# Patient Record
Sex: FEMALE | Race: WHITE | NOT HISPANIC OR LATINO | Employment: OTHER | URBAN - METROPOLITAN AREA
[De-identification: names, ages, dates, MRNs, and addresses within clinical notes are randomized per-mention and may not be internally consistent; named-entity substitution may affect disease eponyms.]

---

## 2017-10-17 ENCOUNTER — TRANSFERRED RECORDS (OUTPATIENT)
Dept: HEALTH INFORMATION MANAGEMENT | Facility: CLINIC | Age: 37
End: 2017-10-17

## 2017-11-06 ENCOUNTER — TRANSFERRED RECORDS (OUTPATIENT)
Dept: HEALTH INFORMATION MANAGEMENT | Facility: CLINIC | Age: 37
End: 2017-11-06

## 2018-07-16 ENCOUNTER — OFFICE VISIT (OUTPATIENT)
Dept: DERMATOLOGY | Facility: CLINIC | Age: 38
End: 2018-07-16
Attending: DERMATOLOGY
Payer: COMMERCIAL

## 2018-07-16 VITALS
BODY MASS INDEX: 39.2 KG/M2 | HEART RATE: 74 BPM | HEIGHT: 62 IN | SYSTOLIC BLOOD PRESSURE: 147 MMHG | DIASTOLIC BLOOD PRESSURE: 83 MMHG | WEIGHT: 213 LBS

## 2018-07-16 DIAGNOSIS — L29.9 PRURITUS: ICD-10-CM

## 2018-07-16 DIAGNOSIS — L24.9 IRRITANT CONTACT DERMATITIS, UNSPECIFIED TRIGGER: Primary | ICD-10-CM

## 2018-07-16 PROCEDURE — G0463 HOSPITAL OUTPT CLINIC VISIT: HCPCS | Mod: ZF

## 2018-07-16 RX ORDER — SACCHAROMYCES BOULARDII 250 MG
250 CAPSULE ORAL 2 TIMES DAILY
COMMUNITY
End: 2019-04-23

## 2018-07-16 RX ORDER — FLUOCINONIDE 0.5 MG/G
OINTMENT TOPICAL
Qty: 240 G | Refills: 1 | Status: SHIPPED | OUTPATIENT
Start: 2018-07-16 | End: 2019-11-25

## 2018-07-16 ASSESSMENT — PAIN SCALES - GENERAL: PAINLEVEL: NO PAIN (0)

## 2018-07-16 NOTE — LETTER
7/16/2018      RE: Pao Pereira  [address]  [address]  [facility] [address]       Dermatology Clinic Note    Dermatology Problem List:  1. Hand dermatitis  2. 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 Past history of lip dermatitis  4. Past history of sensitivity to metal jewelry       CC: Patient presents with:  Establish Care: Skin check , C/O eczema      HPI: Pao Pereira is a 37 year old female presenting for initial evaluation of rash on the hands, elbows, knees. Rash on the hands started in 12/17. She developed irritation on the ring finger and it spread to cover both hands. Rash was treated by another dermatologist with topical clobetasol and oral prednisone. The rash continues to recur. Ms. Pereira has been intermittently been using triple antibiotic, Aveeno eczema lotion and Eucerin lotion. Washes dishes without gloves. Uses Softsoap for hand washing. Using baby wipes on her children. Children play with slime. She uses bleach wipes to clean her home. No use of hand .    Ms. Pereira notes past history of lip irritation from using lip products like Chapstick, and a history of rash when wearing certain jewelry and earrings. No history of atopic dermatitis as a child.     Rash on the elbows and knees started 3 months ago and is mildly pruritic, but not to the degree of the hand rash.        Patient Active Problem List   Diagnosis     Irritant contact dermatitis, unspecified trigger       Allergies not on file      Current Outpatient Prescriptions   Medication     fluocinonide (LIDEX) 0.05 % ointment     saccharomyces boulardii (FLORASTOR) 250 MG capsule     No current facility-administered medications for this visit.        No family history on file.    Social History     Social History     Marital status:      Spouse name: N/A     Number of children: N/A     Years of education: N/A     Occupational History     Not on file.     Social History Main Topics     Smoking status: Never  "Smoker     Smokeless tobacco: Never Used     Alcohol use 0.6 oz/week     1 Glasses of wine per week     Drug use: No     Sexual activity: Yes     Partners: Male     Birth control/ protection: IUD     Other Topics Concern     Not on file     Social History Narrative     No narrative on file         ROS: Feeling well without other skin concerns.     EXAM:  /83  Pulse 74  Ht 5' 2\" (157.5 cm)  Wt 213 lb (96.6 kg)  LMP 07/14/2018  Breastfeeding? No  BMI 38.96 kg/m2  GEN: Alert, no distress  HEENT: Conjunctiva clear.   PULM: Breathing comfortably on RA  CV: Extrem warm and well perfused  ABD: No distension  SKIN: Exam of the scalp, hands, arms, legs:  --Pink ill defined scaling plaques with linear fissuring on the palms and distal fingers with yellow crusting in the fissures. There is edema to the distal fingertips and periungual fingers with horizontal ridging of the most of the fingernail plates. Erythema and scale extends into the web spaces of most fingers of the bilateral hands and there are erythematous ill defined plaques on the bilateral volar wrists  --Circular well demarcated pink plaques on the bilateral elbows and knees        Assessment and Plan:    1. Hand dermatitis: The exam and history is most suspicious for contact dermatitis- allergic vs irritant. Exposures include over the counter antibiotic ointment, dish soap, soft soap, slime, baby wipes and bleach wipes. There are fissures and signs of impetigo today. I recommended the following plan   -Dilute bleach soaks to hands daily  -Lidex ointment BID  -Vaseline BID  -Avoid contactants  -Referral to patch clinic at [facility].       2. Psoriasiform eruption on the elbows and knees: Well demarcated plaques in this location would be most suggestive of psoriasis. The hand eruption may represent a manifestation of palmar psoriasis, but the severe pruritus as well as the classic dermatitic appearance of lesions of the wrist would indicate " dermatitis. I suggested bid lidex to these areas. Will biopsy at next visit if persistent.     RTC 1 month.       Brittney Valentin MD  Dermatology Staff                  Brittney Valentin MD

## 2018-07-16 NOTE — LETTER
Date:July 20, 2018      Patient was self referred, no letter generated. Do not send.        [facility] Information

## 2018-07-16 NOTE — PROGRESS NOTES
Dermatology Clinic Note    Dermatology Problem List:  1. Hand dermatitis  2. 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 Past history of lip dermatitis  4. Past history of sensitivity to metal jewelry       CC: Patient presents with:  Establish Care: Skin check , C/O eczema      HPI: Pao Pereira is a 37 year old female presenting for initial evaluation of rash on the hands, elbows, knees. Rash on the hands started in 12/17. She developed irritation on the ring finger and it spread to cover both hands. Rash was treated by another dermatologist with topical clobetasol and oral prednisone. The rash continues to recur. Ms. Pereira has been intermittently been using triple antibiotic, Aveeno eczema lotion and Eucerin lotion. Washes dishes without gloves. Uses Softsoap for hand washing. Using baby wipes on her children. Children play with slime. She uses bleach wipes to clean her home. No use of hand .    Ms. Pereira notes past history of lip irritation from using lip products like Chapstick, and a history of rash when wearing certain jewelry and earrings. No history of atopic dermatitis as a child.     Rash on the elbows and knees started 3 months ago and is mildly pruritic, but not to the degree of the hand rash.        Patient Active Problem List   Diagnosis     Irritant contact dermatitis, unspecified trigger       Allergies not on file      Current Outpatient Prescriptions   Medication     fluocinonide (LIDEX) 0.05 % ointment     saccharomyces boulardii (FLORASTOR) 250 MG capsule     No current facility-administered medications for this visit.        No family history on file.    Social History     Social History     Marital status:      Spouse name: N/A     Number of children: N/A     Years of education: N/A     Occupational History     Not on file.     Social History Main Topics     Smoking status: Never Smoker     Smokeless tobacco: Never Used     Alcohol use 0.6 oz/week     1 Glasses of wine  "per week     Drug use: No     Sexual activity: Yes     Partners: Male     Birth control/ protection: IUD     Other Topics Concern     Not on file     Social History Narrative     No narrative on file         ROS: Feeling well without other skin concerns.     EXAM:  /83  Pulse 74  Ht 5' 2\" (157.5 cm)  Wt 213 lb (96.6 kg)  LMP 07/14/2018  Breastfeeding? No  BMI 38.96 kg/m2  GEN: Alert, no distress  HEENT: Conjunctiva clear.   PULM: Breathing comfortably on RA  CV: Extrem warm and well perfused  ABD: No distension  SKIN: Exam of the scalp, hands, arms, legs:  --Pink ill defined scaling plaques with linear fissuring on the palms and distal fingers with yellow crusting in the fissures. There is edema to the distal fingertips and periungual fingers with horizontal ridging of the most of the fingernail plates. Erythema and scale extends into the web spaces of most fingers of the bilateral hands and there are erythematous ill defined plaques on the bilateral volar wrists  --Circular well demarcated pink plaques on the bilateral elbows and knees        Assessment and Plan:    1. Hand dermatitis: The exam and history is most suspicious for contact dermatitis- allergic vs irritant. Exposures include over the counter antibiotic ointment, dish soap, soft soap, slime, baby wipes and bleach wipes. There are fissures and signs of impetigo today. I recommended the following plan   -Dilute bleach soaks to hands daily  -Lidex ointment BID  -Vaseline BID  -Avoid contactants  -Referral to patch clinic at [facility].       2. Psoriasiform eruption on the elbows and knees: Well demarcated plaques in this location would be most suggestive of psoriasis. The hand eruption may represent a manifestation of palmar psoriasis, but the severe pruritus as well as the classic dermatitic appearance of lesions of the wrist would indicate dermatitis. I suggested bid lidex to these areas. Will biopsy at next visit if persistent.     RTC 1 " month.       Brittney Valentin MD  Dermatology Staff

## 2018-07-16 NOTE — PATIENT INSTRUCTIONS
"-Soaks hands daily with 1/2 gallon warm water with 1 tablespoon plain bleach. Soak 5 min. After soaking apply lidex ointment, Vaseline. OK to wear plain cotton gloves to bed  -Repeat lidex a 2nd time daily  -Avoid contact with dish detergent  -Look for wet wipes that do not contain fragrance or MCI/MI= methylisothiazolinone or methylchloroisothiazolinone. Look for \"water wipes\"  -I will place referral to [facility] patch clinic in [place]. If this location is not covered we can do the testing at [facility]  -Avoid triple antibiotic ointment    Hand Dermatitis:  The hands are exposed to more irritants than other body parts, which makes them a common place for dry skin and rashes.  Frequent wetting of the hands and washing can make this worse.      Try these strategies:  1. Make sure that all of your products are hypoallergenic/fragrance free (see the gentle skin care instructions)  2. Moisturize the hands frequently, especially after handwashing.  Consider sending moisturizer with your child to school.  3. Choose very thick products for overnight. Vaseline and other similar greasy ointments are best.  4. Consider covering the hands with white cotton gloves for a few hours in the evening or even overnight while sleeping  5. If your doctor has given a prescription medication for the hand rash, apply this first, then apply a thick coating of moisturizer  6. Minimize handwashing when possible (but always hand wash after using the restroom and before meals)  7. Remove harsh soaps from bathrooms, sanket, and other places your child watches his/her hands.    -Most  pump  hand soaps (including the brand Softsoap but not limited too) contain detergents that strip the natural oils from the skin. An example of this is; dish detergent which makes a lot of suds which is used to strip the grease from dishes. These detergents do the same thing to the oils on the hands.    -Replace harsh or high-sudsing soaps with a gentle liquid " cleanser or mild bar soap.   -Organic or homemade soaps may also worsen hand dermatitis if they contain plant materials or fragrance.   8. Avoid using pre-moistened or baby wipes on the hands. These contain preservatives and ingredients that can cause skin irritation or allergy.  9. Ask if your child is using bleach or cleaning wipes at school to clean his/her desk.  -These are very harsh on the skin and can worsen dermatitis.   -Residue left behind from the wipes may stay on surfaces that your child touches and continue to irritate the skin.   10. Considering sending a gentle cleanser to school to use for handwashing (most schools will require a doctor's note, we would be happy to provide this)

## 2018-07-16 NOTE — NURSING NOTE
Chief Complaint   Patient presents with     Establish Care     Skin check , C/O eczema   Ivana Elliott LPN

## 2018-07-16 NOTE — MR AVS SNAPSHOT
"              After Visit Summary   7/16/2018    Pao Pereira    MRN: [id number]           Patient Information     Date Of Birth          1980        Visit Information        Provider Department      7/16/2018 9:15 AM Brittney Valentin MD [facility]        Today's Diagnoses     Irritant contact dermatitis, unspecified trigger    -  1      Care Instructions    -Soaks hands daily with 1/2 gallon warm water with 1 tablespoon plain bleach. Soak 5 min. After soaking apply lidex ointment, Vaseline. OK to wear plain cotton gloves to bed  -Repeat lidex a 2nd time daily  -Avoid contact with dish detergent  -Look for wet wipes that do not contain fragrance or MCI/MI= methylisothiazolinone or methylchloroisothiazolinone. Look for \"water wipes\"  -I will place referral to [facility] patch clinic in [place]. If this location is not covered we can do the testing at [facility]  -Avoid triple antibiotic ointment    Hand Dermatitis:  The hands are exposed to more irritants than other body parts, which makes them a common place for dry skin and rashes.  Frequent wetting of the hands and washing can make this worse.      Try these strategies:  1. Make sure that all of your products are hypoallergenic/fragrance free (see the gentle skin care instructions)  2. Moisturize the hands frequently, especially after handwashing.  Consider sending moisturizer with your child to school.  3. Choose very thick products for overnight. Vaseline and other similar greasy ointments are best.  4. Consider covering the hands with white cotton gloves for a few hours in the evening or even overnight while sleeping  5. If your doctor has given a prescription medication for the hand rash, apply this first, then apply a thick coating of moisturizer  6. Minimize handwashing when possible (but always hand wash after using the restroom and before meals)  7. Remove harsh soaps from bathrooms, sanket, and other places your child " watches his/her hands.    -Most  pump  hand soaps (including the brand Softsoap but not limited too) contain detergents that strip the natural oils from the skin. An example of this is; dish detergent which makes a lot of suds which is used to strip the grease from dishes. These detergents do the same thing to the oils on the hands.    -Replace harsh or high-sudsing soaps with a gentle liquid cleanser or mild bar soap.   -Organic or homemade soaps may also worsen hand dermatitis if they contain plant materials or fragrance.   8. Avoid using pre-moistened or baby wipes on the hands. These contain preservatives and ingredients that can cause skin irritation or allergy.  9. Ask if your child is using bleach or cleaning wipes at school to clean his/her desk.  -These are very harsh on the skin and can worsen dermatitis.   -Residue left behind from the wipes may stay on surfaces that your child touches and continue to irritate the skin.   10. Considering sending a gentle cleanser to school to use for handwashing (most schools will require a doctor's note, we would be happy to provide this)            Follow-ups after your visit        Who to contact     Please call your clinic at [phone] to:    Ask questions about your health    Make or cancel appointments    Discuss your medicines    Learn about your test results    Speak to your doctor            Additional Information About Your Visit        Foound Information     Foound gives you secure access to your electronic health record. If you see a primary care provider, you can also send messages to your care team and make appointments. If you have questions, please call your primary care clinic.  If you do not have a primary care provider, please call [phone] and they will assist you.      Fo[employer] is an electronic gateway that provides easy, online access to your medical records. With [employer], you can request a clinic appointment, read your test results, renew a  "prescription or communicate with your care team.     To access your existing account, please contact your [facility] or call [phone] for assistance.        Care EveryWhere ID     This is your Care EveryWhere ID. This could be used by other organizations to access your [place] medical records  [id number]        Your Vitals Were     Pulse Height Last Period Breastfeeding? BMI (Body Mass Index)       74 5' 2\" (157.5 cm) 07/14/2018 No 38.96 kg/m2        Blood Pressure from Last 3 Encounters:   07/16/18 147/83    Weight from Last 3 Encounters:   07/16/18 213 lb (96.6 kg)              Today, you had the following     No orders found for display         Today's Medication Changes          These changes are accurate as of 7/16/18  9:49 AM.  If you have any questions, ask your nurse or doctor.               Start taking these medicines.        Dose/Directions    fluocinonide 0.05 % ointment   Commonly known as:  LIDEX   Used for:  Irritant contact dermatitis, unspecified trigger   Started by:  Brittney Valentin MD        Twice daily to rash on the hands, elbows, knees for 4 weeks   Quantity:  240 g   Refills:  1            Where to get your medicines      Some of these will need a paper prescription and others can be bought over the counter.  Ask your nurse if you have questions.     Bring a paper prescription for each of these medications     fluocinonide 0.05 % ointment                Primary Care Provider    None Specified       No primary provider on file.        Equal Access to Services     RIANNA DON AH: Adeola Mccracken, wacathie smith, qaybta caitiealannika rocha. So [facility] [phone].    ATENCIÓN: Si habla español, tiene a parikh disposición servicios gratuitos de asistencia lingüística. Llame al [phone].    We comply with applicable federal civil rights laws and [place] laws. We do not discriminate on the basis of " race, color, national origin, age, disability, sex, sexual orientation, or gender identity.            Thank you!     Thank you for choosing [facility]  for your care. Our goal is always to provide you with excellent care. Hearing back from our patients is one way we can continue to improve our services. Please take a few minutes to complete the written survey that you may receive in the mail after your visit with us. Thank you!             Your Updated Medication List - Protect others around you: Learn how to safely use, store and throw away your medicines at www.disposemymeds.org.          This list is accurate as of 7/16/18  9:49 AM.  Always use your most recent med list.                   Brand Name Dispense Instructions for use Diagnosis    fluocinonide 0.05 % ointment    LIDEX    240 g    Twice daily to rash on the hands, elbows, knees for 4 weeks    Irritant contact dermatitis, unspecified trigger       saccharomyces boulardii 250 MG capsule    FLORASTOR     Take 250 mg by mouth 2 times daily

## 2018-07-19 PROBLEM — L29.9 PRURITUS: Status: ACTIVE | Noted: 2018-07-19

## 2018-07-25 ENCOUNTER — HEALTH MAINTENANCE LETTER (OUTPATIENT)
Age: 38
End: 2018-07-25

## 2018-08-31 ENCOUNTER — OFFICE VISIT (OUTPATIENT)
Dept: DERMATOLOGY | Facility: CLINIC | Age: 38
End: 2018-08-31
Payer: COMMERCIAL

## 2018-08-31 DIAGNOSIS — L24.9 IRRITANT CONTACT DERMATITIS, UNSPECIFIED TRIGGER: Primary | ICD-10-CM

## 2018-08-31 DIAGNOSIS — L29.9 PRURITUS: ICD-10-CM

## 2018-08-31 ASSESSMENT — PAIN SCALES - GENERAL: PAINLEVEL: NO PAIN (0)

## 2018-08-31 NOTE — LETTER
8/31/2018       RE: Pao Pereira  [address]  [address]  [facility] [address]     Dear Colleague,    Thank you for referring your patient, Pao Pereira, to the [facility] DERMATOLOGY at [facility]. Please see a copy of my visit note below.    Dermatology Clinic Note     Dermatology Problem List:  1. Hand dermatitis  2. 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 Past history of lip dermatitis  4. Past history of sensitivity to metal jewelry     Dermatology Clinic Visit Note      CHIEF COMPLAINT:  Followup hand dermatitis.      HISTORY OF PRESENT ILLNESS:  Ms. Pereira is a 37-year-old female returning to Dermatology Clinic for evaluation of dermatitis on the hands and elbows, last seen in clinic on 07/16/2018.  Please see note from that time for list of contactants.  The patient was advised to avoid all exposures including over-the-counter antibiotic ointment, dish soap, soft soaps, slime, baby wipes and bleach wipes.  I recommended dilute bleach soaks to the hands daily, Lidex ointment twice daily, Vaseline twice daily and referred to [facility].  Patient states that she has been using medications as prescribed.  She notes resolution of hand dermatitis but continues to develop new lesions intermittently.  Eruption on the elbows has been much improved.  She has had no past biopsy of her skin.      Patient Active Problem List   Diagnosis     Irritant contact dermatitis, unspecified trigger     Pruritus       Not on File      Current Outpatient Prescriptions   Medication     fluocinonide (LIDEX) 0.05 % ointment     saccharomyces boulardii (FLORASTOR) 250 MG capsule     No current facility-administered medications for this visit.           SOCIAL HISTORY:  The patient is .  She is a nonsmoker.      REVIEW OF SYSTEMS:  Feels well without other skin concerns.      PHYSICAL EXAMINATION:   GENERAL:  The patient is a healthy-appearing 37-year-old female in no distress.    HEENT:  Conjunctivae are clear.   PULMONARY:  Breathing comfortably on room air.   SKIN:  Exam was focused on the hands and arms.  Skin exam was normal except for as follows:     -- Near resolution of plaques on the palms.  There is mild linear fissuring of the distal fingers.  Elbows show slightly thickened pink plaques that are ill defined.      ASSESSMENT AND PLAN:   1.  History of hand dermatitis.  Differential diagnosis would include psoriasis of the hands.  She is much improved with use of gentle skin cares, dilute bleach soaks and Lidex ointment twice daily as needed.  She notes when she discontinues this regimen lesions begin to recur.  I will contact [facility] for scheduling for testing.  The patient states that she has been unable to schedule.      The patient to return to Dermatology Clinic after she completes her allergy testing.     Brittney Valentin MD  Dermatology Staff          Again, thank you for allowing me to participate in the care of your patient.      Sincerely,    Brittney Valentin MD

## 2018-08-31 NOTE — MR AVS SNAPSHOT
After Visit Summary   8/31/2018    Pao Pereira    MRN: [id number]           Patient Information     Date Of Birth          1980        Visit Information        Provider Department      8/31/2018 3:30 PM Brittney Valentin MD [facility] Dermatology        Today's Diagnoses     Irritant contact dermatitis, unspecified trigger    -  1    Pruritus           Follow-ups after your visit        Who to contact     Please call your clinic at [phone] to:    Ask questions about your health    Make or cancel appointments    Discuss your medicines    Learn about your test results    Speak to your doctor            Additional Information About Your Visit        MyChart Information     [employer] gives you secure access to your electronic health record. If you see a primary care provider, you can also send messages to your care team and make appointments. If you have questions, please call your primary care clinic.  If you do not have a primary care provider, please call [phone] and they will assist you.      [employer] is an electronic gateway that provides easy, online access to your medical records. With [employer], you can request a clinic appointment, read your test results, renew a prescription or communicate with your care team.     To access your existing account, please contact your [facility] or call [phone] for assistance.        Care EveryWhere ID     This is your Care EveryWhere ID. This could be used by other organizations to access your [place] medical records  [id number]         Blood Pressure from Last 3 Encounters:   07/16/18 147/83    Weight from Last 3 Encounters:   07/16/18 96.6 kg (213 lb)              Today, you had the following     No orders found for display       Primary Care Provider    None Specified       No primary provider on file.        Equal Access to Services     RIANNA DON : naomi Damian qaybta  annika hodgejarad seals ah. So [facility] [phone].    ATENCIÓN: Si damaso madden, tiene a parikh disposición servicios gratuitos de asistencia lingüística. Chuck al [phone].    We comply with applicable federal civil rights laws and [place] laws. We do not discriminate on the basis of race, color, national origin, age, disability, sex, sexual orientation, or gender identity.            Thank you!     Thank you for choosing [facility] DERMATOLOGY  for your care. Our goal is always to provide you with excellent care. Hearing back from our patients is one way we can continue to improve our services. Please take a few minutes to complete the written survey that you may receive in the mail after your visit with us. Thank you!             Your Updated Medication List - Protect others around you: Learn how to safely use, store and throw away your medicines at www.disposemymeds.org.          This list is accurate as of 8/31/18 11:59 PM.  Always use your most recent med list.                   Brand Name Dispense Instructions for use Diagnosis    fluocinonide 0.05 % ointment    LIDEX    240 g    Twice daily to rash on the hands, elbows, knees for 4 weeks    Irritant contact dermatitis, unspecified trigger       saccharomyces boulardii 250 MG capsule    FLORASTOR     Take 250 mg by mouth 2 times daily

## 2018-08-31 NOTE — PROGRESS NOTES
Dermatology Clinic Note     Dermatology Problem List:  1. Hand dermatitis  2. 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 Past history of lip dermatitis  4. Past history of sensitivity to metal jewelry     Dermatology Clinic Visit Note      CHIEF COMPLAINT:  Followup hand dermatitis.      HISTORY OF PRESENT ILLNESS:  Ms. Pereira is a 37-year-old female returning to Dermatology Clinic for evaluation of dermatitis on the hands and elbows, last seen in clinic on 07/16/2018.  Please see note from that time for list of contactants.  The patient was advised to avoid all exposures including over-the-counter antibiotic ointment, dish soap, soft soaps, slime, baby wipes and bleach wipes.  I recommended dilute bleach soaks to the hands daily, Lidex ointment twice daily, Vaseline twice daily and referred to [facility].  Patient states that she has been using medications as prescribed.  She notes resolution of hand dermatitis but continues to develop new lesions intermittently.  Eruption on the elbows has been much improved.  She has had no past biopsy of her skin.      Patient Active Problem List   Diagnosis     Irritant contact dermatitis, unspecified trigger     Pruritus       Not on File      Current Outpatient Prescriptions   Medication     fluocinonide (LIDEX) 0.05 % ointment     saccharomyces boulardii (FLORASTOR) 250 MG capsule     No current facility-administered medications for this visit.           SOCIAL HISTORY:  The patient is .  She is a nonsmoker.      REVIEW OF SYSTEMS:  Feels well without other skin concerns.      PHYSICAL EXAMINATION:   GENERAL:  The patient is a healthy-appearing 37-year-old female in no distress.   HEENT:  Conjunctivae are clear.   PULMONARY:  Breathing comfortably on room air.   SKIN:  Exam was focused on the hands and arms.  Skin exam was normal except for as follows:     -- Near resolution of plaques on the palms.  There is mild linear fissuring of the distal fingers.  Elbows  show slightly thickened pink plaques that are ill defined.      ASSESSMENT AND PLAN:   1.  History of hand dermatitis.  Differential diagnosis would include psoriasis of the hands.  She is much improved with use of gentle skin cares, dilute bleach soaks and Lidex ointment twice daily as needed.  She notes when she discontinues this regimen lesions begin to recur.  I will contact [facility] for scheduling for testing.  The patient states that she has been unable to schedule.      The patient to return to Dermatology Clinic after she completes her allergy testing.     Brittney Valentin MD  Dermatology Staff

## 2018-08-31 NOTE — NURSING NOTE
Chief Complaint   Patient presents with     RECHECK     Patient is seen today for a follow up of Eczema on hands, elbows, and knees patient states it has improved since last time.      Tina Olmos CMA

## 2018-08-31 NOTE — LETTER
Date:September 11, 2018      Patient was self referred, no letter generated. Do not send.        [facility] Information

## 2018-10-17 ENCOUNTER — ALLIED HEALTH/NURSE VISIT (OUTPATIENT)
Dept: NURSING | Facility: CLINIC | Age: 38
End: 2018-10-17
Payer: COMMERCIAL

## 2018-10-17 DIAGNOSIS — Z23 NEED FOR PROPHYLACTIC VACCINATION AND INOCULATION AGAINST INFLUENZA: Primary | ICD-10-CM

## 2018-10-17 PROCEDURE — 90686 IIV4 VACC NO PRSV 0.5 ML IM: CPT

## 2018-10-17 PROCEDURE — 99207 ZZC NO CHARGE NURSE ONLY: CPT

## 2018-10-17 PROCEDURE — 90471 IMMUNIZATION ADMIN: CPT

## 2018-10-17 NOTE — MR AVS SNAPSHOT
After Visit Summary   10/17/2018    Pao Pereira    MRN: [id number]           Patient Information     Date Of Birth          1980        Visit Information        Provider Department      10/17/2018 5:10 PM CARE COORDINATOR [facility]        Today's Diagnoses     Need for prophylactic vaccination and inoculation against influenza    -  1       Follow-ups after your visit        Who to contact     If you have questions or need follow up information about today's clinic visit or your schedule please contact [facility] directly at [phone].  Normal or non-critical lab and imaging results will be communicated to you by Onith[facility], letter or phone within 4 business days after the clinic has received the results. If you do not hear from us within 7 days, please contact the clinic through [facility] or phone. If you have a critical or abnormal lab result, we will notify you by phone as soon as possible.  Submit refill requests through [facility] or call your pharmacy and they will forward the refill request to us. Please allow 3 business days for your refill to be completed.          Additional Information About Your Visit        MyChart Information     [facility] gives you secure access to your electronic health record. If you see a primary care provider, you can also send messages to your care team and make appointments. If you have questions, please call your primary care clinic.  If you do not have a primary care provider, please call [phone] and they will assist you.        Care EveryWhere ID     This is your Care EveryWhere ID. This could be used by other organizations to access your [place] medical records  [id number]         Blood Pressure from Last 3 Encounters:   07/16/18 147/83    Weight from Last 3 Encounters:   07/16/18 213 lb (96.6 kg)              We Performed the Following     FLU VACCINE, SPLIT VIRUS, IM (QUADRIVALENT) [52546]-  >3 YRS     Vaccine Administration, Initial [75009]        Primary Care Provider    None Specified       No primary provider on file.        Equal Access to Services     RIANNA DON : Hadii aad ku hadtinyiesha Mccracken, thompsonda kaitlindavionha, nabila caitiesanjuwes harorimawes, annika simeon adrienneumer munguiahalimajose carlos frankel. So [facility] [phone].    ATENCIÓN: Si habla español, tiene a parikh disposición servicios gratuitos de asistencia lingüística. Llame al [phone].    We comply with applicable federal civil rights laws and [place] laws. We do not discriminate on the basis of race, color, national origin, age, disability, sex, sexual orientation, or gender identity.            Thank you!     Thank you for choosing [facility]  for your care. Our goal is always to provide you with excellent care. Hearing back from our patients is one way we can continue to improve our services. Please take a few minutes to complete the written survey that you may receive in the mail after your visit with us. Thank you!             Your Updated Medication List - Protect others around you: Learn how to safely use, store and throw away your medicines at www.disposemymeds.org.          This list is accurate as of 10/17/18  5:51 PM.  Always use your most recent med list.                   Brand Name Dispense Instructions for use Diagnosis    fluocinonide 0.05 % ointment    LIDEX    240 g    Twice daily to rash on the hands, elbows, knees for 4 weeks    Irritant contact dermatitis, unspecified trigger       saccharomyces boulardii 250 MG capsule    FLORASTOR     Take 250 mg by mouth 2 times daily

## 2018-10-17 NOTE — PROGRESS NOTES

## 2019-04-22 ASSESSMENT — ENCOUNTER SYMPTOMS
NAUSEA: 1
POOR WOUND HEALING: 1
DIARRHEA: 1

## 2019-04-22 ASSESSMENT — ANXIETY QUESTIONNAIRES
3. WORRYING TOO MUCH ABOUT DIFFERENT THINGS: NOT AT ALL
7. FEELING AFRAID AS IF SOMETHING AWFUL MIGHT HAPPEN: NOT AT ALL
7. FEELING AFRAID AS IF SOMETHING AWFUL MIGHT HAPPEN: NOT AT ALL
1. FEELING NERVOUS, ANXIOUS, OR ON EDGE: NOT AT ALL
5. BEING SO RESTLESS THAT IT IS HARD TO SIT STILL: NOT AT ALL
GAD7 TOTAL SCORE: 0
2. NOT BEING ABLE TO STOP OR CONTROL WORRYING: NOT AT ALL
4. TROUBLE RELAXING: NOT AT ALL
6. BECOMING EASILY ANNOYED OR IRRITABLE: NOT AT ALL
GAD7 TOTAL SCORE: 0

## 2019-04-23 ENCOUNTER — OFFICE VISIT (OUTPATIENT)
Dept: OBGYN | Facility: CLINIC | Age: 39
End: 2019-04-23
Attending: OBSTETRICS & GYNECOLOGY
Payer: COMMERCIAL

## 2019-04-23 VITALS
HEART RATE: 66 BPM | WEIGHT: 203.4 LBS | SYSTOLIC BLOOD PRESSURE: 124 MMHG | BODY MASS INDEX: 37.43 KG/M2 | HEIGHT: 62 IN | DIASTOLIC BLOOD PRESSURE: 81 MMHG

## 2019-04-23 DIAGNOSIS — Z30.432 ENCOUNTER FOR IUD REMOVAL: ICD-10-CM

## 2019-04-23 DIAGNOSIS — Z13.1 SCREENING FOR DIABETES MELLITUS: ICD-10-CM

## 2019-04-23 DIAGNOSIS — Z13.29 SCREENING FOR THYROID DISORDER: ICD-10-CM

## 2019-04-23 DIAGNOSIS — Z13.220 SCREENING FOR LIPOID DISORDERS: ICD-10-CM

## 2019-04-23 DIAGNOSIS — Z01.419 ENCOUNTER FOR GYNECOLOGICAL EXAMINATION WITHOUT ABNORMAL FINDING: ICD-10-CM

## 2019-04-23 DIAGNOSIS — Z00.00 VISIT FOR PREVENTIVE HEALTH EXAMINATION: Primary | ICD-10-CM

## 2019-04-23 DIAGNOSIS — Z12.4 SCREENING FOR MALIGNANT NEOPLASM OF CERVIX: ICD-10-CM

## 2019-04-23 LAB
CHOLEST SERPL-MCNC: 165 MG/DL
HBA1C MFR BLD: 4.9 % (ref 0–5.6)
HDLC SERPL-MCNC: 60 MG/DL
LDLC SERPL CALC-MCNC: 89 MG/DL
NONHDLC SERPL-MCNC: 105 MG/DL
TRIGL SERPL-MCNC: 80 MG/DL
TSH SERPL DL<=0.005 MIU/L-ACNC: 3.04 MU/L (ref 0.4–4)

## 2019-04-23 PROCEDURE — G0463 HOSPITAL OUTPT CLINIC VISIT: HCPCS | Mod: ZF

## 2019-04-23 PROCEDURE — 84443 ASSAY THYROID STIM HORMONE: CPT

## 2019-04-23 PROCEDURE — 83036 HEMOGLOBIN GLYCOSYLATED A1C: CPT

## 2019-04-23 PROCEDURE — 87624 HPV HI-RISK TYP POOLED RSLT: CPT

## 2019-04-23 PROCEDURE — G0145 SCR C/V CYTO,THINLAYER,RESCR: HCPCS

## 2019-04-23 PROCEDURE — 80061 LIPID PANEL: CPT

## 2019-04-23 PROCEDURE — 36415 COLL VENOUS BLD VENIPUNCTURE: CPT

## 2019-04-23 ASSESSMENT — ANXIETY QUESTIONNAIRES: GAD7 TOTAL SCORE: 0

## 2019-04-23 ASSESSMENT — MIFFLIN-ST. JEOR: SCORE: 1555.87

## 2019-04-23 NOTE — LETTER
Date:April 29, 2019      Patient was self referred, no letter generated. Do not send.        [facility] Health Information

## 2019-04-23 NOTE — PROGRESS NOTES
"[facility]  Annual Exam    HPI:    Pao Pereira is a 38 year old  (, ), here for well woman exam and discussion of endometriosis. She has lifelong history of abdominal pain, and heavy periods with clotting. In 10/2017 she noted worsening pain and had a pelvic US with endometriomas/ finidings consistent with frozen cul de sac. A month later had the Mirena put in. Her pain is unchanged with this. She is still having monthly menses with Mirena, Q25 days. The bleeding is lighter but lasting for 9 days. No abnormal vaginal discharge, no urinary symptoms. No bowel concerns but mentions ongoing diarrhea, this is not new for her. She is desiring another pregnancy, and would like her IUD removed today.    GYN History  - LMP: Patient's last menstrual period was 2019.  - Menses: Montly Q28 days, light bleeding for 9 days  - Pap Smears: Possible history of abnormal per patient, records unavailable  - Contraception: Mirena IUD put in 2017  - Sexual Activity: Male partners, occasional pain with intercourse  - Sexual Concerns: Occasional pain with intercourse  - Hx STIs: No    OBHx  OB History    Para Term  AB Living   2 2 2 0 0 2   SAB TAB Ectopic Multiple Live Births   0 0 0 0 2      # Outcome Date GA Lbr Amilcar/2nd Weight Sex Delivery Anes PTL Lv   2 Term            1 Term              Past Medical History  - None  - Migraines without aura    Past Surgical History  - None    Medications  - Mirena    Allergies  - None    Social History  - Moved here from [place], for 's job.     Family History  - Maternal Aunt- Breast Cancer    ROS: 10-Point ROS negative except as noted in HPI    Preventative Health  Current or historical sexual, physical or mental abuse: No  Feelings of depression: None  Diet: Trying to eat healthier  Exercise: 3 x a week at [facility]    Physical Exam  /81 (BP Location: Left arm, Patient Position: Chair)   Pulse 66   Ht 1.575 m (5' 2\")   Wt 92.3 kg (203 lb " 6.4 oz)   LMP 2019   Breastfeeding? No   BMI 37.20 kg/m    Gen: Well-appearing, NAD  HEENT: Normocephalic, atraumatic  Neck: Thyroid is not enlarged, no appreciable masses palpated. Non-tender  CV:  RRR, no m/r/g auscultated  Pulm: CTAB, no w/r/r auscultated  Abd: Soft, non-tender, non-distended  Ext: No LE edema, extremities warm and well perfused    Breast: Symmetric, no nipple discharge or retraction, no axillary lymphadenopathy, no palpable nodules or masses bilaterally    Pelvic:  Normal appearing external female genitalia. Normal hair distribution. Vagina is without lesions.  discharge. Cervix without lesions, no cervical motion tenderness. Uterus difficult to palpate with patients body habitus. No adnexal tenderness or masses palpable.    Current BMI: Body mass index is 37.2 kg/m . (Obese)    IUD Removal Procedure:  Patient placed in lithotomy position. A medium Graves speculum was placed in the vagina. IUD strings visualized easily. Strings grasped with a ringed forceps. IUD was removed with gentle traction. IUD was intact upon removal. Patient tolerated the procedure well.    Assessment/Plan  Pao Pereira is a 38 year old  female here for annual exam, IUD removal, pre-conception counseling, and follow-up of endometriosis.    #Endometriosis  - Lifelong history of abdominal pain and heavy periods  - Worsening in 2017 prompting US with findings consistent with Endometriosis (bilateral endometriomas)  - Mirena was in place (removed today), however was not significantly helping with pain.  - Patient is planning on conceiving, discussed she will likely have improvement in her endometriosis in pregnancy  - Can consider Mirena placement in post-partum period vs OCPs  -offered follow up ultrasound as her previous was in 2017, she declined today    #Preconception Counseling  - Recommended starting prenatal vitamin or daily folic acid  - Mirena IUD removed  - Previous  x 2  - No history of  infertility, pregnancies uncomplicated, with the exception of hypothyroidism in pregnancy. Patient states she took Synthroid during pregnancy and discontinued postpartum. Will get TSH with reflex T4 today.    #Routine Health Maintenance  - Last Pap- Unable to obtain records, performed today  - Mammogram- Not indicated  - Colonoscopy- Not indicated  - Lab Work- TSH, HgbA1, Lipid Panel ordered today.    Follow Up: In one year for annual exam or sooner if needed.    Patient discussed and staffed with Dr. Jean Paul Stewart MD  OB/GYN PGY-1  04/23/19 3:27 PM     The patient was seen and examined with Dr. Stewart.  I was present for the IUD removal.  I have reviewed and agree with the above note.    Bushra Reaves MD, FACOG

## 2019-04-23 NOTE — LETTER
2019       RE: Pao Pereira  [address]  [address]  [place] [address]     Dear Colleague,    Thank you for referring your patient, Pao Pereira, to the [facility] at [facility]. Please see a copy of my visit note below.    [facility]  [facility]ual [facility]am    HPI:    Pao Pereira is a 38 year old  (, ), here for well woman exam and discussion of endometriosis. She has lifelong history of abdominal pain, and heavy periods with clotting. In 10/2017 she noted worsening pain and had a pelvic US with endometriomas/ finidings consistent with frozen cul de sac. A month later had the Mirena put in. Her pain is unchanged with this. She is still having monthly menses with Mirena, Q25 days. The bleeding is lighter but lasting for 9 days. No abnormal vaginal discharge, no urinary symptoms. No bowel concerns but mentions ongoing diarrhea, this is not new for her. She is desiring another pregnancy, and would like her IUD removed today.    GYN History  - LMP: Patient's last menstrual period was 2019.  - Menses: Montly Q28 days, light bleeding for 9 days  - Pap Smears: Possible history of abnormal per patient, records unavailable  - Contraception: Mirena IUD put in   - Sexual Activity: Male partners, occasional pain with intercourse  - Sexual Concerns: Occasional pain with intercourse  - Hx STIs: No    OBHx  OB History    Para Term  AB Living   2 2 2 0 0 2   SAB TAB Ectopic Multiple Live Births   0 0 0 0 2      # Outcome Date GA Lbr Amilcar/2nd Weight Sex Delivery Anes PTL Lv   2 Term            1 Term              Past Medical History  - None  - Migraines without aura    Past Surgical History  - None    Medications  - Mirena    Allergies  - None    Social History  - Moved here from [place], for 's job.     Family History  - Maternal Aunt- Breast Cancer    ROS: 10-Point ROS negative except as noted in  "HPI    Preventative Health  Current or historical sexual, physical or mental abuse: No  Feelings of depression: None  Diet: Trying to eat healthier  Exercise: 3 x a week at [facility]    Physical Exam  /81 (BP Location: Left arm, Patient Position: Chair)   Pulse 66   Ht 1.575 m (5' 2\")   Wt 92.3 kg (203 lb 6.4 oz)   LMP 2019   Breastfeeding? No   BMI 37.20 kg/m     Gen: Well-appearing, NAD  HEENT: Normocephalic, atraumatic  Neck: Thyroid is not enlarged, no appreciable masses palpated. Non-tender  CV:  RRR, no m/r/g auscultated  Pulm: CTAB, no w/r/r auscultated  Abd: Soft, non-tender, non-distended  Ext: No LE edema, extremities warm and well perfused    Breast: Symmetric, no nipple discharge or retraction, no axillary lymphadenopathy, no palpable nodules or masses bilaterally    Pelvic:  Normal appearing external female genitalia. Normal hair distribution. Vagina is without lesions.  discharge. Cervix without lesions, no cervical motion tenderness. Uterus difficult to palpate with patients body habitus. No adnexal tenderness or masses palpable.    Current BMI: Body mass index is 37.2 kg/m . (Obese)    IUD Removal Procedure:  Patient placed in lithotomy position. A medium Graves speculum was placed in the vagina. IUD strings visualized easily. Strings grasped with a ringed forceps. IUD was removed with gentle traction. IUD was intact upon removal. Patient tolerated the procedure well.    Assessment/Plan  Pao Pereira is a 38 year old  female here for annual exam, IUD removal, pre-conception counseling, and follow-up of endometriosis.    #Endometriosis  - Lifelong history of abdominal pain and heavy periods  - Worsening in 2017 prompting US with findings consistent with Endometriosis (bilateral endometriomas)  - Mirena was in place (removed today), however was not significantly helping with pain.  - Patient is planning on conceiving, discussed she will likely have improvement in her endometriosis " in pregnancy  - Can consider Mirena placement in post-partum period vs OCPs  -offered follow up ultrasound as her previous was in 2017, she declined today    #Preconception Counseling  - Recommended starting prenatal vitamin or daily folic acid  - Mirena IUD removed  - Previous  x 2  - No history of infertility, pregnancies uncomplicated, with the exception of hypothyroidism in pregnancy. Patient states she took Synthroid during pregnancy and discontinued postpartum. Will get TSH with reflex T4 today.    #Routine Health Maintenance  - Last Pap- Unable to obtain records, performed today  - Mammogram- Not indicated  - Colonoscopy- Not indicated  - Lab Work- TSH, HgbA1, Lipid Panel ordered today.    Follow Up: In one year for annual exam or sooner if needed.    Patient discussed and staffed with Dr. Jean Paul Stewart MD  OB/GYN PGY-1  19 3:27 PM     The patient was seen and examined with Dr. Stewart.  I was present for the IUD removal.  I have reviewed and agree with the above note.    Bushra Reaves MD, FACOG        Again, thank you for allowing me to participate in the care of your patient.      Sincerely,    Bushra Reaves MD

## 2019-04-23 NOTE — PATIENT INSTRUCTIONS

## 2019-04-30 LAB
COPATH REPORT: NORMAL
PAP: NORMAL

## 2019-05-01 LAB
FINAL DIAGNOSIS: NORMAL
HPV HR 12 DNA CVX QL NAA+PROBE: NEGATIVE
HPV16 DNA SPEC QL NAA+PROBE: NEGATIVE
HPV18 DNA SPEC QL NAA+PROBE: NEGATIVE
SPECIMEN DESCRIPTION: NORMAL
SPECIMEN SOURCE CVX/VAG CYTO: NORMAL

## 2019-08-08 ENCOUNTER — TELEPHONE (OUTPATIENT)
Dept: DERMATOLOGY | Facility: CLINIC | Age: 39
End: 2019-08-08

## 2019-08-08 NOTE — TELEPHONE ENCOUNTER
[facility] Call Center    Phone Message    May a detailed message be left on voicemail: yes    Reason for Call: Other: Pt was previously seen for what was thought to be a Staph infection but was not treated for that diagnosis, and now her daughter has been diagnosed with Staph and she wants to know if she should schedule an Appt to be treated for this. Please call to discuss. Thanks    Action Taken: Message routed to:  [facility] ([facility] Derm

## 2019-08-08 NOTE — TELEPHONE ENCOUNTER
I spoke with Pao and she is concerned she has a staph infection in her hands and has accidentally given her child a staph infection as well. Pao states she never had patch testing. I let her know we would need to see her in order to determine best course of tx, pt agreeable and happy for call. Scheduled for 8/9

## 2019-08-09 ENCOUNTER — OFFICE VISIT (OUTPATIENT)
Dept: DERMATOLOGY | Facility: CLINIC | Age: 39
End: 2019-08-09
Payer: COMMERCIAL

## 2019-08-09 DIAGNOSIS — B96.89 SUPERFICIAL BACTERIAL INFECTION OF SKIN: Primary | ICD-10-CM

## 2019-08-09 DIAGNOSIS — L08.9 SUPERFICIAL BACTERIAL INFECTION OF SKIN: Primary | ICD-10-CM

## 2019-08-09 DIAGNOSIS — L24.9 IRRITANT CONTACT DERMATITIS, UNSPECIFIED TRIGGER: ICD-10-CM

## 2019-08-09 ASSESSMENT — PAIN SCALES - GENERAL: PAINLEVEL: EXTREME PAIN (8)

## 2019-08-09 NOTE — LETTER
8/9/2019       RE: Pao Pereira  [address]  [address]  [facility] [address]     Dear Colleague,    Thank you for referring your patient, Pao Pereira, to the [facility] DERMATOLOGY at [facility]. Please see a copy of my visit note below.    [facility] Dermatology Note      Dermatology Problem List:  1. Hand dermatitis, recurrent/chronic  -Referred to Dr. Cortes for patch testing  -bacterial culture pending of the right palm  -continue dilute bleach baths, lidex cream  2. Past history of lip dermatitis  3. Past history of sensitivity to metal jewelry     Encounter Date: Aug 9, 2019    CC:  Chief Complaint   Patient presents with     Derm Problem     Pao is here today to be seen for possible impitago.          History of Present Illness:  Ms. Pao Pereira is a 38 year old female who is a return patient to the clinic that presents for possible impetigo. The patient was last seen on 08/31/2018 by Dr. Valentin for dermatitis on the hands and elbows when the patient was advised to avoid exposure to OTC antibiotic ointment, soaps, slimes, wipes and was recommended dilute bleach soaks to the hands daily, lidex ointment BID, Vaseline BID and referred to [facility] for allergy testing pertaining to her hand condition.     At today's visit, the patient states she is here because her daughter had a rash that was diagnosed as impetigo. She states that the skin on her hands clears up, then breaks open intermittently. The patient notes that she has not been using bleach bath soaks recently because lidex cleared up her skin for a short period. However, her skin would break open after clearing up. Her hands develop blisters that are very itchy, and eventually weep. The patient states that she is in the pool at the [facility] regularly, but has not been there in the past 7 or so days. The patient notes that she used to have patches of dermatitis on her knees, elbows, and  ankles, but after removing her Mirena IUD, the patches cleared up within one month this past spring. She has not had these more psoriaform patches since removing the IUD. The patient is right handed. She notes that she attends kickboxing classes at her [facility] and notices irritation of the hands after using Purell hand . In the last month she got a  at home, so she has not been doing dishes as she had been previosuly. She is still using bleach wipes and baby wipes, but puts on gloves prior to using bleach wipes as it causes irritation. No longer has slime in her home. The patient denies painful, itching, tingling or bleeding lesions unless otherwise noted.      Past Medical History:   Patient Active Problem List   Diagnosis     Irritant contact dermatitis, unspecified trigger     Pruritus     Past Medical History:   Diagnosis Date     Encounter for insertion of mirena IUD 11/06/2017     Past Surgical History:   Procedure Laterality Date     BIOPSY      GYN       Social History:   reports that she has never smoked. She has never used smokeless tobacco. She reports that she drinks about 0.6 oz of alcohol per week. She reports that she does not use drugs.    Family History:  Family History   Problem Relation Age of Onset     Hypertension Father      Thyroid Cancer Maternal Grandmother      Myocardial Infarction Maternal Grandfather      Cerebral aneurysm Maternal Grandfather      Diabetes Maternal Grandfather      Kidney failure Maternal Grandfather         was on dialysis     Thyroid Cancer Maternal Aunt      Bone Cancer Maternal Uncle      Colon Cancer Paternal Aunt        Medications:  Current Outpatient Medications   Medication Sig Dispense Refill     fluocinonide (LIDEX) 0.05 % ointment Twice daily to rash on the hands, elbows, knees for 4 weeks 240 g 1     levonorgestrel (MIRENA) 20 MCG/24HR IUD 1 each by Intrauterine route once         No Known Allergies    Review of Systems:  -Constitutional:  The patient denies fatigue, fevers, chills, unintended weight loss, and night sweats.  -HEENT: Patient denies nonhealing oral sores.  -Skin: As above in HPI. No additional skin concerns.    Physical exam:  Vitals: There were no vitals taken for this visit.  GEN: This is a well developed, well-nourished female in no acute distress, in a pleasant mood.    SKIN: Focused examination of the bilateral hands was performed.  -4x3mm erythematous xerotic plaque with fissuring on the right palm (patient is right handed)  -4x3mm xerotic pink patch on the right anterior wrist without fissuring  -Mild xerosis and erythema with minimal fissuring on the left palm  -Some involvement of the dorsal aspect of the fourth and fifth fingers bilaterally  -No other lesions of concern on areas examined.       Impression/Plan:  1. Hand Dermatitis  - ddx: psoriasis vs potential irritant/allergic component. Was previously referred to [facility] for patch testing but patient did not go due to long wait time. Would like to pursue this now.    Obtained bacterial swab of right hand for testing    Would prefer an oral antibiotic for treatment if bacterial swab confirms presence of bacteria given potential sensitivity to topical antibiotics - daughter currently using topical mupirocin for impetigo and patient has been applying this to neela with Q-tip to avoid contact.    Recommended dilute bleach baths to hands  2-3x weekly    Continue fluocinonide 0.05% ointment BID with flares    Referred to Dr. Cortes for patch testing, for now avoid contactants as per previous note    CC Dr. Dsouza, Dr. Cortes on close of this encounter.  Follow-up prn for new or changing lesions. .       Staff Involved:  Staff/Scribe    Scribe Disclosure:  Kingsley JUDD, am serving as a scribe to document services personally performed by Anita Diego PA-C, based on data collection and the provider's statements to me.     Provider Disclosure:   The documentation  recorded by the scribe accurately reflects the services I personally performed and the decisions made by me.    All risks, benefits and alternatives were discussed with patient.  Patient is in agreement and understands the assessment and plan.  All questions were answered.    Anita Diego PA-C  [facility]: Phone: [phone], Fax: [phone]

## 2019-08-09 NOTE — TELEPHONE ENCOUNTER
Action 8/9/2019 11:16am PP   Action Taken Called pt and previously seen by [facility]. Provided phone number for clinic. Faxed [facility] for recs.        FUTURE VISIT INFORMATION      FUTURE VISIT INFORMATION:    Date: 8/20/2019    Time: 8:00am    Location:  Allergy  REFERRAL INFORMATION:    Referring provider:  Anita Diego PA-C    Referring providers clinic:   Dermatology    Reason for visit/diagnosis:  Patch testing consult.     RECORDS REQUESTED FROM:       Clinic name Comments Records Status Imaging Status    Dermatology 8/9/2019 - Office Visit - Anita Diego PA-C  8/31/2018 - Office Visit - Dr. Brittney Valentin [facility]    [facility] 7/16/2018 - Office Visit - Dr. Brittney Valentin [facility] Not relevant to allergy condition In Epic

## 2019-08-09 NOTE — PROGRESS NOTES
[facility] Dermatology Note      Dermatology Problem List:  1. Hand dermatitis, recurrent/chronic  -Referred to Dr. Cortes for patch testing  -bacterial culture pending of the right palm  -continue dilute bleach baths, lidex cream  2. Past history of lip dermatitis  3. Past history of sensitivity to metal jewelry     Encounter Date: Aug 9, 2019    CC:  Chief Complaint   Patient presents with     Derm Problem     Pao is here today to be seen for possible impitago.          History of Present Illness:  Ms. Pao Pereira is a 38 year old female who is a return patient to the clinic that presents for possible impetigo. The patient was last seen on 08/31/2018 by Dr. Valentin for dermatitis on the hands and elbows when the patient was advised to avoid exposure to OTC antibiotic ointment, soaps, slimes, wipes and was recommended dilute bleach soaks to the hands daily, lidex ointment BID, Vaseline BID and referred to [facility] for allergy testing pertaining to her hand condition.     At today's visit, the patient states she is here because her daughter had a rash that was diagnosed as impetigo. She states that the skin on her hands clears up, then breaks open intermittently. The patient notes that she has not been using bleach bath soaks recently because lidex cleared up her skin for a short period. However, her skin would break open after clearing up. Her hands develop blisters that are very itchy, and eventually weep. The patient states that she is in the pool at the [facility] regularly, but has not been there in the past 7 or so days. The patient notes that she used to have patches of dermatitis on her knees, elbows, and ankles, but after removing her Mirena IUD, the patches cleared up within one month this past spring. She has not had these more psoriaform patches since removing the IUD. The patient is right handed. She notes that she attends kickboxing classes at her [facility] and notices irritation of  the hands after using Purell hand . In the last month she got a  at home, so she has not been doing dishes as she had been previosuly. She is still using bleach wipes and baby wipes, but puts on gloves prior to using bleach wipes as it causes irritation. No longer has slime in her home. The patient denies painful, itching, tingling or bleeding lesions unless otherwise noted.      Past Medical History:   Patient Active Problem List   Diagnosis     Irritant contact dermatitis, unspecified trigger     Pruritus     Past Medical History:   Diagnosis Date     Encounter for insertion of mirena IUD 11/06/2017     Past Surgical History:   Procedure Laterality Date     BIOPSY      GYN       Social History:   reports that she has never smoked. She has never used smokeless tobacco. She reports that she drinks about 0.6 oz of alcohol per week. She reports that she does not use drugs.    Family History:  Family History   Problem Relation Age of Onset     Hypertension Father      Thyroid Cancer Maternal Grandmother      Myocardial Infarction Maternal Grandfather      Cerebral aneurysm Maternal Grandfather      Diabetes Maternal Grandfather      Kidney failure Maternal Grandfather         was on dialysis     Thyroid Cancer Maternal Aunt      Bone Cancer Maternal Uncle      Colon Cancer Paternal Aunt        Medications:  Current Outpatient Medications   Medication Sig Dispense Refill     fluocinonide (LIDEX) 0.05 % ointment Twice daily to rash on the hands, elbows, knees for 4 weeks 240 g 1     levonorgestrel (MIRENA) 20 MCG/24HR IUD 1 each by Intrauterine route once         No Known Allergies    Review of Systems:  -Constitutional: The patient denies fatigue, fevers, chills, unintended weight loss, and night sweats.  -HEENT: Patient denies nonhealing oral sores.  -Skin: As above in HPI. No additional skin concerns.    Physical exam:  Vitals: There were no vitals taken for this visit.  GEN: This is a well  developed, well-nourished female in no acute distress, in a pleasant mood.    SKIN: Focused examination of the bilateral hands was performed.  -4x3mm erythematous xerotic plaque with fissuring on the right palm (patient is right handed)  -4x3mm xerotic pink patch on the right anterior wrist without fissuring  -Mild xerosis and erythema with minimal fissuring on the left palm  -Some involvement of the dorsal aspect of the fourth and fifth fingers bilaterally  -No other lesions of concern on areas examined.       Impression/Plan:  1. Hand Dermatitis  - ddx: psoriasis vs potential irritant/allergic component. Was previously referred to [facility] for patch testing but patient did not go due to long wait time. Would like to pursue this now.    Obtained bacterial swab of right hand for testing    Would prefer an oral antibiotic for treatment if bacterial swab confirms presence of bacteria given potential sensitivity to topical antibiotics - daughter currently using topical mupirocin for impetigo and patient has been applying this to neela with Q-tip to avoid contact.    Recommended dilute bleach baths to hands  2-3x weekly    Continue fluocinonide 0.05% ointment BID with flares    Referred to Dr. Cortes for patch testing, for now avoid contactants as per previous note    CC Dr. Dsouza, Dr. Cortes on close of this encounter.  Follow-up prn for new or changing lesions. .       Staff Involved:  Staff/Scribe    Scribe Disclosure:  DUTCH, Kingsley Landrum, am serving as a scribe to document services personally performed by Anita Diego PA-C, based on data collection and the provider's statements to me.     Provider Disclosure:   The documentation recorded by the scribe accurately reflects the services I personally performed and the decisions made by me.    All risks, benefits and alternatives were discussed with patient.  Patient is in agreement and understands the assessment and plan.  All questions were  answered.    Anita Diego PA-C  [facility]: Phone: [phone], Fax: [phone]

## 2019-08-09 NOTE — NURSING NOTE
Dermatology Rooming Note    Pao Pereira's goals for this visit include:   Chief Complaint   Patient presents with     Derm Problem     Pao is here today to be seen for possible impitago.      FRANCISCO Lee

## 2019-08-12 LAB
BACTERIA SPEC CULT: ABNORMAL
Lab: ABNORMAL
SPECIMEN SOURCE: ABNORMAL

## 2019-08-13 DIAGNOSIS — L08.9 SUPERFICIAL BACTERIAL INFECTION OF SKIN: Primary | ICD-10-CM

## 2019-08-13 DIAGNOSIS — B96.89 SUPERFICIAL BACTERIAL INFECTION OF SKIN: Primary | ICD-10-CM

## 2019-08-13 RX ORDER — CLINDAMYCIN PHOSPHATE 10 UG/ML
LOTION TOPICAL 2 TIMES DAILY
Qty: 60 ML | Refills: 1 | Status: SHIPPED | OUTPATIENT
Start: 2019-08-13 | End: 2019-11-25

## 2019-08-20 ENCOUNTER — PRE VISIT (OUTPATIENT)
Dept: ALLERGY | Facility: CLINIC | Age: 39
End: 2019-08-20

## 2019-08-20 ENCOUNTER — OFFICE VISIT (OUTPATIENT)
Dept: ALLERGY | Facility: CLINIC | Age: 39
End: 2019-08-20
Payer: COMMERCIAL

## 2019-08-20 DIAGNOSIS — J45.20 MILD INTERMITTENT EXTRINSIC ASTHMA WITHOUT COMPLICATION: ICD-10-CM

## 2019-08-20 DIAGNOSIS — L20.89 OTHER ATOPIC DERMATITIS: Primary | ICD-10-CM

## 2019-08-20 ASSESSMENT — PAIN SCALES - GENERAL: PAINLEVEL: NO PAIN (0)

## 2019-08-20 NOTE — LETTER
8/20/2019         RE: Pao Pereira  [address]  [address]  [facility] [address]        Dear Colleague,    Thank you for referring your patient, Pao Pereira, to the [facility] ALLERGY. Please see a copy of my visit note below.    [facility] Dermatology Note      Dermatology Problem List:  1. Subacute-chronic dyshidrotic hand dermatitis with overlying impetigo  -Continue clindamycin lotion twice daily to bilateral hands  -Plan for patch testing in 2 weeks    Encounter Date: Aug 20, 2019    CC:  Chief Complaint   Patient presents with     Allergies     Pao is here for allergy consult.Redness and peeling of the hands .         History of Present Illness:  Ms. Pao Pereira is a 38 year old female who presents as a referral from Dr. Valentin for patch testing.      First began as a rash on the hands, elbows, and knees in 12/17. Notes it began as an irritation that on the right pinky finger that spread to diffusely cover both hands. She is right handed. Was treated with topical clobetasol and oral prednisone with resolution of rash, does not remember if it was helpful. Was intermittently using triple antibiotic ointment, Aveeno lotion, and Eucerin lotion. States rashes on elbows and knees developed in 12/2017 resolved 1 month after getting Mirena IUD removed in 4/2019 and have not recurred since. States hand dermatitis has not resolved since it occurred 2 years ago, will resolve for a day or two before worsening again. Notes daughter was diagnosed with a staph infection last week, was unsure if her daughter got it from her hands. Followed up with Adrianna Diego last week, was told she needed to pursue patch testing. States that her hands intermittently have a honey color with crusting with development of vesicles that burst. Notes hands are very itchy and painful. Has had blistering on her lips in the past. Has been using clindamycin lotion to her bilateral hands twice per day, has not noticed  any obvious difference.    In the past she has had rashes when wearing certain jewelry and earrings as well as irritation due to lip products such as chapstick. No history of atopic dermatitis as a child. Has not been washing dishes, got a  in July. Does wear rubber or latex gloves intermittently, will cover her hands with lotion and wear the gloves to bed. Uses Aurea dish detergent, Soft Soap for hand wash, Ivory body wash, and Suave for shampoo. Was using free and clear body wash for a period of time, was not making any difference so she changed back to a more affordable body wash. Notes Aquaphor, Aveeno, CeraVe, and Eucerin seemed to make her hands more itchy and cause vesicle formation. Has a history of asthma, no history of seasonal allergies. Asthma is worse in the winter. Does not take any antihistamines.    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 Spends a lot of time at the [facility] in the pool with her children a few times per week. Spends a lot of time at playgrounds and doing activities with her kids. Her children like to paint with acrylic paints, she used to paint. She used to swim competitively as a child.     Past Medical History:   Patient Active Problem List   Diagnosis     Irritant contact dermatitis, unspecified trigger     Pruritus     Past Medical History:   Diagnosis Date     Encounter for insertion of mirena IUD 11/06/2017     Past Surgical History:   Procedure Laterality Date     BIOPSY      GYN       Social History:  Patient reports that she has never smoked. She has never used smokeless tobacco. She reports that she drinks about 0.6 oz of alcohol per week. She reports that she does not use drugs.    Family History:  Family History   Problem Relation Age of Onset     Hypertension Father      Thyroid Cancer Maternal Grandmother      Myocardial Infarction Maternal Grandfather      Cerebral aneurysm Maternal Grandfather      Diabetes Maternal  Grandfather      Kidney failure Maternal Grandfather         was on dialysis     Thyroid Cancer Maternal Aunt      Bone Cancer Maternal Uncle      Colon Cancer Paternal Aunt        Medications:  Current Outpatient Medications   Medication Sig Dispense Refill     clindamycin (CLEOCIN T) 1 % external lotion Apply topically 2 times daily 60 mL 1     fluocinonide (LIDEX) 0.05 % ointment Twice daily to rash on the hands, elbows, knees for 4 weeks 240 g 1     levonorgestrel (MIRENA) 20 MCG/24HR IUD 1 each by Intrauterine route once       No Known Allergies      Review of Systems:  -Const: Denies fevers, chills or changes in weight.   -Constitutional: Otherwise feeling well today, in usual state of health.  -HEENT: Patient denies nonhealing oral sores.  -Skin: As above in HPI. No additional skin concerns.    Physical exam:  Vitals: There were no vitals taken for this visit.  GEN: This is a well developed, well-nourished female in no acute distress, in a pleasant mood.    SKIN: Focused examination of the bilateral hands was performed.  -bilateral palmar medial hands are xerotic with scaling and fissuring, worse on the right hand  -right volar wrist with mild xerosis and scaling  -right dorsal hand with mild xerosis and scaling  -No other lesions of concern on areas examined.     Allergy tests:    Atopy Screen (Placed 08/20/19 )    No Substance Readings (15 min) Evaluation   POS Histamine 1mg/ml ++    NEG NaCl 0.9% -      No Substance Readings (15 min) Evaluation   1 Alternaria alternata (tenuis)  -    2 Cladosporium herbarum -    3 Aspergillus fumigatus -    4 Penicillium notatum -    5 Dermatophagoides pteronyssinus +++    6 Dermatophagoides farinae +++    7 Dog epithelium (canis spp) -    8 Cat hair (aida catus) -    9 Cockroach   (Blatella americana & germanica) -    10 Grass mix midwest   (Briana, Orchard, Redtop, Edwin) -    11 Arvind grass (sorghum halepense) -    12 Weed mix   (common Cocklebur, Espinoza s quarters,  rough redroot Pigweed, Dock/Sorrel) -    13 Mug wort (artemisia vulgare) -    14 Ragweed giant/short (ambrosia spp) -    15 English Plantain (plantago lanceolata) -    16 Tree mix 1 (Pecan, Maple BHR, Oak RVW, american [place], black [place]) -    17 Red cedar (juniperus virginia) -    18 Tree mix 2   (white Hay, river/red Birch, black [place], common Suwannee, american Elm) -    19 Box elder/Maple mix (acer spp) +    20 [place] shagbark (carya ovata) -       -      Conclusion: clear sensitization to house dust mites, which correlates with previous Asthma during the winter season, but since pregnancy this sensitization seems not to be clinically relevant. However, a sign for atopic predisposition    Order for PATCH TESTS    [x] Outpatient  [] Inpatient: [facility]..../ Bed ....      Skin Atopy (atopic dermatitis) [x] Yes   [] No ??  Rhinitis/Sinusitis:   [] Yes   [x] No  Allergic Asthma:   [x] Yes   [] No ?  Food Allergy:   [] Yes   [x] No  Leg ulcers:   [] Yes   [x] No  Hand eczema:   [x] Yes   [] No   Leading hand:   [x] R   [] L       [] Ambidextrous                        Reason for tests (suspected allergy): recurrent subacute-chronic dermatitis on palmae and elbows, knees and lips (lip balm)  Known previous allergies: fashion jewelery (metals) and maybe additives in creams or cosmetics (most of them irritate)    Standardized panels  [x] Standard panel (40 tests)  [x] Preservatives & Antimicrobials (31 tests)  [x] Emulsifiers & Additives (25 tests)   [] Perfumes/Flavours & Plants (25 tests)  [] Hairdresser panel (12 tests)  [x] Rubber Chemicals (22 tests)  [x] Plastics (26 tests)  [] Colorants/Dyes/Food additives (20 tests)  [] Metals (implants/dental) (24 tests)  [] Local anaesthetics/NSAIDs (13 tests)  [x] Antibiotics & Antimycotics (14 tests)   [] Corticosteroids (15 tests)   [] Photopatch test (62 tests)   [] others: ...      [] Patient's own products: ...    DO NOT test if chemical or biological identity is  unknown!     always ask from patient the product information and safety sheets (MSDS)     [] Patient needs consultation with Allergy team (changes of tests may apply)  [] Tests discussed with Allergy team (can have direct appointment for patch tests)    Impression/Plan:  1. Subacute-chronic dyshidrotic hand eczema with differential diagnosis including toxic irritant based on atopic predisposition or allergic contact eczema; with overlying impetigo    Atopic predisposition with sensitization to house dust mites    Will plan for patch testing in the next 1-2 weeks; advised that she cannot swim or sweat during the week that she is doing her patch testing    Continue clindamycin lotion twice daily to bilateral hands     Prick testing today positive for house dust mites    CC Anita Diego PA-C  [address] [place] [address] on close of this encounter.  Follow-up in 2-3 weeks, earlier for new or changing lesions.     Staff Involved:  I, Jumana Campos, MS3, saw and examined the patient in the presence of Dr. Cortes.  Staff Physician Comments:  I was present with the medical student who participated in the service and in the documentation of the note. I have verified the history and personally performed the physical exam and medical decision making. I agree with the assessment and plan as documented in the note. I have reviewed and if necessary amended the note.      Diallo Cortes MD  Professor  Head of Dermato-Allergy Division  Department of Dermatology  [facility]             Patient had 22 prick tests placed this visit.    Control Tests (2 tests)    Standard Atopic Panel (20 tests)      Again, thank you for allowing me to participate in the care of your patient.        Sincerely,        Diallo Cortes MD

## 2019-08-20 NOTE — NURSING NOTE
Dermatology Rooming Note    Pao Pereira's goals for this visit include:   Chief Complaint   Patient presents with     Allergies     Pao is here for allergy consult.Redness and peeling of the hands .     Isa Corrales, CMA

## 2019-08-20 NOTE — PROGRESS NOTES
[facility] Dermatology Note      Dermatology Problem List:  1. Subacute-chronic dyshidrotic hand dermatitis with overlying impetigo  -Continue clindamycin lotion twice daily to bilateral hands  -Plan for patch testing in 2 weeks    Encounter Date: Aug 20, 2019    CC:  Chief Complaint   Patient presents with     Allergies     Pao is here for allergy consult.Redness and peeling of the hands .         History of Present Illness:  Ms. Pao Pereira is a 38 year old female who presents as a referral from Dr. Valentin for patch testing.      First began as a rash on the hands, elbows, and knees in 12/17. Notes it began as an irritation that on the right pinky finger that spread to diffusely cover both hands. She is right handed. Was treated with topical clobetasol and oral prednisone with resolution of rash, does not remember if it was helpful. Was intermittently using triple antibiotic ointment, Aveeno lotion, and Eucerin lotion. States rashes on elbows and knees developed in 12/2017 resolved 1 month after getting Mirena IUD removed in 4/2019 and have not recurred since. States hand dermatitis has not resolved since it occurred 2 years ago, will resolve for a day or two before worsening again. Notes daughter was diagnosed with a staph infection last week, was unsure if her daughter got it from her hands. Followed up with Adrianna Diego last week, was told she needed to pursue patch testing. States that her hands intermittently have a honey color with crusting with development of vesicles that burst. Notes hands are very itchy and painful. Has had blistering on her lips in the past. Has been using clindamycin lotion to her bilateral hands twice per day, has not noticed any obvious difference.    In the past she has had rashes when wearing certain jewelry and earrings as well as irritation due to lip products such as chapstick. No history of atopic dermatitis as a child. Has not been washing dishes, got  a  in July. Does wear rubber or latex gloves intermittently, will cover her hands with lotion and wear the gloves to bed. Uses Aurea dish detergent, Soft Soap for hand wash, Ivory body wash, and Suave for shampoo. Was using free and clear body wash for a period of time, was not making any difference so she changed back to a more affordable body wash. Notes Aquaphor, Aveeno, CeraVe, and Eucerin seemed to make her hands more itchy and cause vesicle formation. Has a history of asthma, no history of seasonal allergies. Asthma is worse in the winter. Does not take any antihistamines.    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 Spends a lot of time at the [employer] in the pool with her children a few times per week. Spends a lot of time at playgrounds and doing activities with her kids. Her children like to paint with acrylic paints, she used to paint. She used to swim competitively as a child.     Past Medical History:   Patient Active Problem List   Diagnosis     Irritant contact dermatitis, unspecified trigger     Pruritus     Past Medical History:   Diagnosis Date     Encounter for insertion of mirena IUD 11/06/2017     Past Surgical History:   Procedure Laterality Date     BIOPSY      GYN       Social History:  Patient reports that she has never smoked. She has never used smokeless tobacco. She reports that she drinks about 0.6 oz of alcohol per week. She reports that she does not use drugs.    Family History:  Family History   Problem Relation Age of Onset     Hypertension Father      Thyroid Cancer Maternal Grandmother      Myocardial Infarction Maternal Grandfather      Cerebral aneurysm Maternal Grandfather      Diabetes Maternal Grandfather      Kidney failure Maternal Grandfather         was on dialysis     Thyroid Cancer Maternal Aunt      Bone Cancer Maternal Uncle      Colon Cancer Paternal Aunt        Medications:  Current Outpatient Medications   Medication  Sig Dispense Refill     clindamycin (CLEOCIN T) 1 % external lotion Apply topically 2 times daily 60 mL 1     fluocinonide (LIDEX) 0.05 % ointment Twice daily to rash on the hands, elbows, knees for 4 weeks 240 g 1     levonorgestrel (MIRENA) 20 MCG/24HR IUD 1 each by Intrauterine route once       No Known Allergies      Review of Systems:  -Const: Denies fevers, chills or changes in weight.   -Constitutional: Otherwise feeling well today, in usual state of health.  -HEENT: Patient denies nonhealing oral sores.  -Skin: As above in HPI. No additional skin concerns.    Physical exam:  Vitals: There were no vitals taken for this visit.  GEN: This is a well developed, well-nourished female in no acute distress, in a pleasant mood.    SKIN: Focused examination of the bilateral hands was performed.  -bilateral palmar medial hands are xerotic with scaling and fissuring, worse on the right hand  -right volar wrist with mild xerosis and scaling  -right dorsal hand with mild xerosis and scaling  -No other lesions of concern on areas examined.     Allergy tests:    Atopy Screen (Placed 08/20/19 )    No Substance Readings (15 min) Evaluation   POS Histamine 1mg/ml ++    NEG NaCl 0.9% -      No Substance Readings (15 min) Evaluation   1 Alternaria alternata (tenuis)  -    2 Cladosporium herbarum -    3 Aspergillus fumigatus -    4 Penicillium notatum -    5 Dermatophagoides pteronyssinus +++    6 Dermatophagoides farinae +++    7 Dog epithelium (canis spp) -    8 Cat hair (aida catus) -    9 Cockroach   (Blatella americana & germanica) -    10 Grass mix midwest   (Briana, Orchard, Redtop, Edwin) -    11 Arvind grass (sorghum halepense) -    12 Weed mix   (common Cocklebur, Lamb s quarters, rough redroot Pigweed, Dock/Sorrel) -    13 Mug wort (artemisia vulgare) -    14 Ragweed giant/short (ambrosia spp) -    15 English Plantain (plantago lanceolata) -    16 Tree mix 1 (Pecan, Maple BHR, Oak RVW, american [place], black  [place]) -    17 Red cedar (juniperus virginia) -    18 Tree mix 2   (white Hay, river/red Birch, black [place], common [place], american Elm) -    19 Box elder/Maple mix (acer spp) +    20 [place] shagbark (carya ovata) -       -      Conclusion: clear sensitization to house dust mites, which correlates with previous Asthma during the winter season, but since pregnancy this sensitization seems not to be clinically relevant. However, a sign for atopic predisposition    Order for PATCH TESTS    [x] Outpatient  [] Inpatient: [facility]..../ Bed ....      Skin Atopy (atopic dermatitis) [x] Yes   [] No ??  Rhinitis/Sinusitis:   [] Yes   [x] No  Allergic Asthma:   [x] Yes   [] No ?  Food Allergy:   [] Yes   [x] No  Leg ulcers:   [] Yes   [x] No  Hand eczema:   [x] Yes   [] No   Leading hand:   [x] R   [] L       [] Ambidextrous                        Reason for tests (suspected allergy): recurrent subacute-chronic dermatitis on palmae and elbows, knees and lips (lip balm)  Known previous allergies: fashion jewelery (metals) and maybe additives in creams or cosmetics (most of them irritate)    Standardized panels  [x] Standard panel (40 tests)  [x] Preservatives & Antimicrobials (31 tests)  [x] Emulsifiers & Additives (25 tests)   [] Perfumes/Flavours & Plants (25 tests)  [] Hairdresser panel (12 tests)  [x] Rubber Chemicals (22 tests)  [x] Plastics (26 tests)  [] Colorants/Dyes/Food additives (20 tests)  [] Metals (implants/dental) (24 tests)  [] Local anaesthetics/NSAIDs (13 tests)  [x] Antibiotics & Antimycotics (14 tests)   [] Corticosteroids (15 tests)   [] Photopatch test (62 tests)   [] others: ...      [] Patient's own products: ...    DO NOT test if chemical or biological identity is unknown!     always ask from patient the product information and safety sheets (MSDS)     [] Patient needs consultation with Allergy team (changes of tests may apply)  [] Tests discussed with Allergy team (can have direct appointment for  patch tests)    Impression/Plan:  1. Subacute-chronic dyshidrotic hand eczema with differential diagnosis including toxic irritant based on atopic predisposition or allergic contact eczema; with overlying impetigo    Atopic predisposition with sensitization to house dust mites    Will plan for patch testing in the next 1-2 weeks; advised that she cannot swim or sweat during the week that she is doing her patch testing    Continue clindamycin lotion twice daily to bilateral hands     Prick testing today positive for house dust mites    CC Anita Diego PA-C  [address] [place] [address] on close of this encounter.  Follow-up in 2-3 weeks, earlier for new or changing lesions.     Staff Involved:  I, Jumana Campos, MS3, saw and examined the patient in the presence of Dr. Cortes.  Staff Physician Comments:  I was present with the medical student who participated in the service and in the documentation of the note. I have verified the history and personally performed the physical exam and medical decision making. I agree with the assessment and plan as documented in the note. I have reviewed and if necessary amended the note.      Diallo Cortes MD  Professor  Head of Dermato-Allergy Division  Department of Dermatology  [facility]

## 2019-08-20 NOTE — PROGRESS NOTES
Patient had 22 prick tests placed this visit.    Control Tests (2 tests)    Standard Atopic Panel (20 tests)

## 2019-08-20 NOTE — PATIENT INSTRUCTIONS
Patch Testing Information  What are allergen patch tests?    The test is done to look for skin allergies that may be causing rashes and irritation.    A patch test is a way of identifying whether a substance has caused a delayed reaction with skin inflammation, such as contact eczema or delayed (after days) reactions to drugs.     We will use various types of test compounds, which may include common allergens you may come in contact with in daily life such as preservatives, fragrances or even drugs.     Most of the time we will use standardized, prefabricated test solutions. The choice of the substances and series tested will depend on your history of reactions. Sometimes we will test your own products as well.     In order to avoid severe toxic reactions we need detailed information or safety sheets for each of the test compounds.    The test panels are set up with a small amount of common substances that cause skin allergies. They are taped to your skin with a clear hypoallergenic bandage and reinforced hypoallergenic tape.    The substances are numbered, so it is easy to tell what is causing a skin reaction.  What do I need to do in preparation for the test?    Stop all systemic steroids 1 month prior to the testing.     Stop applying topical steroids to the test area one week prior to the test. It is to use them elsewhere throughout the testing process. If this is not possible then discuss options with the Allergy specialist.    Do not go sunbathing or tanning for one week prior to testing    It is okay to take antihistamines as normal.     Please wear dark colors the week of the test since we will write on you with a dark marker that may transfer and stain clothing or bedding.     Some medications can affect the reactions to allergens during the tests. Therefore reveal all the medications you take to the allergy team. The doctor will discuss the medications with you before the tests.     Eat how you normally  would.    Avoid the following:    You cannot get the test area wet during the entire test period. This means no bathing or swimming the entire test period.    No strenuous exercise that may cause sweating.    Do not scratch the test area, this can cause the allergen to spread and give us false positives.     Avoid exposure to UV irradiation. This means no tanning or UV treatment during the testing period.   What can I expect?    Patch testing is done over three appointments.   o The usual schedule is: Monday (Allergen patches are placed), Wednesday (Patches are removed and skin is examined by the MD), and Friday (Skin is examined by the MD)      If you are allergic, there will be an area of irritation where the test was placed.    Itching or burning at the test site might happen if you are allergic to the allergen.  Do not rub or scratch the test site since this may spread the allergen and possibly cause false positives. If itching or burning is not tolerable please contact the clinic.    The marker we draw on your back with ma take up to 5 weeks to wash off completely. Rubbing alcohol can help speed up this process.     Reactions can occur 1 to 2 weeks after the tests are applied. If this happens please take photos of the area and contact the clinic.  What should I do after the tests are placed?    Keep the area dry. No showering or getting the test area wet from the time we see you at your first visit until after your third appointment. If you get the test area wet you are washing off the test and we could get false negative reactions.    If you notice any of the test patches coming loose put on more tape to re-secure the test.    If the marker that is applied fades you can use a dark pen to krzysztof around the panel sites.    Cover the test area when you are outside to avoid any sun exposure while the test is in place.     You can remove the tests only if there is a severe reaction (itch, pain, blistering). Please  report this to your doctor immediately. If you have to remove the tests please krzysztof the locations of each test field with a grid so we can identify the allergen.  WHAT ARE THE POSSIBLE RISKS OF PATCH TESTS     If you are allergic to a compound tested you will develop a localized skin reaction similar to your previous reaction, this may take days to develop. These reactions include a formation of red, itchy skin lesions that could be about a centimeter with small vesicles or possibly even blisters. The lesions will fade over time, this may take weeks. The test might leave some skin pigmentation for a few months.     In rare cases a localized reaction to patch testing can become generalized.     The tests with your own products might have some risks because they are not standardized and unanticipated reactions could occur. We need as much information as possible to evaluate if your own products are safe to test and under what conditions. This has to be evaluated for each individual product.   Useful Contact Information    To contact your doctor you can either send a [employer] message or call [phone]     Address  o [address] [place] [address]    If you develop any serious or adverse allergic reaction after office hours please seek immediate medical assistance at the nearest clinic, urgent care, or emergency room.    DUST MITE ALLERGY  Dust mites are microscopic bugs that live in dust, feeding on dead skin from our bodies. Dust mites flourish in warm, humid environments and therefore are highest  in number in carpeting, pillows and mattresses.     Tips:    Encase pillows, mattresses, and box springs in zippered allergy proof covers.    Wash all bed linens in at least 1300 F every week.    Remove stuffed animals or freeze them every other week.     Remove upholstered furniture from the bedroom and consider removing the carpet.     Keep ceiling fans off in the bedroom as they can stir up dust mite  allergens.    Frequently dust and vacuum the house, especially the bedroom.    Acaracides (chemicals which kill mites) are available for use in the home. These acaracides require repeated applications to remain effective and may irritate asthmatics. It is still unclear how much, if any clinical benefit is gained by the use of acaracides. Therefore these agents are usually not recommended for initial mite control.        *All information has been reviewed, updated and approved by:  Dr. [facility] updated: 11/2016      Atopy Screen (Placed 08/20/19 )    No Substance Readings (15 min) Evaluation   POS Histamine 1mg/ml ++    NEG NaCl 0.9% -      No Substance Readings (15 min) Evaluation   1 Alternaria alternata (tenuis)  -    2 Cladosporium herbarum -    3 Aspergillus fumigatus -    4 Penicillium notatum -    5 Dermatophagoides pteronyssinus +++    6 Dermatophagoides farinae +++    7 Dog epithelium (canis spp) -    8 Cat hair (aida catus) -    9 Cockroach   (Blatella americana & germanica) -    10 Grass mix midwest   (Briana, Orchard, Redtop, Edwin) -    11 Arvind grass (sorghum halepense) -    12 Weed mix   (common Cocklebur, Lamb s quarters, rough redroot Pigweed, Dock/Sorrel) -    13 Mug wort (artemisia vulgare) -    14 Ragweed giant/short (ambrosia spp) -    15 English Plantain (plantago lanceolata) -    16 Tree mix 1 (Pecan, Maple BHR, Oak RVW, american [place], black [place]) -    17 Red cedar (juniperus virginia) -    18 Tree mix 2   (white Hay, river/red Birch, black [place], common [place], american Elm) -    19 Box elder/Maple mix (acer spp) +    20 [place] shagbark (carya ovata) -       -

## 2019-08-28 ENCOUNTER — DOCUMENTATION ONLY (OUTPATIENT)
Dept: ALLERGY | Facility: CLINIC | Age: 39
End: 2019-08-28

## 2019-08-28 NOTE — PROGRESS NOTES
Order documented by: Isa Corrales CMA  Order reviewed by: Sandie Massey LPN   Physician:    Date/time of application:09/16/2019  Localization of application: Back    STANDARD Series         No Substance 2 days 4 days remarks   1 Horacio Mix [C] - -    2 Colophony - -    3  2-Mercaptobenzothiazole  - -     4 Methylisothiazolinone - -    5 Carba Mix - -    6 Thiuram Mix [A] - -    7 Bisphenol A Epoxy Resin - -    8 O-Nrmf-Uimdwjilwml-Formaldehyde Resin - -    9 Mercapto Mix [A] - -    10 Black Rubber Mix- PPD [B] - -    11 Potassium Dichromate  -  -    12 Balsam of [place] (Myroxylon Pereirae Resin) - -    13 Nickel Sulphate Hexahydrate - -    14 Mixed Dialkyl Thiourea - -    15 Paraben Mix [B] - -    16 Methyldibromo Glutaronitrile - -    17 Fragrance Mix - -    18 2-Bromo-2-Nitropropane-1,3-Diol (Bronopol) - -    19 Lyral - -    20 Tixocortol-21- Pivalate - -    21 Diazolidiyl Urea (Germall II) - -    22 Methyl Methacrylate - -    23 Cobalt (II) Chloride Hexahydrate - -    24 Fragrance Mix II  - -    25 Compositae Mix - -    26 Benzoyl Peroxide - -    27 Bacitracin - -    28 Formaldehyde - -    29 Methylchloroisothiazolinone / Methylisothiazolinone - -    30 Corticosteroid Mix - -    31 Sodium Lauryl Sulfate - -    32 Lanolin Alcohol - -    33 Turpentine - -    34 Cetylstearylalcohol - -    35 Chlorhexidine Dicluconate - -    36 Budenoside - -    37 Imidazolidinyl Urea  - -    38 Ethyl-2 Cyanoacrylate - -    39 Quaternium 15 (Dowicil 200) - -    40 Decyl Glucoside - -      EMULSIFIERS & ADDITIVES        No Substance 2 days 4 days remarks   41 Polyethylene Glycol-400 - -    42 Cocamidopropyl Betaine - -    43 Amerchol L101 - -    44 Propylene Glycol - -    45 Triethanolamine - -    46 Sorbitane Sesquiolate - -    47 Isopropylmyristate - -    48 Polysorbate 80  - -    49 Amidoamine   (Stearamidopropyl Dimethylamine) - -    50 Oleamidopropyl Dimethylamine - -    51 Lauryl Glucoside - -    52 Coconut  Diethanolamide  - -    53 2-Hydroxy-4-Methoxy Benzophenone (Oxybenzone) - -    54 Benzophenone-4 (Sulisobenzon) - -    55 Propolis - -    56 Dexpanthenol - -    57 Carboxymethyl Cellulose Sodium - -    58 Abitol - -    59 Tert-Butylhydroquinone - -    60 Benzyl Salicylate - -     Antioxidant      61 Dodecyl Gallate - -    62 Butylhydroxyanisole (BHA) - -    63 Butylhydroxytoluene (BHT) - -    64 Di-Alpha-Tocopherol (Vit E) - -    65 Propyl Gallate - -      PRESERVATIVES & ANTIMICROBIALS         No Substance 2 days 4 days remarks   66  1,2-Benzisothiazoline-3-One, Sodium Salt - -    67  1,3,5-Rafat (2-Hydroxyethyl) - Hexahydrotriazine (Grotan BK) - -    68 6-Lbjocftuqzryl-2-Nitro-1, 3-Propanediol - -    69  3, 4, 4' - Triclocarban - -    70 4 - Chloro - 3 - Cresol - -    71 4 - Chloro - 4 - Xylenol (PCMX) - -    72 7-Ethylbicyclooxazolidine (illuminate Solutionsan [id number]) - -    73 Benzalkonium Chloride - -    74 Benzyl Alcohol - -    75 Cetalkonium Chloride - -    76 Cetylpyrimidine Chloride  - -    77 Chloroacetamide - -    78 DMDM Hydantoin - -    79 Glutaraldehyde - -    80 Triclosan - -    81 Glyoxal Trimeric Dihydrate - -    82 Iodopropynyl Butylcarbamate - -    83 Octylisothiazoline - -    84 Iodoform - -    85 (Nitrobutyl) Morpholine/(Ethylnitro-Trimethylene) Dimorpholine (illuminate Solutionsan P 1487) - -    86 Phenoxyethanol - -    87 Phenyl Salicylate - -    88 Povidone Iodine - -    89 Sodium Benzoate - -    90 Sodium Disulfite - -    91 Sorbic Acid - -    92 Thimerosal - -     Parabens      93 Butyl-P-Hydroxybenzoate - -    94 Ethyl-P-Hydroxybenzoate - -    95 Methyl-P-Hydroxybenzoate - -    96 Propyl-P-Hydroxybenzoate - -      RUBBER CHEMICALS         No Substance 2 days 4 days remarks    Carbamate      97 Zinc Bis ( Diethyldithio carbamate ) (ZDEC) - -    98 Zinc Bis (Dibutyldithiocarbamate) - -    99 1,3-Diphenylguanidine (DPG) - -     Thiourea      100 Dibutylthiourea - -    101 Diphenyltiourea - -    102 Thiourea - -     Mercapto  chemicals      103 Morpholinyl Mercaptobenzothiazole - -    104 S-Ckixfgfbec-2-Benzothiazyl-Sulfenamide - -    105 Dibenzothiazyl Disulfide - -     Thiuram chemicals      106 Dipentamethylenethiuram Disulfide - -    107 Tetraethylthiuram Disulfide (Disulfiram) - -    108 Tetramethylthiuram Disulfide - -    109 Tetramethyl Thiuram Monosulfide (TMTM) - -     4-Phenylenediamine derivatives      110 N-Isopropyl-N'-Phenyl-P-Phenylenediamine (IPPD) - -    111 Mkcxjnxm-Z-Yejalkivinlsvpnf (DPPD) - -     Various Rubber Additives      112 Hydroquinone Monobenzylether  - -    113 Hexamethylenetetramine - -    114 4,4'-Dihydroxybiphenyl - -    115 Cyclohexylthiophtalimide - -    116 Ethylenediamine Dihydrochloride - -    117 N-Phenyl-B-Naphthylamine - -    118 Dodecyl Mercaptan - -        PLASTICS         No Substance 2 days 4 days remarks    Acrylates - -    119 2-Hydroxyethyl Methacrylate (HEMA) - -    120 1,4-Butandioldimethacrylate (BUDMA) - -    121  2-Ethylhexyl Acrylate - -    122 Bisphenol-A-Dimethacrylate  - -    123 Diurethane-Dimethacrylate - -    124 Ethyleneglycoldimethacrylate (EGDMA) - -    125 Pentaerythritoltriacrylate (BEAR) - -    126 Triethylene Glycol Dimethacrylate (TEGDMA) - -     Synthetic material/additives       127 M-Mhhl-Qvlcnjqmgum - -    128 Tricresyl Phosphate - -    129 4-Zzrl-Ymdxxulnefrwn - -    130 Bis (2-Ethylhexyl) Phthalate - -    131 Dibutylphthalate - -    132 Dimethylphthalate - -    133 Toluene-2,4-Diisocyanate - -    134 Diphenylmethane-4,4''-Diisocyanate - -     EPOXY RESIN SYSTEMS       Reactive Solvents - -    135 Cresyl Glycidyl Ether - -    136 Butyl Glycidyl Ether - -    137 Phenyl Glycidyl Ether - -    138 1,4-Butanediol Diglycidyl Ether - -    139 1,6-Hexanediole Diglycidyl Ether - -     Hardener / Accelerator - -    140 Ethylenediamine Dihydrochloride - -    141 Triethylenetetramine - -    142 Diethylenetriamine - -    143 Isophorone Diamine (IPD) - -    144  N,N-Dimethyl-P-Toluidine - -      ANTIBIOTICS & ANTIMYCOTICS         No Substance 2 days 4 days remarks   145 Erythromycine - -    146 Framycetine Sulphate - -    147 Fusidic Acid Sodium Salt - -    148 Gentamicin Sulphate - -    149 Neomycine Sulphate - -    150 Oxytetracycline  - -    151 Polymyxin B Sulphate - -    152 Tetracycline-HCL - -    153 Sulfanilamide - -    154 Metronidazole - -    155 Oxyquinoline Mix - -    156 Nitrofurazone - -    157 Nystatin - -    158 Clotrimazole - -          Results of patch tests:                         Interpretation:    - Negative                    A    = Allergic      (+) Erythema    TI   = Toxic/irritant   + E + Infiltration    RaP = Relevance at Present     ++ E/I + Papulovesicle   Rpr  = Relevance Previously     +++ E/I/P + Blister     nR   = No Relevance

## 2019-09-03 ENCOUNTER — MYC MEDICAL ADVICE (OUTPATIENT)
Dept: ALLERGY | Facility: CLINIC | Age: 39
End: 2019-09-03

## 2019-09-06 ENCOUNTER — OFFICE VISIT (OUTPATIENT)
Dept: ALLERGY | Facility: CLINIC | Age: 39
End: 2019-09-06
Payer: COMMERCIAL

## 2019-09-06 DIAGNOSIS — L23.89 ALLERGIC CONTACT DERMATITIS DUE TO OTHER AGENTS: Primary | ICD-10-CM

## 2019-09-06 ASSESSMENT — PAIN SCALES - GENERAL: PAINLEVEL: NO PAIN (0)

## 2019-09-06 NOTE — PROGRESS NOTES
[facility] Dermatology Note    Dermatology Surgery Clinic  [facility]  Clinics and Surgery Center  [address] [place] [id number]    Dermatology Problem List:  1. Subacute-chronic dyshidrotic hand dermatitis with overlying impetigo  -Continue clindamycin lotion twice daily to bilateral hands  -Plan for patch testing in 2 weeks    Encounter Date: Sep 6, 2019    CC:  Chief Complaint   Patient presents with     Derm Problem     Pao is here for  derm concern - focus area - hands, currently using rx lotion, patient states she took a bendryl on Sunday.        History of Present Illness:  Ms. Pao Pereira is a 38 year old female who presents as a referral from Dr. Valentin for patch testing.      First began as a rash on the hands, elbows, and knees in 12/17. Notes it began as an irritation that on the right pinky finger that spread to diffusely cover both hands. She is right handed. Was treated with topical clobetasol and oral prednisone with resolution of rash, does not remember if it was helpful. Was intermittently using triple antibiotic ointment, Aveeno lotion, and Eucerin lotion. States rashes on elbows and knees developed in 12/2017 resolved 1 month after getting Mirena IUD removed in 4/2019 and have not recurred since. States hand dermatitis has not resolved since it occurred 2 years ago, will resolve for a day or two before worsening again. Notes daughter was diagnosed with a staph infection last week, was unsure if her daughter got it from her hands. Followed up with Adrianna Diego last week, was told she needed to pursue patch testing. States that her hands intermittently have a honey color with crusting with development of vesicles that burst. Notes hands are very itchy and painful. Has had blistering on her lips in the past. Has been using clindamycin lotion to her bilateral hands twice per day, has not noticed any obvious difference.    In the past she has had  "rashes when wearing certain jewelry and earrings as well as irritation due to lip products such as chapstick. No history of atopic dermatitis as a child. Has not been washing dishes, got a  in July. Does wear rubber or latex gloves intermittently, will cover her hands with lotion and wear the gloves to bed. Uses Aurea dish detergent, Soft Soap for hand wash, Ivory body wash, and Suave for shampoo. Was using free and clear body wash for a period of time, was not making any difference so she changed back to a more affordable body wash. Notes Aquaphor, Aveeno, CeraVe, and Eucerin seemed to make her hands more itchy and cause vesicle formation. Has a history of asthma, no history of seasonal allergies. Asthma is worse in the winter. Does not take any antihistamines.    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 Spends a lot of time at the [facility] in the pool with her children a few times per week. Spends a lot of time at playgrounds and doing activities with her kids. Her children like to paint with acrylic paints, she used to paint. She used to swim competitively as a child.     History since 08/20/19:  The patient comes in today (9/6/19) for followup. She is accompanied by her child son and child daughter.    She reports that on Thursday and Friday of last week, she had inflammation and severe pain of both hands. She feels that her symptoms are starting to radiate medially.  The patient states that she was to start back up the clindamycin this week, and she has been using this followed by placing her hands in blue gloves. She only does the blue gloves in the morning and doesn't do any other washes. She shares that she hadn't done her usual bleach bath this past week due to using clindamycin. She also started a prenatal recently and noticed that her eczema of the hands seemed to \"break out\"; she is unsure if there is a correlation.     She uses Suave shampoo to wash her " hair. Discussed using Vanicream lotion, but the patient notes that this is accompanied by a burning sensation. For the body she uses Ivory soap, and there is sometimes a burning sensation.     The patient shares that there is quite a bit of drying on the skin of both hands today. Overall, she does feel that the hands are improving. She notes that, via her mother's suggestion, she used metahoney, and the inflammation of the hands worsened.       Past Medical History:   Patient Active Problem List   Diagnosis     Irritant contact dermatitis, unspecified trigger     Pruritus     Past Medical History:   Diagnosis Date     Encounter for insertion of mirena IUD 11/06/2017     Past Surgical History:   Procedure Laterality Date     BIOPSY      GYN       Social History:  Patient reports that she has never smoked. She has never used smokeless tobacco. She reports that she drinks about 0.6 oz of alcohol per week. She reports that she does not use drugs.    Family History:  Family History   Problem Relation Age of Onset     Hypertension Father      Thyroid Cancer Maternal Grandmother      Myocardial Infarction Maternal Grandfather      Cerebral aneurysm Maternal Grandfather      Diabetes Maternal Grandfather      Kidney failure Maternal Grandfather         was on dialysis     Thyroid Cancer Maternal Aunt      Bone Cancer Maternal Uncle      Colon Cancer Paternal Aunt        Medications:  Current Outpatient Medications   Medication Sig Dispense Refill     clindamycin (CLEOCIN T) 1 % external lotion Apply topically 2 times daily 60 mL 1     fluocinonide (LIDEX) 0.05 % ointment Twice daily to rash on the hands, elbows, knees for 4 weeks 240 g 1     levonorgestrel (MIRENA) 20 MCG/24HR IUD 1 each by Intrauterine route once       No Known Allergies      Review of Systems:  -Const: Denies fevers, chills or changes in weight.   -Constitutional: Otherwise feeling well today, in usual state of health.  -HEENT: Patient denies nonhealing  oral sores.  -Skin: As above in HPI. No additional skin concerns.    Physical exam:  Vitals: There were no vitals taken for this visit.  GEN: This is a well developed, well-nourished female in no acute distress, in a pleasant mood.    SKIN: Focused examination of the hands was performed.  Subacute to acute eczemas on both hands  -No other lesions of concern on areas examined.     Allergy tests:    Atopy Screen (Placed 08/20/19 )    No Substance Readings (15 min) Evaluation   POS Histamine 1mg/ml ++    NEG NaCl 0.9% -      No Substance Readings (15 min) Evaluation   1 Alternaria alternata (tenuis)  -    2 Cladosporium herbarum -    3 Aspergillus fumigatus -    4 Penicillium notatum -    5 Dermatophagoides pteronyssinus +++    6 Dermatophagoides farinae +++    7 Dog epithelium (canis spp) -    8 Cat hair (aida catus) -    9 Cockroach   (Blatella americana & germanica) -    10 Grass mix midwest   (Briana, Orchard, Redtop, Edwin) -    11 Arvind grass (sorghum halepense) -    12 Weed mix   (common Cocklebur, Lamb s quarters, rough redroot Pigweed, Dock/Sorrel) -    13 Mug wort (artemisia vulgare) -    14 Ragweed giant/short (ambrosia spp) -    15 English Plantain (plantago lanceolata) -    16 Tree mix 1 (Pecan, Maple BHR, Oak RVW, american [place], black [place]) -    17 Red cedar (juniperus virginia) -    18 Tree mix 2   (white Hay, river/red Birch, black [place], common [place], american Elm) -    19 Box elder/Maple mix (acer spp) +    20 [place] shagbark (carya ovata) -       -      Conclusion: clear sensitization to house dust mites, which correlates with previous Asthma during the winter season, but since pregnancy this sensitization seems not to be clinically relevant. However, a sign for atopic predisposition    Order for PATCH TESTS    [x] Outpatient  [] Inpatient: Bueno..../ Bed ....      Skin Atopy (atopic dermatitis) [x] Yes   [] No ??  Rhinitis/Sinusitis:   [] Yes   [x] No  Allergic Asthma:   [x] Yes   []  No ?  Food Allergy:   [] Yes   [x] No  Leg ulcers:   [] Yes   [x] No  Hand eczema:   [x] Yes   [] No   Leading hand:   [x] R   [] L       [] Ambidextrous                        Reason for tests (suspected allergy): recurrent subacute-chronic dermatitis on palmae and elbows, knees and lips (lip balm)  Known previous allergies: fashion jewelery (metals) and maybe additives in creams or cosmetics (most of them irritate)    Standardized panels  [x] Standard panel (40 tests)  [x] Preservatives & Antimicrobials (31 tests)  [x] Emulsifiers & Additives (25 tests)   [] Perfumes/Flavours & Plants (25 tests)  [] Hairdresser panel (12 tests)  [x] Rubber Chemicals (22 tests)  [x] Plastics (26 tests)  [] Colorants/Dyes/Food additives (20 tests)  [] Metals (implants/dental) (24 tests)  [] Local anaesthetics/NSAIDs (13 tests)  [x] Antibiotics & Antimycotics (14 tests)   [] Corticosteroids (15 tests)   [] Photopatch test (62 tests)   [] others: ...      [] Patient's own products: ...    DO NOT test if chemical or biological identity is unknown!     always ask from patient the product information and safety sheets (MSDS)     [] Patient needs consultation with Allergy team (changes of tests may apply)  [] Tests discussed with Allergy team (can have direct appointment for patch tests)    Impression/Plan:  1. Subacute-chronic dyshidrotic hand eczema with differential diagnosis including toxic irritant based on atopic predisposition or allergic contact eczema; with overlying impetigo      Atopic predisposition with sensitization to house dust mites    Continue patch testing in the next 1 week (9/16/19); advised that she cannot swim or sweat during the week that she is doing her patch testing. She was encouraged to use Lidex ointment daily until she undergoes patch testing.     Continue clindamycin lotion twice daily to bilateral hands     Prick testing at last visit, positive for house dust mites    9/6/19 Prescribed 10% iodine solution  for the bilateral hands. She can dilute it 1:10 and soak her hands in it for 4-10 minutes.    Suave for shampoo and Ivory for body soap.     9/6/19 Prescribed gentian violet; we will prescribe her this to put on prior her lidex ointment        CC Anita Diego PA-C  [address] [place] [address] on close of this encounter.  Follow-up in 2-3 weeks, earlier for new or changing lesions.     Staff Involved:    Scribe Disclosure  I, Pao Trinidad, am serving as a scribe to document services personally performed by Dr. Diallo Crotes, based on data collection and the provider's statements to me.     I spent a total of 25 min face to face with Randall Cedeno during today's office visit. About 50% of the time was spent counseling the patient and/or coordinating care regarding their allergy.

## 2019-09-06 NOTE — NURSING NOTE
Chief Complaint   Patient presents with     Derm Problem     Pao is here for  derm concern - focus area - hands      Ivory Gonzales, DANUTAN

## 2019-09-06 NOTE — LETTER
9/6/2019         RE: Pao Pereira  [address]  [address]  [facility] [address]        Dear Colleague,    Thank you for referring your patient, Pao Pereira, to the [facility] ALLER[facility]. Please see a copy of my visit note below.    [facility] Dermatology Note    Dermatology Surgery Clinic  [facility]  Clinics and Surgery Center  [address], [place] [address]    Dermatology Problem List:  1. Subacute-chronic dyshidrotic hand dermatitis with overlying impetigo  -Continue clindamycin lotion twice daily to bilateral hands  -Plan for patch testing in 2 weeks    Encounter Date: Sep 6, 2019    CC:  Chief Complaint   Patient presents with     Derm Problem     Pao is here for  derm concern - focus area - hands, currently using rx lotion, patient states she took a bendryl on Sunday.        History of Present Illness:  Ms. Pao Pereira is a 38 year old female who presents as a referral from Dr. Valentin for patch testing.      First began as a rash on the hands, elbows, and knees in 12/17. Notes it began as an irritation that on the right pinky finger that spread to diffusely cover both hands. She is right handed. Was treated with topical clobetasol and oral prednisone with resolution of rash, does not remember if it was helpful. Was intermittently using triple antibiotic ointment, Aveeno lotion, and Eucerin lotion. States rashes on elbows and knees developed in 12/2017 resolved 1 month after getting Mirena IUD removed in 4/2019 and have not recurred since. States hand dermatitis has not resolved since it occurred 2 years ago, will resolve for a day or two before worsening again. Notes daughter was diagnosed with a staph infection last week, was unsure if her daughter got it from her hands. Followed up with Adrianna Diego last week, was told she needed to pursue patch testing. States that her hands intermittently have a honey color with crusting with development of vesicles  that burst. Notes hands are very itchy and painful. Has had blistering on her lips in the past. Has been using clindamycin lotion to her bilateral hands twice per day, has not noticed any obvious difference.    In the past she has had rashes when wearing certain jewelry and earrings as well as irritation due to lip products such as chapstick. No history of atopic dermatitis as a child. Has not been washing dishes, got a  in July. Does wear rubber or latex gloves intermittently, will cover her hands with lotion and wear the gloves to bed. Uses Aurea dish detergent, Soft Soap for hand wash, Ivory body wash, and Suave for shampoo. Was using free and clear body wash for a period of time, was not making any difference so she changed back to a more affordable body wash. Notes Aquaphor, Aveeno, CeraVe, and Eucerin seemed to make her hands more itchy and cause vesicle formation. Has a history of asthma, no history of seasonal allergies. Asthma is worse in the winter. Does not take any antihistamines.    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 Spends a lot of time at the [facility] in the pool with her children a few times per week. Spends a lot of time at playgrounds and doing activities with her kids. Her children like to paint with acrylic paints, she used to paint. She used to swim competitively as a child.     History since 08/20/19:  The patient comes in today (9/6/19) for followup. She is accompanied by her child son and child daughter.    She reports that on Thursday and Friday of last week, she had inflammation and severe pain of both hands. She feels that her symptoms are starting to radiate medially.  The patient states that she was to start back up the clindamycin this week, and she has been using this followed by placing her hands in blue gloves. She only does the blue gloves in the morning and doesn't do any other washes. She shares that she hadn't done her usual  "bleach bath this past week due to using clindamycin. She also started a prenatal recently and noticed that her eczema of the hands seemed to \"break out\"; she is unsure if there is a correlation.     She uses Suave shampoo to wash her hair. Discussed using Vanicream lotion, but the patient notes that this is accompanied by a burning sensation. For the body she uses Ivory soap, and there is sometimes a burning sensation.     The patient shares that there is quite a bit of drying on the skin of both hands today. Overall, she does feel that the hands are improving. She notes that, via her mother's suggestion, she used metahoney, and the inflammation of the hands worsened.       Past Medical History:   Patient Active Problem List   Diagnosis     Irritant contact dermatitis, unspecified trigger     Pruritus     Past Medical History:   Diagnosis Date     Encounter for insertion of mirena IUD 11/06/2017     Past Surgical History:   Procedure Laterality Date     BIOPSY      GYN       Social History:  Patient reports that she has never smoked. She has never used smokeless tobacco. She reports that she drinks about 0.6 oz of alcohol per week. She reports that she does not use drugs.    Family History:  Family History   Problem Relation Age of Onset     Hypertension Father      Thyroid Cancer Maternal Grandmother      Myocardial Infarction Maternal Grandfather      Cerebral aneurysm Maternal Grandfather      Diabetes Maternal Grandfather      Kidney failure Maternal Grandfather         was on dialysis     Thyroid Cancer Maternal Aunt      Bone Cancer Maternal Uncle      Colon Cancer Paternal Aunt        Medications:  Current Outpatient Medications   Medication Sig Dispense Refill     clindamycin (CLEOCIN T) 1 % external lotion Apply topically 2 times daily 60 mL 1     fluocinonide (LIDEX) 0.05 % ointment Twice daily to rash on the hands, elbows, knees for 4 weeks 240 g 1     levonorgestrel (MIRENA) 20 MCG/24HR IUD 1 each by " Intrauterine route once       No Known Allergies      Review of Systems:  -Const: Denies fevers, chills or changes in weight.   -Constitutional: Otherwise feeling well today, in usual state of health.  -HEENT: Patient denies nonhealing oral sores.  -Skin: As above in HPI. No additional skin concerns.    Physical exam:  Vitals: There were no vitals taken for this visit.  GEN: This is a well developed, well-nourished female in no acute distress, in a pleasant mood.    SKIN: Focused examination of the hands was performed.  Subacute to acute eczemas on both hands  -No other lesions of concern on areas examined.     Allergy tests:    Atopy Screen (Placed 08/20/19 )    No Substance Readings (15 min) Evaluation   POS Histamine 1mg/ml ++    NEG NaCl 0.9% -      No Substance Readings (15 min) Evaluation   1 Alternaria alternata (tenuis)  -    2 Cladosporium herbarum -    3 Aspergillus fumigatus -    4 Penicillium notatum -    5 Dermatophagoides pteronyssinus +++    6 Dermatophagoides farinae +++    7 Dog epithelium (canis spp) -    8 Cat hair (aida catus) -    9 Cockroach   (Blatella americana & germanica) -    10 Grass mix midwest   (Briana, Orchard, Redtop, Edwin) -    11 Arvind grass (sorghum halepense) -    12 Weed mix   (common Cocklebur, Lamb s quarters, rough redroot Pigweed, Dock/Sorrel) -    13 Mug wort (artemisia vulgare) -    14 Ragweed giant/short (ambrosia spp) -    15 [place] Plantain (plantago lanceolata) -    16 Tree mix 1 (Pecan, Maple BHR, Oak RVW, american [place], black [place]) -    17 Red cedar (juniperus virginia) -    18 Tree mix 2   (white Hay, river/red Birch, black [place], common [place], american Elm) -    19 Box elder/Maple mix (acer spp) +    20 [place] shagbark (carya ovata) -       -      Conclusion: clear sensitization to house dust mites, which correlates with previous Asthma during the winter season, but since pregnancy this sensitization seems not to be clinically relevant. However, a  sign for atopic predisposition    Order for PATCH TESTS    [x] Outpatient  [] Inpatient: Bueno..../ Bed ....      Skin Atopy (atopic dermatitis) [x] Yes   [] No ??  Rhinitis/Sinusitis:   [] Yes   [x] No  Allergic Asthma:   [x] Yes   [] No ?  Food Allergy:   [] Yes   [x] No  Leg ulcers:   [] Yes   [x] No  Hand eczema:   [x] Yes   [] No   Leading hand:   [x] R   [] L       [] Ambidextrous                        Reason for tests (suspected allergy): recurrent subacute-chronic dermatitis on palmae and elbows, knees and lips (lip balm)  Known previous allergies: fashion jewelery (metals) and maybe additives in creams or cosmetics (most of them irritate)    Standardized panels  [x] Standard panel (40 tests)  [x] Preservatives & Antimicrobials (31 tests)  [x] Emulsifiers & Additives (25 tests)   [] Perfumes/Flavours & Plants (25 tests)  [] Hairdresser panel (12 tests)  [x] Rubber Chemicals (22 tests)  [x] Plastics (26 tests)  [] Colorants/Dyes/Food additives (20 tests)  [] Metals (implants/dental) (24 tests)  [] Local anaesthetics/NSAIDs (13 tests)  [x] Antibiotics & Antimycotics (14 tests)   [] Corticosteroids (15 tests)   [] Photopatch test (62 tests)   [] others: ...      [] Patient's own products: ...    DO NOT test if chemical or biological identity is unknown!     always ask from patient the product information and safety sheets (MSDS)     [] Patient needs consultation with Allergy team (changes of tests may apply)  [] Tests discussed with Allergy team (can have direct appointment for patch tests)    Impression/Plan:  1. Subacute-chronic dyshidrotic hand eczema with differential diagnosis including toxic irritant based on atopic predisposition or allergic contact eczema; with overlying impetigo      Atopic predisposition with sensitization to house dust mites    Continue patch testing in the next 1 week (9/16/19); advised that she cannot swim or sweat during the week that she is doing her patch testing. She was  encouraged to use Lidex ointment daily until she undergoes patch testing.     Continue clindamycin lotion twice daily to bilateral hands     Prick testing at last visit, positive for house dust mites    9/6/19 Prescribed 10% iodine solution for the bilateral hands. She can dilute it 1:10 and soak her hands in it for 4-10 minutes.    Suave for shampoo and Ivory for body soap.     9/6/19 Prescribed gentian violet; we will prescribe her this to put on prior her lidex ointment        CC Anita Diego PA-C  [address] [place] [phone] on close of this encounter.  Follow-up in 2-3 weeks, earlier for new or changing lesions.     Staff Involved:    Scribe Disclosure  I, Pao Trinidad, am serving as a scribe to document services personally performed by Dr. Diallo Cortes, based on data collection and the provider's statements to me.     I spent a total of 25 min face to face with Randall Cedeno during today's office visit. About 50% of the time was spent counseling the patient and/or coordinating care regarding their allergy.                             Again, thank you for allowing me to participate in the care of your patient.        Sincerely,        Diallo Cortes MD

## 2019-09-16 ENCOUNTER — OFFICE VISIT (OUTPATIENT)
Dept: ALLERGY | Facility: CLINIC | Age: 39
End: 2019-09-16
Payer: COMMERCIAL

## 2019-09-16 DIAGNOSIS — L23.89 ALLERGIC CONTACT DERMATITIS DUE TO OTHER AGENTS: Primary | ICD-10-CM

## 2019-09-16 DIAGNOSIS — L20.89 OTHER ATOPIC DERMATITIS: ICD-10-CM

## 2019-09-16 ASSESSMENT — PAIN SCALES - GENERAL: PAINLEVEL: NO PAIN (0)

## 2019-09-16 NOTE — NURSING NOTE
Dermatology Rooming Note    Pao Pereira's goals for this visit include:   Chief Complaint   Patient presents with     Allergies     Pao is here  for allergy testing day 1     Isa Corrales, CMA

## 2019-09-16 NOTE — PROGRESS NOTES
[facility] Allergy Note    Allergy Clinic  [facility]  [address] [place] [id number]    Allergy Problem List:  1. Subacute-chronic dyshidrotic hand dermatitis with overlying impetigo  -Continue clindamycin lotion twice daily to bilateral hands  -Patch testing 9/16/19    Encounter Date: Sep 16, 2019    CC:  Chief Complaint   Patient presents with     Allergies     Pao is here  for allergy testing day 1       History of Present Illness:  Ms. Pao Pereira is a 38 year old female who presents as a referral from Dr. Valentin for patch testing.      First began as a rash on the hands, elbows, and knees in 12/17. Notes it began as an irritation that on the right pinky finger that spread to diffusely cover both hands. She is right handed. Was treated with topical clobetasol and oral prednisone with resolution of rash, does not remember if it was helpful. Was intermittently using triple antibiotic ointment, Aveeno lotion, and Eucerin lotion. States rashes on elbows and knees developed in 12/2017 resolved 1 month after getting Mirena IUD removed in 4/2019 and have not recurred since. States hand dermatitis has not resolved since it occurred 2 years ago, will resolve for a day or two before worsening again. Notes daughter was diagnosed with a staph infection last week, was unsure if her daughter got it from her hands. Followed up with Adrianna Diego last week, was told she needed to pursue patch testing. States that her hands intermittently have a honey color with crusting with development of vesicles that burst. Notes hands are very itchy and painful. Has had blistering on her lips in the past. Has been using clindamycin lotion to her bilateral hands twice per day, has not noticed any obvious difference.    In the past she has had rashes when wearing certain jewelry and earrings as well as irritation due to lip products such as chapstick. No history  "of atopic dermatitis as a child. Has not been washing dishes, got a  in July. Does wear rubber or latex gloves intermittently, will cover her hands with lotion and wear the gloves to bed. Uses Aurea dish detergent, Soft Soap for hand wash, Ivory body wash, and Suave for shampoo. Was using free and clear body wash for a period of time, was not making any difference so she changed back to a more affordable body wash. Notes Aquaphor, Aveeno, CeraVe, and Eucerin seemed to make her hands more itchy and cause vesicle formation. Has a history of asthma, no history of seasonal allergies. Asthma is worse in the winter. Does not take any antihistamines.    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 Spends a lot of time at the [facility] in the pool with her children a few times per week. Spends a lot of time at playgrounds and doing activities with her kids. Her children like to paint with acrylic paints, she used to paint. She used to swim competitively as a child.     History since 08/20/19:  The patient comes in today (9/6/19) for followup. She is accompanied by her child son and child daughter.    She reports that on Thursday and Friday of last week, she had inflammation and severe pain of both hands. She feels that her symptoms are starting to radiate medially.  The patient states that she was to start back up the clindamycin this week, and she has been using this followed by placing her hands in blue gloves. She only does the blue gloves in the morning and doesn't do any other washes. She shares that she hadn't done her usual bleach bath this past week due to using clindamycin. She also started a prenatal recently and noticed that her eczema of the hands seemed to \"break out\"; she is unsure if there is a correlation.     She uses Suave shampoo to wash her hair. Discussed using Vanicream lotion, but the patient notes that this is accompanied by a burning sensation. For the " body she uses Ivory soap, and there is sometimes a burning sensation.     The patient shares that there is quite a bit of drying on the skin of both hands today. Overall, she does feel that the hands are improving. She notes that, via her mother's suggestion, she used metahoney, and the inflammation of the hands worsened.     Hx since 9/6/19:  Today the pt comes in for patch testing day 1 (9/16/19). She is accompanied by her mother. Pt reports that her hands have improved. However, there remains some pruritis.    She denies using tea tree oil. She shares that she uses aluminum free roll-on deodorant.      Past Medical History:   Patient Active Problem List   Diagnosis     Irritant contact dermatitis, unspecified trigger     Pruritus     Past Medical History:   Diagnosis Date     Encounter for insertion of mirena IUD 11/06/2017     Past Surgical History:   Procedure Laterality Date     BIOPSY      GYN       Social History:  Patient reports that she has never smoked. She has never used smokeless tobacco. She reports that she drinks about 0.6 oz of alcohol per week. She reports that she does not use drugs. 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 Spends a lot of time at the [employer] in the pool with her children a few times per week. Spends a lot of time at playgrounds and doing activities with her kids. Her children like to paint with acrylic paints, she used to paint. She used to swim competitively as a child.     Family History:  Family History   Problem Relation Age of Onset     Hypertension Father      Thyroid Cancer Maternal Grandmother      Myocardial Infarction Maternal Grandfather      Cerebral aneurysm Maternal Grandfather      Diabetes Maternal Grandfather      Kidney failure Maternal Grandfather         was on dialysis     Thyroid Cancer Maternal Aunt      Bone Cancer Maternal Uncle      Colon Cancer Paternal Aunt        Medications:  Current Outpatient Medications    Medication Sig Dispense Refill     clindamycin (CLEOCIN T) 1 % external lotion Apply topically 2 times daily 60 mL 1     fluocinonide (LIDEX) 0.05 % ointment Twice daily to rash on the hands, elbows, knees for 4 weeks 240 g 1     gentian violet 1 % external solution Paint eczematous lesions on hands before adding the Lidex oint 59 mL 3     levonorgestrel (MIRENA) 20 MCG/24HR IUD 1 each by Intrauterine route once       No Known Allergies      Review of Systems:  -Const: Denies fevers, chills or changes in weight.   -Constitutional: Otherwise feeling well today, in usual state of health.  -HEENT: Patient denies nonhealing oral sores.  -Skin: As above in HPI. No additional skin concerns.    Physical exam:  Vitals: There were no vitals taken for this visit.  GEN: This is a well developed, well-nourished female in no acute distress, in a pleasant mood.    SKIN: Focused examination of the hands was performed.  Subacute to acute eczemas on both hands  -No other lesions of concern on areas examined.     Allergy tests:    Atopy Screen (Placed 08/20/19 )    No Substance Readings (15 min) Evaluation   POS Histamine 1mg/ml ++    NEG NaCl 0.9% -      No Substance Readings (15 min) Evaluation   1 Alternaria alternata (tenuis)  -    2 Cladosporium herbarum -    3 Aspergillus fumigatus -    4 Penicillium notatum -    5 Dermatophagoides pteronyssinus +++    6 Dermatophagoides farinae +++    7 Dog epithelium (canis spp) -    8 Cat hair (aida catus) -    9 Cockroach   (Blatella americana & germanica) -    10 Grass mix midwest   (Briana, Orchard, Redtop, Edwin) -    11 Arvind grass (sorghum halepense) -    12 Weed mix   (common Cocklebur, Lamb s quarters, rough redroot Pigweed, Dock/Sorrel) -    13 Mug wort (artemisia vulgare) -    14 Ragweed giant/short (ambrosia spp) -    15 English Plantain (plantago lanceolata) -    16 Tree mix 1 (Pecan, Maple BHR, Oak RVW, american [place], black [place]) -    17 Red cedar (juniperus virginia)  -    18 Tree mix 2   (white Hay, river/red Birch, black [place], common [place], american Elm) -    19 Box elder/Maple mix (acer spp) +    20 Sullivan shagbark (carya ovata) -       -      Conclusion: clear sensitization to house dust mites, which correlates with previous Asthma during the winter season, but since pregnancy this sensitization seems not to be clinically relevant. However, a sign for atopic predisposition    Order for PATCH TESTS    [x] Outpatient  [] Inpatient: [facility]..../ Bed ....      Skin Atopy (atopic dermatitis) [x] Yes   [] No ??  Rhinitis/Sinusitis:   [] Yes   [x] No  Allergic Asthma:   [x] Yes   [] No ?  Food Allergy:   [] Yes   [x] No  Leg ulcers:   [] Yes   [x] No  Hand eczema:   [x] Yes   [] No   Leading hand:   [x] R   [] L       [] Ambidextrous                        Reason for tests (suspected allergy): recurrent subacute-chronic dermatitis on palmae and elbows, knees and lips (lip balm)  Known previous allergies: fashion jewelery (metals) and maybe additives in creams or cosmetics (most of them irritate)    Standardized panels  [x] Standard panel (40 tests)  [x] Preservatives & Antimicrobials (31 tests)  [x] Emulsifiers & Additives (25 tests)   [] Perfumes/Flavours & Plants (25 tests)  [] Hairdresser panel (12 tests)  [x] Rubber Chemicals (22 tests)  [x] Plastics (26 tests)  [] Colorants/Dyes/Food additives (20 tests)  [] Metals (implants/dental) (24 tests)  [] Local anaesthetics/NSAIDs (13 tests)  [x] Antibiotics & Antimycotics (14 tests)   [] Corticosteroids (15 tests)   [] Photopatch test (62 tests)   [] others: ...      [] Patient's own products: ...    DO NOT test if chemical or biological identity is unknown!     always ask from patient the product information and safety sheets (MSDS)   RESULTS & EVALUATION of PATCH TESTS    Patch test readings after     [x] 2 days, [] 3 days [x] 4 days, [] 5 days,    Applied patch tests with results (import here the list of patch tests):  Order  documented by: Isa Corrales CMA  Order reviewed by: Sandie Massey LPN   Physician:    Date/time of application:09/16/2019  Localization of application: Back >>> no eczema of the back    STANDARD Series         No Substance 2 days 4 days remarks   1 Horacio Mix [C] - -    2 Colophony - -    3  2-Mercaptobenzothiazole  - -     4 Methylisothiazolinone - -    5 Carba Mix - -    6 Thiuram Mix [A] - -    7 Bisphenol A Epoxy Resin - -    8 F-Zidj-Pwczdflimtl-Formaldehyde Resin - -    9 Mercapto Mix [A] - -    10 Black Rubber Mix- PPD [B] - -    11 Potassium Dichromate  -  -    12 Balsam of [place] (Myroxylon Pereirae Resin) - -    13 Nickel Sulphate Hexahydrate - -    14 Mixed Dialkyl Thiourea - -    15 Paraben Mix [B] - -    16 Methyldibromo Glutaronitrile - -    17 Fragrance Mix - -    18 2-Bromo-2-Nitropropane-1,3-Diol (Bronopol) - -    19 Lyral - -    20 Tixocortol-21- Pivalate - -    21 Diazolidiyl Urea (Germall II) - -    22 Methyl Methacrylate - -    23 Cobalt (II) Chloride Hexahydrate - -    24 Fragrance Mix II  - -    25 Compositae Mix - -    26 Benzoyl Peroxide - -    27 Bacitracin - -    28 Formaldehyde - -    29 Methylchloroisothiazolinone / Methylisothiazolinone - -    30 Corticosteroid Mix - -    31 Sodium Lauryl Sulfate - -    32 Lanolin Alcohol - -    33 Turpentine - -    34 Cetylstearylalcohol - -    35 Chlorhexidine Dicluconate - -    36 Budenoside - -    37 Imidazolidinyl Urea  - -    38 Ethyl-2 Cyanoacrylate - -    39 Quaternium 15 (Dowicil 200) - -    40 Decyl Glucoside - -      EMULSIFIERS & ADDITIVES        No Substance 2 days 4 days remarks   41 Polyethylene Glycol-400 - -    42 Cocamidopropyl Betaine - -    43 Amerchol L101 - -    44 Propylene Glycol - -    45 Triethanolamine - -    46 Sorbitane Sesquiolate - -    47 Isopropylmyristate - -    48 Polysorbate 80  - -    49 Amidoamine   (Stearamidopropyl Dimethylamine) - -    50 Oleamidopropyl Dimethylamine - -    51 Lauryl Glucoside - -     52 Coconut Diethanolamide  - -    53 2-Hydroxy-4-Methoxy Benzophenone (Oxybenzone) - -    54 Benzophenone-4 (Sulisobenzon) - -    55 Propolis - -    56 Dexpanthenol - -    57 Carboxymethyl Cellulose Sodium - -    58 Abitol - -    59 Tert-Butylhydroquinone - -    60 Benzyl Salicylate - -     Antioxidant      61 Dodecyl Gallate - -    62 Butylhydroxyanisole (BHA) - -    63 Butylhydroxytoluene (BHT) - -    64 Di-Alpha-Tocopherol (Vit E) - -    65 Propyl Gallate - -      PRESERVATIVES & ANTIMICROBIALS         No Substance 2 days 4 days remarks   66  1,2-Benzisothiazoline-3-One, Sodium Salt - -    67  1,3,5-Rafat (2-Hydroxyethyl) - Hexahydrotriazine (Grotan BK) - -    68 5-Chpvuivozzypb-5-Nitro-1, 3-Propanediol - -    69  3, 4, 4' - Triclocarban - -    70 4 - Chloro - 3 - Cresol - -    71 4 - Chloro - 4 - Xylenol (PCMX) - -    72 7-Ethylbicyclooxazolidine (Bioban [id number]) - -    73 Benzalkonium Chloride - -    74 Benzyl Alcohol - -    75 Cetalkonium Chloride - -    76 Cetylpyrimidine Chloride  - -    77 Chloroacetamide - -    78 DMDM Hydantoin - -    79 Glutaraldehyde - -    80 Triclosan - - N/A   81 Glyoxal Trimeric Dihydrate - -    82 Iodopropynyl Butylcarbamate - -    83 Octylisothiazoline - -    84 Iodoform - -    85 (Nitrobutyl) Morpholine/(Ethylnitro-Trimethylene) Dimorpholine (Bioban P 1487) - -    86 Phenoxyethanol - -    87 Phenyl Salicylate - -    88 Povidone Iodine - -    89 Sodium Benzoate - -    90 Sodium Disulfite - -    91 Sorbic Acid - -    92 Thimerosal - -     Parabens      93 Butyl-P-Hydroxybenzoate - -    94 Ethyl-P-Hydroxybenzoate - -    95 Methyl-P-Hydroxybenzoate - -    96 Propyl-P-Hydroxybenzoate - -      RUBBER CHEMICALS         No Substance 2 days 4 days remarks    Carbamate      97 Zinc Bis ( Diethyldithio carbamate ) (ZDEC) - -    98 Zinc Bis (Dibutyldithiocarbamate) - -    99 1,3-Diphenylguanidine (DPG) - -     Thiourea      100 Dibutylthiourea - -    101 Diphenyltiourea - -    102 Thiourea -  -     Mercapto chemicals      103 Morpholinyl Mercaptobenzothiazole - -    104 D-Rxujjfuyut-8-Benzothiazyl-Sulfenamide - -    105 Dibenzothiazyl Disulfide - -     Thiuram chemicals      106 Dipentamethylenethiuram Disulfide - -    107 Tetraethylthiuram Disulfide (Disulfiram) - -    108 Tetramethylthiuram Disulfide - -    109 Tetramethyl Thiuram Monosulfide (TMTM) - -     4-Phenylenediamine derivatives      110 N-Isopropyl-N'-Phenyl-P-Phenylenediamine (IPPD) - -    111 Trjyxgoc-M-Ipujrtfnfxnmfxkp (DPPD) - -     Various Rubber Additives      112 Hydroquinone Monobenzylether  - -    113 Hexamethylenetetramine - -    114 4,4'-Dihydroxybiphenyl - -    115 Cyclohexylthiophtalimide - -    116 Ethylenediamine Dihydrochloride - -    117 N-Phenyl-B-Naphthylamine - -    118 Dodecyl Mercaptan - -        PLASTICS         No Substance 2 days 4 days remarks    Acrylates - -    119 2-Hydroxyethyl Methacrylate (HEMA) - -    120 1,4-Butandioldimethacrylate (BUDMA) - -    121  2-Ethylhexyl Acrylate - -    122 Bisphenol-A-Dimethacrylate  - -    123 Diurethane-Dimethacrylate - -    124 Ethyleneglycoldimethacrylate (EGDMA) - -    125 Pentaerythritoltriacrylate (BEAR) - -    126 Triethylene Glycol Dimethacrylate (TEGDMA) - -     Synthetic material/additives       127 D-Caeh-Cgxrahlgjma - -    128 Tricresyl Phosphate - -    129 3-Qnzr-Yziqieptazvsq - -    130 Bis (2-Ethylhexyl) Phthalate - -    131 Dibutylphthalate - -    132 Dimethylphthalate - -    133 Toluene-2,4-Diisocyanate - -    134 Diphenylmethane-4,4''-Diisocyanate - -     EPOXY RESIN SYSTEMS       Reactive Solvents - -    135 Cresyl Glycidyl Ether - -    136 Butyl Glycidyl Ether - -    137 Phenyl Glycidyl Ether - -    138 1,4-Butanediol Diglycidyl Ether - -    139 1,6-Hexanediole Diglycidyl Ether - -     Hardener / Accelerator - -    140 Ethylenediamine Dihydrochloride - -    141 Triethylenetetramine - -    142 Diethylenetriamine - -    143 Isophorone Diamine (IPD) - -    144  N,N-Dimethyl-P-Toluidine - -      ANTIBIOTICS & ANTIMYCOTICS         No Substance 2 days 4 days remarks   145 Erythromycine - -    146 Framycetine Sulphate - -    147 Fusidic Acid Sodium Salt - -    148 Gentamicin Sulphate - -    149 Neomycine Sulphate - -    150 Oxytetracycline  - -    151 Polymyxin B Sulphate - -    152 Tetracycline-HCL - -    153 Sulfanilamide - -    154 Metronidazole - -    155 Oxyquinoline Mix - -    156 Nitrofurazone - -    157 Nystatin - -    158 Clotrimazole - -          Results of patch tests:                         Interpretation:    - Negative                    A    = Allergic      (+) Erythema    TI   = Toxic/irritant   + E + Infiltration    RaP = Relevance at Present     ++ E/I + Papulovesicle   Rpr  = Relevance Previously     +++ E/I/P + Blister     nR   = No Relevance    [] No relevant allergic reaction observed    [] Allergic reaction diagnosed against: see later      Interpretation/ remarks:   See later    [] Patient information given   [] [facility] information   [] [employer] information    ==> final Diagnosis:    >>> Subacute-chronic dyshidrotic hand eczema with differential diagnosis including toxic irritant based on atopic predisposition or allergic contact eczema; with overlying impetigo    ==> Treatment prescribed/Plan:      Atopic predisposition with sensitization to house dust mites    Continue patch testing in the next 1 week (9/16/19); advised that she cannot swim or sweat during the week that she is doing her patch testing. She was encouraged to use Lidex ointment daily until she undergoes patch testing.     Continue clindamycin lotion twice daily to bilateral hands     Prick testing at last visit, positive for house dust mites    9/6/19 Prescribed 10% iodine solution for the bilateral hands. She can dilute it 1:10 and soak her hands in it for 4-10 minutes.    Suave for shampoo and Ivory for body soap.     9/6/19 Prescribed gentian violet; we will prescribe her this to  put on prior her lidex ointment      These conclusions are made at the best of ones knowledge and belief  based on the provided evidence such as patients history and allergy test results and they can change over time or can be incomplete because of missing informations.      CC Anita Diego PA-C  [address] [place] [phone] on close of this encounter.  Follow-up in 2-3 weeks, earlier for new or changing lesions.     Staff Involved:    Scribe Disclosure  I, Pao Trinidad, am serving as a scribe to document services personally performed by Dr. Diallo Cortes, based on data collection and the provider's statements to me.     Start Time: 6:37 PM  End Time: 6:47 PM    I spent a total of 10 min face to face with the patient during today's office visit. About 50% of the time was spent counseling the patient and/or coordinating care regarding their allergy.

## 2019-09-16 NOTE — PROGRESS NOTES
Patient had 158 patch tests placed this visit.    Standard panel (40 tests)    Preservatives & Antimicrobials (31 tests)    Emulsifiers & Additives (25 tests)     Rubber Chemicals (22 tests)    Plastics (26 tests)    Antibiotics & Antimycotics (14 tests)

## 2019-09-18 ENCOUNTER — OFFICE VISIT (OUTPATIENT)
Dept: ALLERGY | Facility: CLINIC | Age: 39
End: 2019-09-18
Payer: COMMERCIAL

## 2019-09-18 DIAGNOSIS — L20.89 OTHER ATOPIC DERMATITIS: ICD-10-CM

## 2019-09-18 DIAGNOSIS — L23.89 ALLERGIC CONTACT DERMATITIS DUE TO OTHER AGENTS: Primary | ICD-10-CM

## 2019-09-18 ASSESSMENT — PAIN SCALES - GENERAL: PAINLEVEL: NO PAIN (0)

## 2019-09-18 NOTE — PROGRESS NOTES
[facility] Allergy Note    Allergy Clinic  [facility]  [address] [place] [id number]    Allergy Problem List:  1. Subacute-chronic dyshidrotic hand dermatitis with overlying impetigo  -Continue clindamycin lotion twice daily to bilateral hands  -Patch testing 9/16/19    Encounter Date: Sep 18, 2019    CC:  Chief Complaint   Patient presents with     Allergies     Pao is here today for patch testing day 3       History of Present Illness:  Ms. Pao Pereira is a 38 year old female who presents as a referral from Dr. Valentin for patch testing.      First began as a rash on the hands, elbows, and knees in 12/17. Notes it began as an irritation that on the right pinky finger that spread to diffusely cover both hands. She is right handed. Was treated with topical clobetasol and oral prednisone with resolution of rash, does not remember if it was helpful. Was intermittently using triple antibiotic ointment, Aveeno lotion, and Eucerin lotion. States rashes on elbows and knees developed in 12/2017 resolved 1 month after getting Mirena IUD removed in 4/2019 and have not recurred since. States hand dermatitis has not resolved since it occurred 2 years ago, will resolve for a day or two before worsening again. Notes daughter was diagnosed with a staph infection last week, was unsure if her daughter got it from her hands. Followed up with Adrianna Diego last week, was told she needed to pursue patch testing. States that her hands intermittently have a honey color with crusting with development of vesicles that burst. Notes hands are very itchy and painful. Has had blistering on her lips in the past. Has been using clindamycin lotion to her bilateral hands twice per day, has not noticed any obvious difference.    In the past she has had rashes when wearing certain jewelry and earrings as well as irritation due to lip products such as chapstick. No  "history of atopic dermatitis as a child. Has not been washing dishes, got a  in July. Does wear rubber or latex gloves intermittently, will cover her hands with lotion and wear the gloves to bed. Uses Aurea dish detergent, Soft Soap for hand wash, Ivory body wash, and Suave for shampoo. Was using free and clear body wash for a period of time, was not making any difference so she changed back to a more affordable body wash. Notes Aquaphor, Aveeno, CeraVe, and Eucerin seemed to make her hands more itchy and cause vesicle formation. Has a history of asthma, no history of seasonal allergies. Asthma is worse in the winter. Does not take any antihistamines.    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 Spends a lot of time at the [facility] in the pool with her children a few times per week. Spends a lot of time at playgrounds and doing activities with her kids. Her children like to paint with acrylic paints, she used to paint. She used to swim competitively as a child.     History since 08/20/19:  The patient comes in today (9/6/19) for followup. She is accompanied by her child son and child daughter.    She reports that on Thursday and Friday of last week, she had inflammation and severe pain of both hands. She feels that her symptoms are starting to radiate medially.  The patient states that she was to start back up the clindamycin this week, and she has been using this followed by placing her hands in blue gloves. She only does the blue gloves in the morning and doesn't do any other washes. She shares that she hadn't done her usual bleach bath this past week due to using clindamycin. She also started a prenatal recently and noticed that her eczema of the hands seemed to \"break out\"; she is unsure if there is a correlation.     She uses Suave shampoo to wash her hair. Discussed using Vanicream lotion, but the patient notes that this is accompanied by a burning sensation. For " the body she uses Ivory soap, and there is sometimes a burning sensation.     The patient shares that there is quite a bit of drying on the skin of both hands today. Overall, she does feel that the hands are improving. She notes that, via her mother's suggestion, she used metahoney, and the inflammation of the hands worsened.     Hx since 9/6/19:  Today the pt comes in for patch testing day 1 (9/16/19). She is accompanied by her mother. Pt reports that her hands have improved. However, there remains some pruritis.    She denies using tea tree oil. She shares that she uses aluminum free roll-on deodorant.      Past Medical History:   Patient Active Problem List   Diagnosis     Irritant contact dermatitis, unspecified trigger     Pruritus     Past Medical History:   Diagnosis Date     Encounter for insertion of mirena IUD 11/06/2017     Past Surgical History:   Procedure Laterality Date     BIOPSY      GYN       Social History:  Patient reports that she has never smoked. She has never used smokeless tobacco. She reports that she drinks about 0.6 oz of alcohol per week. She reports that she does not use drugs. 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 Spends a lot of time at the [facility] in the pool with her children a few times per week. Spends a lot of time at playgrounds and doing activities with her kids. Her children like to paint with acrylic paints, she used to paint. She used to swim competitively as a child.     Family History:  Family History   Problem Relation Age of Onset     Hypertension Father      Thyroid Cancer Maternal Grandmother      Myocardial Infarction Maternal Grandfather      Cerebral aneurysm Maternal Grandfather      Diabetes Maternal Grandfather      Kidney failure Maternal Grandfather         was on dialysis     Thyroid Cancer Maternal Aunt      Bone Cancer Maternal Uncle      Colon Cancer Paternal Aunt        Medications:  Current Outpatient  Medications   Medication Sig Dispense Refill     clindamycin (CLEOCIN T) 1 % external lotion Apply topically 2 times daily 60 mL 1     fluocinonide (LIDEX) 0.05 % ointment Twice daily to rash on the hands, elbows, knees for 4 weeks 240 g 1     gentian violet 1 % external solution Paint eczematous lesions on hands before adding the Lidex oint 59 mL 3     levonorgestrel (MIRENA) 20 MCG/24HR IUD 1 each by Intrauterine route once       No Known Allergies      Review of Systems:  -Const: Denies fevers, chills or changes in weight.   -Constitutional: Otherwise feeling well today, in usual state of health.  -HEENT: Patient denies nonhealing oral sores.  -Skin: As above in HPI. No additional skin concerns.    Physical exam:  Vitals: There were no vitals taken for this visit.  GEN: This is a well developed, well-nourished female in no acute distress, in a pleasant mood.    SKIN: Focused examination of the hands was performed.  Subacute to acute eczemas on both hands  -No other lesions of concern on areas examined.     Allergy tests:    Atopy Screen (Placed 08/20/19 )    No Substance Readings (15 min) Evaluation   POS Histamine 1mg/ml ++    NEG NaCl 0.9% -      No Substance Readings (15 min) Evaluation   1 Alternaria alternata (tenuis)  -    2 Cladosporium herbarum -    3 Aspergillus fumigatus -    4 Penicillium notatum -    5 Dermatophagoides pteronyssinus +++    6 Dermatophagoides farinae +++    7 Dog epithelium (canis spp) -    8 Cat hair (aida catus) -    9 Cockroach   (Blatella americana & germanica) -    10 Grass mix midwest   (Briana, Orchard, Redtop, Edwin) -    11 Arvind grass (sorghum halepense) -    12 Weed mix   (common Cocklebur, Lamb s quarters, rough redroot Pigweed, Dock/Sorrel) -    13 Mug wort (artemisia vulgare) -    14 Ragweed giant/short (ambrosia spp) -    15 English Plantain (plantago lanceolata) -    16 Tree mix 1 (Pecan, Maple BHR, Oak RVW, american Bock, black Ankeny) -    17 Red cedar  (juniperus virginia) -    18 Tree mix 2   (white Hay, river/red Birch, black [place]edarville, common Fairland, american Elm) -    19 Box elder/Maple mix (acer spp) +    20 Monroe shagbark (carya ovata) -       -      Conclusion: clear sensitization to house dust mites, which correlates with previous Asthma during the winter season, but since pregnancy this sensitization seems not to be clinically relevant. However, a sign for atopic predisposition    Order for PATCH TESTS    [x] Outpatient  [] Inpatient: [facility]..../ Bed ....      Skin Atopy (atopic dermatitis) [x] Yes   [] No ??  Rhinitis/Sinusitis:   [] Yes   [x] No  Allergic Asthma:   [x] Yes   [] No ?  Food Allergy:   [] Yes   [x] No  Leg ulcers:   [] Yes   [x] No  Hand eczema:   [x] Yes   [] No   Leading hand:   [x] R   [] L       [] Ambidextrous                        Reason for tests (suspected allergy): recurrent subacute-chronic dermatitis on palmae and elbows, knees and lips (lip balm)  Known previous allergies: fashion jewelery (metals) and maybe additives in creams or cosmetics (most of them irritate)    Standardized panels  [x] Standard panel (40 tests)  [x] Preservatives & Antimicrobials (31 tests)  [x] Emulsifiers & Additives (25 tests)   [] Perfumes/Flavours & Plants (25 tests)  [] Hairdresser panel (12 tests)  [x] Rubber Chemicals (22 tests)  [x] Plastics (26 tests)  [] Colorants/Dyes/Food additives (20 tests)  [] Metals (implants/dental) (24 tests)  [] Local anaesthetics/NSAIDs (13 tests)  [x] Antibiotics & Antimycotics (14 tests)   [] Corticosteroids (15 tests)   [] Photopatch test (62 tests)   [] others: ...      [] Patient's own products: ...    DO NOT test if chemical or biological identity is unknown!     always ask from patient the product information and safety sheets (MSDS)   RESULTS & EVALUATION of PATCH TESTS    Patch test readings after     [x] 2 days, [] 3 days [x] 4 days, [] 5 days,    Applied patch tests with results (import here the list of  patch tests):  Order documented by: Isa Corrales CMA  Order reviewed by: Sandie Massey LPN   Physician:    Date/time of application:09/16/2019  Localization of application: Back >>> no eczema of the back    STANDARD Series         No Substance 2 days 4 days remarks   1 Horacio Mix [C] - -    2 Colophony - -    3  2-Mercaptobenzothiazole  - -     4 Methylisothiazolinone - -    5 Carba Mix - -    6 Thiuram Mix [A] - -    7 Bisphenol A Epoxy Resin - -    8 D-Lpiv-Bdfkqvzrqrl-Formaldehyde Resin - -    9 Mercapto Mix [A] - -    10 Black Rubber Mix- PPD [B] - -    11 Potassium Dichromate  -  -    12 Balsam of [place] (Myroxylon Pereirae Resin) - -    13 Nickel Sulphate Hexahydrate - -    14 Mixed Dialkyl Thiourea - -    15 Paraben Mix [B] - -    16 Methyldibromo Glutaronitrile - -    17 Fragrance Mix - -    18 2-Bromo-2-Nitropropane-1,3-Diol (Bronopol) - -    19 Lyral - -    20 Tixocortol-21- Pivalate - -    21 Diazolidiyl Urea (Germall II) - -    22 Methyl Methacrylate - -    23 Cobalt (II) Chloride Hexahydrate - -    24 Fragrance Mix II  - -    25 Compositae Mix - -    26 Benzoyl Peroxide - -    27 Bacitracin - -    28 Formaldehyde - -    29 Methylchloroisothiazolinone / Methylisothiazolinone - -    30 Corticosteroid Mix - -    31 Sodium Lauryl Sulfate - -    32 Lanolin Alcohol - -    33 Turpentine - -    34 Cetylstearylalcohol - -    35 Chlorhexidine Dicluconate - -    36 Budenoside - -    37 Imidazolidinyl Urea  - -    38 Ethyl-2 Cyanoacrylate - -    39 Quaternium 15 (Dowicil 200) - -    40 Decyl Glucoside - -      EMULSIFIERS & ADDITIVES        No Substance 2 days 4 days remarks   41 Polyethylene Glycol-400 - -    42 Cocamidopropyl Betaine + -    43 Amerchol L101 - -    44 Propylene Glycol - -    45 Triethanolamine - -    46 Sorbitane Sesquiolate - -    47 Isopropylmyristate - -    48 Polysorbate 80  - -    49 Amidoamine   (Stearamidopropyl Dimethylamine) - -    50 Oleamidopropyl Dimethylamine - -    51  Lauryl Glucoside - -    52 Coconut Diethanolamide  - -    53 2-Hydroxy-4-Methoxy Benzophenone (Oxybenzone) - -    54 Benzophenone-4 (Sulisobenzon) - -    55 Propolis - -    56 Dexpanthenol - -    57 Carboxymethyl Cellulose Sodium - -    58 Abitol - -    59 Tert-Butylhydroquinone - -    60 Benzyl Salicylate - -     Antioxidant      61 Dodecyl Gallate - -    62 Butylhydroxyanisole (BHA) - -    63 Butylhydroxytoluene (BHT) - -    64 Di-Alpha-Tocopherol (Vit E) - -    65 Propyl Gallate - -      PRESERVATIVES & ANTIMICROBIALS         No Substance 2 days 4 days remarks   66  1,2-Benzisothiazoline-3-One, Sodium Salt - -    67  1,3,5-Rafat (2-Hydroxyethyl) - Hexahydrotriazine (Grotan BK) - -    68 7-Iuogvmxjlbxoa-2-Nitro-1, 3-Propanediol - -    69  3, 4, 4' - Triclocarban - -    70 4 - Chloro - 3 - Cresol - -    71 4 - Chloro - 4 - Xylenol (PCMX) - -    72 7-Ethylbicyclooxazolidine (BarEyean PP5279) - -    73 Benzalkonium Chloride - -    74 Benzyl Alcohol - -    75 Cetalkonium Chloride - -    76 Cetylpyrimidine Chloride  - -    77 Chloroacetamide - -    78 DMDM Hydantoin - -    79 Glutaraldehyde - -    80 Triclosan - - N/A   81 Glyoxal Trimeric Dihydrate - -    82 Iodopropynyl Butylcarbamate - -    83 Octylisothiazoline - -    84 Iodoform - -    85 (Nitrobutyl) Morpholine/(Ethylnitro-Trimethylene) Dimorpholine (Bioban P 1487) - -    86 Phenoxyethanol - -    87 Phenyl Salicylate - -    88 Povidone Iodine - -    89 Sodium Benzoate - -    90 Sodium Disulfite - -    91 Sorbic Acid - -    92 Thimerosal - -     Parabens      93 Butyl-P-Hydroxybenzoate - -    94 Ethyl-P-Hydroxybenzoate - -    95 Methyl-P-Hydroxybenzoate - -    96 Propyl-P-Hydroxybenzoate - -      RUBBER CHEMICALS         No Substance 2 days 4 days remarks    Carbamate      97 Zinc Bis ( Diethyldithio carbamate ) (ZDEC) - -    98 Zinc Bis (Dibutyldithiocarbamate) - -    99 1,3-Diphenylguanidine (DPG) - -     Thiourea      100 Dibutylthiourea - -    101 Diphenyltiourea  - -    102 Thiourea - -     Mercapto chemicals      103 Morpholinyl Mercaptobenzothiazole - -    104 R-Rfgjjwkrwz-4-Benzothiazyl-Sulfenamide - -    105 Dibenzothiazyl Disulfide - -     Thiuram chemicals      106 Dipentamethylenethiuram Disulfide - -    107 Tetraethylthiuram Disulfide (Disulfiram) - -    108 Tetramethylthiuram Disulfide - -    109 Tetramethyl Thiuram Monosulfide (TMTM) - -     4-Phenylenediamine derivatives      110 N-Isopropyl-N'-Phenyl-P-Phenylenediamine (IPPD) - -    111 Ejzaupvv-P-Mgzheryydrguddrb (DPPD) - -     Various Rubber Additives      112 Hydroquinone Monobenzylether  - -    113 Hexamethylenetetramine - -    114 4,4'-Dihydroxybiphenyl - -    115 Cyclohexylthiophtalimide - -    116 Ethylenediamine Dihydrochloride - -    117 N-Phenyl-B-Naphthylamine - -    118 Dodecyl Mercaptan - -        PLASTICS         No Substance 2 days 4 days remarks    Acrylates - -    119 2-Hydroxyethyl Methacrylate (HEMA) - -    120 1,4-Butandioldimethacrylate (BUDMA) - -    121  2-Ethylhexyl Acrylate - -    122 Bisphenol-A-Dimethacrylate  - -    123 Diurethane-Dimethacrylate - -    124 Ethyleneglycoldimethacrylate (EGDMA) - -    125 Pentaerythritoltriacrylate (BEAR) - -    126 Triethylene Glycol Dimethacrylate (TEGDMA) - -     Synthetic material/additives       127 W-Griz-Savpuipzare - -    128 Tricresyl Phosphate - -    129 8-Cdcr-Ljkkrewillzor - -    130 Bis (2-Ethylhexyl) Phthalate - -    131 Dibutylphthalate - -    132 Dimethylphthalate - -    133 Toluene-2,4-Diisocyanate - -    134 Diphenylmethane-4,4''-Diisocyanate - -     EPOXY RESIN SYSTEMS       Reactive Solvents - -    135 Cresyl Glycidyl Ether - -    136 Butyl Glycidyl Ether - -    137 Phenyl Glycidyl Ether - -    138 1,4-Butanediol Diglycidyl Ether - -    139 1,6-Hexanediole Diglycidyl Ether - -     Hardener / Accelerator - -    140 Ethylenediamine Dihydrochloride - -    141 Triethylenetetramine - -    142 Diethylenetriamine - -    143 Isophorone  Diamine (IPD) - -    144 N,N-Dimethyl-P-Toluidine - -      ANTIBIOTICS & ANTIMYCOTICS         No Substance 2 days 4 days remarks   145 Erythromycine - -    146 Framycetine Sulphate - -    147 Fusidic Acid Sodium Salt - -    148 Gentamicin Sulphate - -    149 Neomycine Sulphate - -    150 Oxytetracycline  - -    151 Polymyxin B Sulphate - -    152 Tetracycline-HCL - -    153 Sulfanilamide - -    154 Metronidazole - -    155 Oxyquinoline Mix - -    156 Nitrofurazone - -    157 Nystatin - -    158 Clotrimazole - -      Results of patch tests:                         Interpretation:    - Negative                    A    = Allergic      (+) Erythema    TI   = Toxic/irritant   + E + Infiltration    RaP = Relevance at Present     ++ E/I + Papulovesicle   Rpr  = Relevance Previously     +++ E/I/P + Blister     nR   = No Relevance    [x] No relevant allergic reaction observed    [] Allergic reaction diagnosed against: see later      Interpretation/ remarks:   See later    [] Patient information given   [] [facility] information   [] [employer] information    ==> final Diagnosis:    >>> Subacute-chronic dyshidrotic hand eczema with differential diagnosis including toxic irritant based on atopic predisposition or allergic contact eczema; with overlying impetigo    ==> Treatment prescribed/Plan:      Atopic predisposition with sensitization to house dust mites.     Continue clindamycin lotion twice daily to bilateral hands     Prick testing at last visit, positive for house dust mites    9/6/19 Prescribed 10% iodine solution for the bilateral hands. She can dilute it 1:10 and soak her hands in it for 4-10 minutes.    Suave for shampoo and Ivory for body soap.     9/6/19 Prescribed gentian violet; we will prescribe her this to put on prior her lidex ointment      These conclusions are made at the best of ones knowledge and belief  based on the provided evidence such as patients history and allergy test results and they can change  over time or can be incomplete because of missing informations.      CC Anita Diego PA-C  [address] [place] [address] on close of this encounter.  Follow-up in 2-3 weeks, earlier for new or changing lesions.     Staff Involved:    Scribe Disclosure:  IAshlie, am serving as a scribe to document services personally performed by Dr. Diallo Cortes MD, based on data collection and the provider's statements to me.

## 2019-09-20 ENCOUNTER — OFFICE VISIT (OUTPATIENT)
Dept: ALLERGY | Facility: CLINIC | Age: 39
End: 2019-09-20
Payer: COMMERCIAL

## 2019-09-20 DIAGNOSIS — L23.0 ALLERGIC CONTACT DERMATITIS DUE TO METALS: ICD-10-CM

## 2019-09-20 DIAGNOSIS — L20.89 OTHER ATOPIC DERMATITIS: Primary | ICD-10-CM

## 2019-09-20 RX ORDER — PREDNISONE 10 MG/1
TABLET ORAL
Qty: 20 TABLET | Refills: 0 | Status: SHIPPED | OUTPATIENT
Start: 2019-09-20 | End: 2019-11-25

## 2019-09-20 RX ORDER — MOMETASONE FUROATE 1 MG/G
OINTMENT TOPICAL
Qty: 45 G | Refills: 1 | Status: SHIPPED | OUTPATIENT
Start: 2019-09-23 | End: 2019-09-20

## 2019-09-20 RX ORDER — TACROLIMUS 1 MG/G
OINTMENT TOPICAL 2 TIMES DAILY
Qty: 60 G | Refills: 3 | Status: SHIPPED | OUTPATIENT
Start: 2019-09-20 | End: 2022-02-22

## 2019-09-20 RX ORDER — BETAMETHASONE DIPROPIONATE 0.05 %
OINTMENT (GRAM) TOPICAL
Qty: 50 G | Refills: 3 | Status: SHIPPED | OUTPATIENT
Start: 2019-09-23 | End: 2022-02-22

## 2019-09-20 RX ORDER — PREDNISONE 10 MG/1
TABLET ORAL
Qty: 20 TABLET | Refills: 0 | Status: SHIPPED | OUTPATIENT
Start: 2019-09-20 | End: 2019-09-20

## 2019-09-20 ASSESSMENT — PAIN SCALES - GENERAL: PAINLEVEL: NO PAIN (0)

## 2019-09-20 NOTE — LETTER
September 20, 2019      Pao Pereira  [address]  [address]  [facility] [address]              Dear MARTIN Diego,    Thank you for referring your patient, Pao Pereira, for allergy testing. This patient was seen on 9/16/19, 9/18/19, and 9/20/19 for allergy testing. The below compounds were tested. Please see below for my interpretation and a list of tests preformed.     Positive Reactions:  Nickel    Interpretation/Remarks  The allergy against nickel is clinically relevant because the patient indicates reactions to fashion jewelry. In addition, it clearly shows that the patient has an atopic predisposition where nickel sensitizations are very common. However, we could not see relevant contact allergies to other compounds like additives/preservatives. Therefore, we hypothesize based on the nickel sensitization and the positive prick test to house dust mite that the skin problems are primarily linked to an atopic dermatitis.    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  Clinically relevant sensitization to nickel   Clinically relevant immediate type reaction to house dust mites with recurrent asthma   No signs for allergic contact sensitization to other compounds    Treatment/Plan    Pt to go back to [facility] for this irritation.    We will give the pt oral prednisone to take in the morning at 50mg today, 40mg tomorrow, 30mg on day 3, 25mg on day 4, 20mg day 5, 15mg day 6, 10mg day 7, 5mg day 8, and then stop. The pt is to make an appt with [facility] in about 2 weeks. This is to help calm down her flare-up.    For emollients, pt has used aquaphor and serve. Also vanicream which led to a burning sensation. She is to try vanicream at the elbows 2-3x daily. If she has an irritation, we will try a different emollient.    Prescribed topical treatment protopic 0.1% ointment every  morning and evening M-F.  If this does not help, another option is narrow bed UV treatment of the hands. Pt informed that she would have to come in 2-3x per week for this. May also pursue dupixent for the pt.     For Saturdays and Sundays, the pt can use betamometasone ointment (her insurance does not accept mometasone)    9/6/19 Prescribed 10% iodine solution for the bilateral hands. She can dilute it 1:10 and soak her hands in it for 4-10 minutes. 9/6/19 Prescribed gentian violet; we will prescribe her this to put on prior her lidex ointment.     Suave for shampoo and Ivory for body soap.     Tested Products  (Standard) Horacio Mix [C], Colophony, 2-Mercaptobenzothiazole, Methylisothiazolinone, Carba Mix, Thiuram Mix [A], Bisphenol A Epoxy Resin, H-Awlj-Hdxzvysuptq Formaldehyde Resin, Mercapto Mix [A], Black Rubber Mix- PPD [B], Potassium Dichromate, Balsam of [place] (Myroxylon Pereirae Resin), Nickel Sulphate Hexahydrate, Mixed Dialkyl Thiourea, Paraben Mix [B], Methyldibromo Glutaro-Nitrile, Fragrance mix,  2-Bromo-2-nitropropane-1,3-diol (Bronopol), Lyral, Tixocortol-21-Pivalate, Diazolidiyl Urea (Germall II), Methyl Methacrylate, Cobalt (II) Chloride Hexahydrate, Fragrance Mix II, Compositae Mix, Benzoyl Peroxide, Bacitracin, Formaldehyde, Methylchloroisothiazolinone / Methylisothiazolinone, Corticoseteroid Mix, Sodium Lauryl Sulfate, Lanolin Alcohol, Oil of Turpentine, Cetylstearylalcohol, Chlorhexidine Dicluconate, Budenoside, Imidazolidinyl Urea, Ethyl Cyanoacrylate, Quaternium 15, Decyl Glucoside (Preservatives and Antimicrobial) 1,2-benzisothiazoline-3-one, Sodium Salt, 1,3,5-Rafat(2-Hydroxyethyl) -Hexahydrotriazine(Grotan BK), 2-Hydroxymethyl- 2-Nitro-1,3-Propanediol, 3,4,4'-Triclocarban, 4-Chloro-3- Cresol, 4-Chloro-3-5-Xylénol (PCMX), 7-Ethylbicyclooxazolidine ([facility] CS 1246), Benzalkonium Chloride, Benzyl Alcohol, Cetalkonium Chloride, Cetylpyrimidine Chloride, Chloroacetamide, DMDM Hydantoin,  Glutaraldehyde, Triclosan, Glyoxal Trimeric Dihydrate, Iodopropynyl Butylcarbamate, Octylisothiazolinone, Iodoform, (Nitrobutyl) Morpholine/ (Ethylnitrotrimethylene) dimorpholine(Biopan ), Phenoxyethanol, Phenyl Salicylate, Povidone Iodine, Sodium Benzoate, Sodium Disulfite, Sorbic Acid, Thimerosal, Butyl-P-Hydroxybenzoate, Ethyl-P-Hydroxybenzoate, Methyl-P-Hydroxybenzoate, Propyl-P-Hydroxybenzoate (Emulsifiers) Polyethylene Glycol-400, Cocamidopropyl Betaine, Amerchol L101, Propylene Glycol, Triethanolamine, Sorbitane Sesquiolate, Isopropylmyristate, Polysorbate 80, Amidoamine (Stearamidopropyl Dimethylamine), Oleamidopropyl Dimethylamine, Lauryl Glucoside, Coconut Diethanolamide, 2-Hydroxy-4-Methoxybenzo-Phenone (Oxybenzone), Benzophenone-4 (Sulisobenzon), Propolis, Dexpanthenol, Carboxymethyl Cellulose Sodium, Abitol, Tert- Butylhydroquinone, Benzyl Salicylate, Dodecyl Gallate, Butylhydroxyanisole (BHA), Butylhydroxytoluene (BHT), Di-Alpha-Tocopherol, Propyl Gallate ([employer]) Zinc Bis (Diethyldithiocarbamate) (ZDEC), Zinc Bis (Dibutyldithiocarbamate), 1,3-Diphenylguanidine (DPG), Dibutylthiourea, Diphenyltiourea, Thiourea, Morpholinyl Mercaptobenzothiazole, F-Hfojfhhzlj-0-Benzothiazyl-Sulfenamide, Dibenzothiazyl Disulfide, Dipentamethylenethiuram Disulfide, Tetraethylthiuram Disulfide (Disulfiram), Tetramethylthiuram Disulfide, Tetramethyl Thiuram Monosulfide (TMTM), N-Isopropyl-N'-Phenyl-P-Phenylenediamine (IPPD), Diphenyl- P- Phenylenediamine (DPPD), Hydroquinone Monobenzylether, Hexamethylenetetramine, 4,4'-Dihydroxybiphenyl, Cyclohexylthiophtalimide, Ethylenediamine Dihydrochloride, N-Phenyl-B-Naphthylamine, Dodecyl Mercaptan (Plastic) 2-Hydroxyethyl Methacrylate (HEMA), 1,4-butandioldimethacrylate (BUDMA), 2-Ethylhexyl Acrylate, Bisphenol-A-Dimethacrylate, Diurethane - Dimethacrylate, Ethyleneglycoldimethacrylate (EGDMA), Pentaerythritoltriacrylate (BEAR), Triethyleneglycol Dimethacrylate  (TEGDMA), L-Obgt-Iplvrdikfdc, Tricresyl Phosphate, 9-Imsr-Ssrtygttqjrzr, Bis (2-Ethylhexyl) Phthalate, Dibutylphthalate, Dimethylphthalate, Toluene-2,4-Diisocyanate, Diphenylmethane-4,4''-Diisocyanate, Cresyl Glycidyl Ether, Butyl Glycidyl Ether, Phenyl Glycidyl Ether, 1,4-Butanediol Diglycidyl Ether, 1,6-Hexanediole Diglycidyl Ether, Ethylenediamine Dihydrochloride, Triethylenetetramine, Diethylenetriamine, Isophorone Diamine (IPD), N,N-Dimethyl-P-Toluidine (Antibiotics and Antimycotics) Erythromycine, Framycetine Sulphate, Fusidic Acid Sodium Salt, Gentamicin Sulphate, Neomycine Sulphate, Oxytetracycline, Polymyxin B Sulphate, Tetracycline-HCL, Sulfanilamide, Metronidazole, Oxyquinoline Mix, Nitrofurazone, Nystatin, Clotrimazole      These conclusions are made at the best of ones knowledge and belief  based on the provided evidence such as patients history and allergy test results and they can change over time or can be incomplete because of missing informations.       If you have any questions please call [phone]      Sincerely,    Diallo Cortes MD

## 2019-09-20 NOTE — PROGRESS NOTES
[facility] Allergy Note    Allergy Clinic  [facility]  [address] [place] [id number]    Allergy Problem List:  1. Subacute-chronic dyshidrotic hand dermatitis with overlying impetigo  -Continue clindamycin lotion twice daily to bilateral hands  -Patch testing 9/16/19    Encounter Date: Sep 20, 2019    CC:  Chief Complaint   Patient presents with     Allergy Testing Followup     Patch test - day #5       History of Present Illness:  Ms. Pao Pereira is a 38 year old female who presents as a referral from Dr. Valentin for patch testing.      First began as a rash on the hands, elbows, and knees in 12/17. Notes it began as an irritation that on the right pinky finger that spread to diffusely cover both hands. She is right handed. Was treated with topical clobetasol and oral prednisone with resolution of rash, does not remember if it was helpful. Was intermittently using triple antibiotic ointment, Aveeno lotion, and Eucerin lotion. States rashes on elbows and knees developed in 12/2017 resolved 1 month after getting Mirena IUD removed in 4/2019 and have not recurred since. States hand dermatitis has not resolved since it occurred 2 years ago, will resolve for a day or two before worsening again. Notes daughter was diagnosed with a staph infection last week, was unsure if her daughter got it from her hands. Followed up with Adrianna Diego last week, was told she needed to pursue patch testing. States that her hands intermittently have a honey color with crusting with development of vesicles that burst. Notes hands are very itchy and painful. Has had blistering on her lips in the past. Has been using clindamycin lotion to her bilateral hands twice per day, has not noticed any obvious difference.    In the past she has had rashes when wearing certain jewelry and earrings as well as irritation due to lip products such as chapstick. No history of  "atopic dermatitis as a child. Has not been washing dishes, got a  in July. Does wear rubber or latex gloves intermittently, will cover her hands with lotion and wear the gloves to bed. Uses Aurea dish detergent, Soft Soap for hand wash, Ivory body wash, and Suave for shampoo. Was using free and clear body wash for a period of time, was not making any difference so she changed back to a more affordable body wash. Notes Aquaphor, Aveeno, CeraVe, and Eucerin seemed to make her hands more itchy and cause vesicle formation. Has a history of asthma, no history of seasonal allergies. Asthma is worse in the winter. Does not take any antihistamines.    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 Spends a lot of time at the [facility] in the pool with her children a few times per week. Spends a lot of time at playgrounds and doing activities with her kids. Her children like to paint with acrylic paints, she used to paint. She used to swim competitively as a child.     History since 08/20/19:  The patient comes in today (9/6/19) for followup. She is accompanied by her child son and child daughter.    She reports that on Thursday and Friday of last week, she had inflammation and severe pain of both hands. She feels that her symptoms are starting to radiate medially.  The patient states that she was to start back up the clindamycin this week, and she has been using this followed by placing her hands in blue gloves. She only does the blue gloves in the morning and doesn't do any other washes. She shares that she hadn't done her usual bleach bath this past week due to using clindamycin. She also started a prenatal recently and noticed that her eczema of the hands seemed to \"break out\"; she is unsure if there is a correlation.     She uses Suave shampoo to wash her hair. Discussed using Vanicream lotion, but the patient notes that this is accompanied by a burning sensation. For the body " she uses Ivory soap, and there is sometimes a burning sensation.     The patient shares that there is quite a bit of drying on the skin of both hands today. Overall, she does feel that the hands are improving. She notes that, via her mother's suggestion, she used metahoney, and the inflammation of the hands worsened.     Hx since 9/6/19:  Today the pt comes in for patch testing day 1 (9/16/19). She is accompanied by her mother. Pt reports that her hands have improved. However, there remains some pruritis.    She denies using tea tree oil. She shares that she uses aluminum free roll-on deodorant.    Hx since 9/16/19:  For emollients, pt has used aquaphor and serve. Also vanicream which led to burning sensation. She has not tried protopic. Pt reports that the triamcinolone was not helpful.      Past Medical History:   Patient Active Problem List   Diagnosis     Irritant contact dermatitis, unspecified trigger     Pruritus     Past Medical History:   Diagnosis Date     Encounter for insertion of mirena IUD 11/06/2017     Past Surgical History:   Procedure Laterality Date     BIOPSY      GYN       Social History:  Patient reports that she has never smoked. She has never used smokeless tobacco. She reports that she drinks about 0.6 oz of alcohol per week. She reports that she does not use drugs. 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 Spends a lot of time at the [facility] in the pool with her children a few times per week. Spends a lot of time at playgrounds and doing activities with her kids. Her children like to paint with acrylic paints, she used to paint. She used to swim competitively as a child.     Family History:  Family History   Problem Relation Age of Onset     Hypertension Father      Thyroid Cancer Maternal Grandmother      Myocardial Infarction Maternal Grandfather      Cerebral aneurysm Maternal Grandfather      Diabetes Maternal Grandfather      Kidney failure  Maternal Grandfather         was on dialysis     Thyroid Cancer Maternal Aunt      Bone Cancer Maternal Uncle      Colon Cancer Paternal Aunt        Medications:  Current Outpatient Medications   Medication Sig Dispense Refill     clindamycin (CLEOCIN T) 1 % external lotion Apply topically 2 times daily 60 mL 1     fluocinonide (LIDEX) 0.05 % ointment Twice daily to rash on the hands, elbows, knees for 4 weeks 240 g 1     gentian violet 1 % external solution Paint eczematous lesions on hands before adding the Lidex oint 59 mL 3     levonorgestrel (MIRENA) 20 MCG/24HR IUD 1 each by Intrauterine route once       No Known Allergies      Review of Systems:  -Const: Denies fevers, chills or changes in weight.   -Constitutional: Otherwise feeling well today, in usual state of health.  -HEENT: Patient denies nonhealing oral sores.  -Skin: As above in HPI. No additional skin concerns.    Physical exam:  Vitals: There were no vitals taken for this visit.  GEN: This is a well developed, well-nourished female in no acute distress, in a pleasant mood.    SKIN: Focused examination of the hands was performed.  Subacute to acute eczemas on both hands  -No other lesions of concern on areas examined.     Allergy tests:    Atopy Screen (Placed 08/20/19 )    No Substance Readings (15 min) Evaluation   POS Histamine 1mg/ml ++    NEG NaCl 0.9% -      No Substance Readings (15 min) Evaluation   1 Alternaria alternata (tenuis)  -    2 Cladosporium herbarum -    3 Aspergillus fumigatus -    4 Penicillium notatum -    5 Dermatophagoides pteronyssinus +++    6 Dermatophagoides farinae +++    7 Dog epithelium (canis spp) -    8 Cat hair (aida catus) -    9 Cockroach   (Blatella americana & germanica) -    10 Grass mix midwest   (Briana, Orchard, Redtop, Edwin) -    11 Arvind grass (sorghum halepense) -    12 Weed mix   (common Cocklebur, Lamb s quarters, rough redroot Pigweed, Dock/Sorrel) -    13 Mug wort (artemisia vulgare) -    14  Ragweed giant/short (ambrosia spp) -    15 English Plantain (plantago lanceolata) -    16 Tree mix 1 (Pecan, Maple BHR, Oak RVW, american [place], black [place]) -    17 Red cedar (juniperus virginia) -    18 Tree mix 2   (white Hay, river/red Birch, black [place], common Harris, american Elm) -    19 Box elder/Maple mix (acer spp) +    20 [place] shagbark (carya ovata) -       -      Conclusion: clear sensitization to house dust mites, which correlates with previous Asthma during the winter season, but since pregnancy this sensitization seems not to be clinically relevant. However, a sign for atopic predisposition    Order for PATCH TESTS    [x] Outpatient  [] Inpatient: [facility]..../ Bed ....      Skin Atopy (atopic dermatitis) [x] Yes   [] No ??  Rhinitis/Sinusitis:   [] Yes   [x] No  Allergic Asthma:   [x] Yes   [] No ?  Food Allergy:   [] Yes   [x] No  Leg ulcers:   [] Yes   [x] No  Hand eczema:   [x] Yes   [] No   Leading hand:   [x] R   [] L       [] Ambidextrous                        Reason for tests (suspected allergy): recurrent subacute-chronic dermatitis on palmae and elbows, knees and lips (lip balm)  Known previous allergies: fashion jewelery (metals) and maybe additives in creams or cosmetics (most of them irritate)    Standardized panels  [x] Standard panel (40 tests)  [x] Preservatives & Antimicrobials (31 tests)  [x] Emulsifiers & Additives (25 tests)   [] Perfumes/Flavours & Plants (25 tests)  [] Hairdresser panel (12 tests)  [x] Rubber Chemicals (22 tests)  [x] Plastics (26 tests)  [] Colorants/Dyes/Food additives (20 tests)  [] Metals (implants/dental) (24 tests)  [] Local anaesthetics/NSAIDs (13 tests)  [x] Antibiotics & Antimycotics (14 tests)   [] Corticosteroids (15 tests)   [] Photopatch test (62 tests)   [] others: ...      [] Patient's own products: ...    DO NOT test if chemical or biological identity is unknown!     always ask from patient the product information and safety sheets  (MSDS)   RESULTS & EVALUATION of PATCH TESTS    Patch test readings after     [x] 2 days, [] 3 days [x] 4 days, [] 5 days,    Applied patch tests with results (import here the list of patch tests):  Order documented by: Isa Corrales CMA  Order reviewed by: Sandie Massey LPN   Physician:    Date/time of application:09/16/2019  Localization of application: Back >>> no eczema of the back    STANDARD Series         No Substance 2 days 4 days remarks   1 Horacio Mix [C] - -    2 Colophony - -    3  2-Mercaptobenzothiazole  - -     4 Methylisothiazolinone - -    5 Carba Mix - -    6 Thiuram Mix [A] - -    7 Bisphenol A Epoxy Resin - -    8 A-Fhfq-Srykfyiziog-Formaldehyde Resin - -    9 Mercapto Mix [A] - -    10 Black Rubber Mix- PPD [B] - -    11 Potassium Dichromate  -  -    12 Balsam of [place] (Myroxylon Pereirae Resin) - -    13 Nickel Sulphate Hexahydrate - ++    14 Mixed Dialkyl Thiourea - -    15 Paraben Mix [B] - -    16 Methyldibromo Glutaronitrile - -    17 Fragrance Mix - -    18 2-Bromo-2-Nitropropane-1,3-Diol (Bronopol) - -    19 Lyral - -    20 Tixocortol-21- Pivalate - -    21 Diazolidiyl Urea (Germall II) - -    22 Methyl Methacrylate - -    23 Cobalt (II) Chloride Hexahydrate - -    24 Fragrance Mix II  - -    25 Compositae Mix - -    26 Benzoyl Peroxide - -    27 Bacitracin - -    28 Formaldehyde - -    29 Methylchloroisothiazolinone / Methylisothiazolinone - -    30 Corticosteroid Mix - -    31 Sodium Lauryl Sulfate - -    32 Lanolin Alcohol - -    33 Turpentine - -    34 Cetylstearylalcohol - -    35 Chlorhexidine Dicluconate - -    36 Budenoside - -    37 Imidazolidinyl Urea  - -    38 Ethyl-2 Cyanoacrylate - -    39 Quaternium 15 (Dowicil 200) - -    40 Decyl Glucoside - -      EMULSIFIERS & ADDITIVES        No Substance 2 days 4 days remarks   41 Polyethylene Glycol-400 - -    42 Cocamidopropyl Betaine - -    43 Amerchol L101 - -    44 Propylene Glycol - -    45 Triethanolamine - -     46 Sorbitane Sesquiolate - -    47 Isopropylmyristate - -    48 Polysorbate 80  - -    49 Amidoamine   (Stearamidopropyl Dimethylamine) - -    50 Oleamidopropyl Dimethylamine - -    51 Lauryl Glucoside - -    52 Coconut Diethanolamide  - -    53 2-Hydroxy-4-Methoxy Benzophenone (Oxybenzone) - -    54 Benzophenone-4 (Sulisobenzon) - -    55 Propolis - -    56 Dexpanthenol - -    57 Carboxymethyl Cellulose Sodium - -    58 Abitol - -    59 Tert-Butylhydroquinone - -    60 Benzyl Salicylate - -     Antioxidant      61 Dodecyl Gallate - -    62 Butylhydroxyanisole (BHA) - -    63 Butylhydroxytoluene (BHT) - -    64 Di-Alpha-Tocopherol (Vit E) - -    65 Propyl Gallate - -      PRESERVATIVES & ANTIMICROBIALS         No Substance 2 days 4 days remarks   66  1,2-Benzisothiazoline-3-One, Sodium Salt - -    67  1,3,5-Arfat (2-Hydroxyethyl) - Hexahydrotriazine (Grotan BK) - -    68 5-Uclsdahvbhxoj-5-Nitro-1, 3-Propanediol - -    69  3, 4, 4' - Triclocarban - -    70 4 - Chloro - 3 - Cresol - -    71 4 - Chloro - 4 - Xylenol (PCMX) - -    72 7-Ethylbicyclooxazolidine (Steelwedge Softwarean CQ9516) - -    73 Benzalkonium Chloride - -    74 Benzyl Alcohol - -    75 Cetalkonium Chloride - -    76 Cetylpyrimidine Chloride  - -    77 Chloroacetamide - -    78 DMDM Hydantoin - -    79 Glutaraldehyde - -    80 Triclosan - - N/A   81 Glyoxal Trimeric Dihydrate - -    82 Iodopropynyl Butylcarbamate - -    83 Octylisothiazoline - -    84 Iodoform - -    85 (Nitrobutyl) Morpholine/(Ethylnitro-Trimethylene) Dimorpholine (Bioban P 1487) - -    86 Phenoxyethanol - -    87 Phenyl Salicylate - -    88 Povidone Iodine - -    89 Sodium Benzoate - -    90 Sodium Disulfite - -    91 Sorbic Acid - -    92 Thimerosal - -     Parabens      93 Butyl-P-Hydroxybenzoate - -    94 Ethyl-P-Hydroxybenzoate - -    95 Methyl-P-Hydroxybenzoate - -    96 Propyl-P-Hydroxybenzoate - -      RUBBER CHEMICALS         No Substance 2 days 4 days remarks    Carbamate      97 Zinc  Bis ( Diethyldithio carbamate ) (ZDEC) - -    98 Zinc Bis (Dibutyldithiocarbamate) - -    99 1,3-Diphenylguanidine (DPG) - -     Thiourea      100 Dibutylthiourea - -    101 Diphenyltiourea - -    102 Thiourea - -     Mercapto chemicals      103 Morpholinyl Mercaptobenzothiazole - -    104 E-Segnbzzbno-5-Benzothiazyl-Sulfenamide - -    105 Dibenzothiazyl Disulfide - -     Thiuram chemicals      106 Dipentamethylenethiuram Disulfide - -    107 Tetraethylthiuram Disulfide (Disulfiram) - -    108 Tetramethylthiuram Disulfide - -    109 Tetramethyl Thiuram Monosulfide (TMTM) - -     4-Phenylenediamine derivatives      110 N-Isopropyl-N'-Phenyl-P-Phenylenediamine (IPPD) - -    111 Tzcunggt-J-Asmofdyopituvflc (DPPD) - -     Various Rubber Additives      112 Hydroquinone Monobenzylether  - -    113 Hexamethylenetetramine - -    114 4,4'-Dihydroxybiphenyl - -    115 Cyclohexylthiophtalimide - -    116 Ethylenediamine Dihydrochloride - -    117 N-Phenyl-B-Naphthylamine - -    118 Dodecyl Mercaptan - -        PLASTICS         No Substance 2 days 4 days remarks    Acrylates - -    119 2-Hydroxyethyl Methacrylate (HEMA) - -    120 1,4-Butandioldimethacrylate (BUDMA) - -    121  2-Ethylhexyl Acrylate - -    122 Bisphenol-A-Dimethacrylate  - -    123 Diurethane-Dimethacrylate - -    124 Ethyleneglycoldimethacrylate (EGDMA) - -    125 Pentaerythritoltriacrylate (BEAR) - -    126 Triethylene Glycol Dimethacrylate (TEGDMA) - -     Synthetic material/additives       127 J-Jblb-Rymwzvixtwq - -    128 Tricresyl Phosphate - -    129 0-Ciph-Itfxbjobvitmc - -    130 Bis (2-Ethylhexyl) Phthalate - -    131 Dibutylphthalate - -    132 Dimethylphthalate - -    133 Toluene-2,4-Diisocyanate - -    134 Diphenylmethane-4,4''-Diisocyanate - -     EPOXY RESIN SYSTEMS       Reactive Solvents - -    135 Cresyl Glycidyl Ether - -    136 Butyl Glycidyl Ether - -    137 Phenyl Glycidyl Ether - -    138 1,4-Butanediol Diglycidyl Ether - -    139  1,6-Hexanediole Diglycidyl Ether - -     Hardener / Accelerator - -    140 Ethylenediamine Dihydrochloride - -    141 Triethylenetetramine - -    142 Diethylenetriamine - -    143 Isophorone Diamine (IPD) - -    144 N,N-Dimethyl-P-Toluidine - -      ANTIBIOTICS & ANTIMYCOTICS         No Substance 2 days 4 days remarks   145 Erythromycine - -    146 Framycetine Sulphate - -    147 Fusidic Acid Sodium Salt - -    148 Gentamicin Sulphate - -    149 Neomycine Sulphate - -    150 Oxytetracycline  - -    151 Polymyxin B Sulphate - -    152 Tetracycline-HCL - -    153 Sulfanilamide - -    154 Metronidazole - -    155 Oxyquinoline Mix - -    156 Nitrofurazone - -    157 Nystatin - -    158 Clotrimazole - -          Results of patch tests:                         Interpretation:    - Negative                    A    = Allergic      (+) Erythema    TI   = Toxic/irritant   + E + Infiltration    RaP = Relevance at Present     ++ E/I + Papulovesicle   Rpr  = Relevance Previously     +++ E/I/P + Blister     nR   = No Relevance    [x] Allergic reaction diagnosed against: Nickel       Interpretation/ remarks:   The allergy against nickel is clinically relevant because the patient indicates reactions to fashion jewelry. In addition, it clearly shows that the patient has an atopic predisposition where nickel sensitizations are very common. However, we could not see relevant contact allergies to other compounds like additives/preservatives. Therefore, we hypothesize based on the nickel sensitization and the positive prick test to house dust mite that the skin problems are primarily linked to an atopic dermatitis.     [x] Patient information given   [] ACDS information   [x] SmartPractice information    ==> final Diagnosis:     >>> Subacute-chronic dyshidrotic hand eczema on hands and eczemas on extremities based on atopic predisposition (atopic dermatitis) and clear toxic irritant component.      Clinically relevant sensitization to  nickel     Clinically relevant immediate type reaction to house dust mites with recurrent asthma     No signs for allergic contact sensitization to other compounds    ==> Treatment prescribed/Plan:    Pt to go back to [facility] Diego PA-C for this irritation.    We will give the pt oral prednisone to take in the morning at 50mg today, 40mg tomorrow, 30mg on day 3, 25mg on day 4, 20mg day 5, 15mg day 6, 10mg day 7, 5mg day 8, and then stop. The pt is to make an appt with Anita in about 2 weeks. This is to help calm down her flare-up.    For emollients, pt has used aquaphor and serve. Also vanicream which led to a burning sensation. She is to try vanicream at the elbows 2-3x daily. If she has an irritation, we will try a different emollient.    Prescribed topical treatment protopic 0.1% ointment every morning and evening M-F.  If this does not help, another option is narrow bed UV treatment of the hands. Pt informed that she would have to come in 2-3x per week for this. May also pursue dupixent for the pt.     For Saturdays and Sundays, the pt can use betamometasone ointment (her insurance does not accept mometasone)    9/6/19 Prescribed 10% iodine solution for the bilateral hands. She can dilute it 1:10 and soak her hands in it for 4-10 minutes. 9/6/19 Prescribed gentian violet; we will prescribe her this to put on prior her lidex ointment.     Suave for shampoo and Ivory for body soap.       These conclusions are made at the best of ones knowledge and belief  based on the provided evidence such as patients history and allergy test results and they can change over time or can be incomplete because of missing informations.      CC Anita Diego PA-C  [address] [place] [address] on close of this encounter.      Staff Involved:    Scribe Disclosure  I, Pao Trinidad, am serving as a scribe to document services personally performed by Dr. Diallo Cortes, based on data collection and the provider's  statements to me.     Start Time: 7:10 A.M.  End Time: 7:45 AM    >>> Some time at today's appointment was spent interpreting and discussing with the patient her allergy testing results.    I spent a total of 35 min face to face with the patient during today's office visit. About 50% of the time was spent counseling the patient and/or coordinating care regarding their allergy.

## 2019-09-20 NOTE — PATIENT INSTRUCTIONS
Allergy tests:    Atopy Screen (Placed 08/20/19 )    No Substance Readings (15 min) Evaluation   POS Histamine 1mg/ml ++    NEG NaCl 0.9% -      No Substance Readings (15 min) Evaluation   1 Alternaria alternata (tenuis)  -    2 Cladosporium herbarum -    3 Aspergillus fumigatus -    4 Penicillium notatum -    5 Dermatophagoides pteronyssinus +++    6 Dermatophagoides farinae +++    7 Dog epithelium (canis spp) -    8 Cat hair (aida catus) -    9 Cockroach   (Blatella americana & germanica) -    10 Grass mix midwest   (Briana, Orchard, Redtop, Edwin) -    11 Arvind grass (sorghum halepense) -    12 Weed mix   (common Cocklebur, Lamb s quarters, rough redroot Pigweed, Dock/Sorrel) -    13 Mug wort (artemisia vulgare) -    14 Ragweed giant/short (ambrosia spp) -    15 English Plantain (plantago lanceolata) -    16 Tree mix 1 (Pecan, Maple BHR, Oak RVW, american [place], black [place]) -    17 Red cedar (juniperus virginia) -    18 Tree mix 2   (white Hay, river/red Birch, black [place], common [place], american Elm) -    19 Box elder/Maple mix (acer spp) +    20 [place] shagbark (carya ovata) -       -      Conclusion: clear sensitization to house dust mites, which correlates with previous Asthma during the winter season, but since pregnancy this sensitization seems not to be clinically relevant. However, a sign for atopic predisposition    Order for PATCH TESTS    [x] Outpatient  [] Inpatient: [facility]..../ Bed ....      Skin Atopy (atopic dermatitis) [x] Yes   [] No ??  Rhinitis/Sinusitis:   [] Yes   [x] No  Allergic Asthma:   [x] Yes   [] No ?  Food Allergy:   [] Yes   [x] No  Leg ulcers:   [] Yes   [x] No  Hand eczema:   [x] Yes   [] No   Leading hand:   [x] R   [] L       [] Ambidextrous                        Reason for tests (suspected allergy): recurrent subacute-chronic dermatitis on palmae and elbows, knees and lips (lip balm)  Known previous allergies: fashion jewelery (metals) and maybe additives in creams  or cosmetics (most of them irritate)    Standardized panels  [x] Standard panel (40 tests)  [x] Preservatives & Antimicrobials (31 tests)  [x] Emulsifiers & Additives (25 tests)   [] Perfumes/Flavours & Plants (25 tests)  [] Hairdresser panel (12 tests)  [x] Rubber Chemicals (22 tests)  [x] Plastics (26 tests)  [] Colorants/Dyes/Food additives (20 tests)  [] Metals (implants/dental) (24 tests)  [] Local anaesthetics/NSAIDs (13 tests)  [x] Antibiotics & Antimycotics (14 tests)   [] Corticosteroids (15 tests)   [] Photopatch test (62 tests)   [] others: ...      [] Patient's own products: ...    DO NOT test if chemical or biological identity is unknown!     always ask from patient the product information and safety sheets (MSDS)   RESULTS & EVALUATION of PATCH TESTS    Patch test readings after     [x] 2 days, [] 3 days [x] 4 days, [] 5 days,    Applied patch tests with results (import here the list of patch tests):  Order documented by: Isa Corrales CMA  Order reviewed by: Sandie Massey LPN   Physician:    Date/time of application:09/16/2019  Localization of application: Back >>> no eczema of the back    STANDARD Series         No Substance 2 days 4 days remarks   1 Horacio Mix [C] - -    2 Colophony - -    3  2-Mercaptobenzothiazole  - -     4 Methylisothiazolinone - -    5 Carba Mix - -    6 Thiuram Mix [A] - -    7 Bisphenol A Epoxy Resin - -    8 X-Kjpf-Yszqznbwvvt-Formaldehyde Resin - -    9 Mercapto Mix [A] - -    10 Black Rubber Mix- PPD [B] - -    11 Potassium Dichromate  -  -    12 Balsam of [place] (Myroxylon Pereirae Resin) - -    13 Nickel Sulphate Hexahydrate - ++    14 Mixed Dialkyl Thiourea - -    15 Paraben Mix [B] - -    16 Methyldibromo Glutaronitrile - -    17 Fragrance Mix - -    18 2-Bromo-2-Nitropropane-1,3-Diol (Bronopol) - -    19 Lyral - -    20 Tixocortol-21- Pivalate - -    21 Diazolidiyl Urea (Germall II) - -    22 Methyl Methacrylate - -    23 Cobalt (II) Chloride Hexahydrate  - -    24 Fragrance Mix II  - -    25 Compositae Mix - -    26 Benzoyl Peroxide - -    27 Bacitracin - -    28 Formaldehyde - -    29 Methylchloroisothiazolinone / Methylisothiazolinone - -    30 Corticosteroid Mix - -    31 Sodium Lauryl Sulfate - -    32 Lanolin Alcohol - -    33 Turpentine - -    34 Cetylstearylalcohol - -    35 Chlorhexidine Dicluconate - -    36 Budenoside - -    37 Imidazolidinyl Urea  - -    38 Ethyl-2 Cyanoacrylate - -    39 Quaternium 15 (Dowicil 200) - -    40 Decyl Glucoside - -      EMULSIFIERS & ADDITIVES        No Substance 2 days 4 days remarks   41 Polyethylene Glycol-400 - -    42 Cocamidopropyl Betaine - -    43 Amerchol L101 - -    44 Propylene Glycol - -    45 Triethanolamine - -    46 Sorbitane Sesquiolate - -    47 Isopropylmyristate - -    48 Polysorbate 80  - -    49 Amidoamine   (Stearamidopropyl Dimethylamine) - -    50 Oleamidopropyl Dimethylamine - -    51 Lauryl Glucoside - -    52 Coconut Diethanolamide  - -    53 2-Hydroxy-4-Methoxy Benzophenone (Oxybenzone) - -    54 Benzophenone-4 (Sulisobenzon) - -    55 Propolis - -    56 Dexpanthenol - -    57 Carboxymethyl Cellulose Sodium - -    58 Abitol - -    59 Tert-Butylhydroquinone - -    60 Benzyl Salicylate - -     Antioxidant      61 Dodecyl Gallate - -    62 Butylhydroxyanisole (BHA) - -    63 Butylhydroxytoluene (BHT) - -    64 Di-Alpha-Tocopherol (Vit E) - -    65 Propyl Gallate - -      PRESERVATIVES & ANTIMICROBIALS         No Substance 2 days 4 days remarks   66  1,2-Benzisothiazoline-3-One, Sodium Salt - -    67  1,3,5-Rafat (2-Hydroxyethyl) - Hexahydrotriazine (Grotan BK) - -    68 2-Aunbezrzfmijz-9-Nitro-1, 3-Propanediol - -    69  3, 4, 4' - Triclocarban - -    70 4 - Chloro - 3 - Cresol - -    71 4 - Chloro - 4 - Xylenol (PCMX) - -    72 7-Ethylbicyclooxazolidine (Bioban QN9306) - -    73 Benzalkonium Chloride - -    74 Benzyl Alcohol - -    75 Cetalkonium Chloride - -    76 Cetylpyrimidine Chloride  - -    77  Chloroacetamide - -    78 DMDM Hydantoin - -    79 Glutaraldehyde - -    80 Triclosan - - N/A   81 Glyoxal Trimeric Dihydrate - -    82 Iodopropynyl Butylcarbamate - -    83 Octylisothiazoline - -    84 Iodoform - -    85 (Nitrobutyl) Morpholine/(Ethylnitro-Trimethylene) Dimorpholine (Bioban P 1487) - -    86 Phenoxyethanol - -    87 Phenyl Salicylate - -    88 Povidone Iodine - -    89 Sodium Benzoate - -    90 Sodium Disulfite - -    91 Sorbic Acid - -    92 Thimerosal - -     Parabens      93 Butyl-P-Hydroxybenzoate - -    94 Ethyl-P-Hydroxybenzoate - -    95 Methyl-P-Hydroxybenzoate - -    96 Propyl-P-Hydroxybenzoate - -      RUBBER CHEMICALS         No Substance 2 days 4 days remarks    Carbamate      97 Zinc Bis ( Diethyldithio carbamate ) (ZDEC) - -    98 Zinc Bis (Dibutyldithiocarbamate) - -    99 1,3-Diphenylguanidine (DPG) - -     Thiourea      100 Dibutylthiourea - -    101 Diphenyltiourea - -    102 Thiourea - -     Mercapto chemicals      103 Morpholinyl Mercaptobenzothiazole - -    104 T-Veokytxxfq-3-Benzothiazyl-Sulfenamide - -    105 Dibenzothiazyl Disulfide - -     Thiuram chemicals      106 Dipentamethylenethiuram Disulfide - -    107 Tetraethylthiuram Disulfide (Disulfiram) - -    108 Tetramethylthiuram Disulfide - -    109 Tetramethyl Thiuram Monosulfide (TMTM) - -     4-Phenylenediamine derivatives      110 N-Isopropyl-N'-Phenyl-P-Phenylenediamine (IPPD) - -    111 Hgtqbimi-M-Hwwhjylmvfygbpmz (DPPD) - -     Various Rubber Additives      112 Hydroquinone Monobenzylether  - -    113 Hexamethylenetetramine - -    114 4,4'-Dihydroxybiphenyl - -    115 Cyclohexylthiophtalimide - -    116 Ethylenediamine Dihydrochloride - -    117 N-Phenyl-B-Naphthylamine - -    118 Dodecyl Mercaptan - -        PLASTICS         No Substance 2 days 4 days remarks    Acrylates - -    119 2-Hydroxyethyl Methacrylate (HEMA) - -    120 1,4-Butandioldimethacrylate (BUDMA) - -    121  2-Ethylhexyl Acrylate - -    122  Bisphenol-A-Dimethacrylate  - -    123 Diurethane-Dimethacrylate - -    124 Ethyleneglycoldimethacrylate (EGDMA) - -    125 Pentaerythritoltriacrylate (BEAR) - -    126 Triethylene Glycol Dimethacrylate (TEGDMA) - -     Synthetic material/additives       127 W-Deph-Gfppeywxbwz - -    128 Tricresyl Phosphate - -    129 0-Wzrv-Acounhouowfun - -    130 Bis (2-Ethylhexyl) Phthalate - -    131 Dibutylphthalate - -    132 Dimethylphthalate - -    133 Toluene-2,4-Diisocyanate - -    134 Diphenylmethane-4,4''-Diisocyanate - -     EPOXY RESIN SYSTEMS       Reactive Solvents - -    135 Cresyl Glycidyl Ether - -    136 Butyl Glycidyl Ether - -    137 Phenyl Glycidyl Ether - -    138 1,4-Butanediol Diglycidyl Ether - -    139 1,6-Hexanediole Diglycidyl Ether - -     Hardener / Accelerator - -    140 Ethylenediamine Dihydrochloride - -    141 Triethylenetetramine - -    142 Diethylenetriamine - -    143 Isophorone Diamine (IPD) - -    144 N,N-Dimethyl-P-Toluidine - -      ANTIBIOTICS & ANTIMYCOTICS         No Substance 2 days 4 days remarks   145 Erythromycine - -    146 Framycetine Sulphate - -    147 Fusidic Acid Sodium Salt - -    148 Gentamicin Sulphate - -    149 Neomycine Sulphate - -    150 Oxytetracycline  - -    151 Polymyxin B Sulphate - -    152 Tetracycline-HCL - -    153 Sulfanilamide - -    154 Metronidazole - -    155 Oxyquinoline Mix - -    156 Nitrofurazone - -    157 Nystatin - -    158 Clotrimazole - -          Results of patch tests:                         Interpretation:    - Negative                    A    = Allergic      (+) Erythema    TI   = Toxic/irritant   + E + Infiltration    RaP = Relevance at Present     ++ E/I + Papulovesicle   Rpr  = Relevance Previously     +++ E/I/P + Blister     nR   = No Relevance    [x] Allergic reaction diagnosed against: Nickel       Interpretation/ remarks:   The allergy against nickel is clinically relevant because the patient indicates reactions to fashion jewelry. In  addition, it clearly shows taht the patient has an atopic predisposition where nickel sensitizations are very common. However, we could not see relevant contact allergies to other compounds like additives/preservatives. Therefore, we hypothesize based on the nickel sensitization and the positive prick test to house dust mite that the skin problems are primarily linked to an atopic dermatitis.     [x] Patient information given   [] ACDS information   [x] SmartPractice information    ==> final Diagnosis:     >>> Subacute-chronic dyshidrotic hand eczema on hands and eczemas on extremities based on atopic predisposition (atopic dermatitis) and clear toxic irritant component.  Clinically relevant sensitization to nickel   Clinically relevant immediate type reaction to house dust mites with recurrent asthma   No signs for allergic contact sensitization to other compounds    ==> Treatment prescribed/Plan:    Pt to go back to Anita Diego PA-C for this irritation.    We will give the pt oral prednisone to take in the morning at 50mg today, 40mg tomorrow, 30mg on day 3, 25mg on day 4, 20mg day 5, 15mg day 6, 10mg day 7, 5mg day 8, and then stop. The pt is to make an appt with Anita in about 2 weeks. This is to help calm down her flare-up.    For emollients, pt has used aquaphor and serve. Also vanicream which led to burning sensation. She is to try vanicream at the elbows 2-3x daily. If she has an irritation, we will try a different emollient.    Prescribed topical treatment protopic 0.1% ointment every morning and evening M-F.  If this does not help, another option is narrow bed UV treatment of the hands. Pt informed that she would have to come in 2-3x per week for this. May also pursue dupixent for the pt.     For Saturdays and Sundays, the pt can use mometasone ointment.     9/6/19 Prescribed 10% iodine solution for the bilateral hands. She can dilute it 1:10 and soak her hands in it for 4-10 minutes. 9/6/19 Prescribed  gentian violet; we will prescribe her this to put on prior her lidex ointment.     Suave for shampoo and Ivory for body soap.       These conclusions are made at the best of ones knowledge and belief  based on the provided evidence such as patients history and allergy test results and they can change over time or can be incomplete because of missing informations.      CC Anita Diego PA-C  [address] [place] [address] on close of this encounter.

## 2019-10-16 ENCOUNTER — IMMUNIZATION (OUTPATIENT)
Dept: NURSING | Facility: CLINIC | Age: 39
End: 2019-10-16
Payer: COMMERCIAL

## 2019-10-16 PROCEDURE — 90686 IIV4 VACC NO PRSV 0.5 ML IM: CPT

## 2019-10-16 PROCEDURE — 90471 IMMUNIZATION ADMIN: CPT

## 2019-10-17 ENCOUNTER — OFFICE VISIT (OUTPATIENT)
Dept: DERMATOLOGY | Facility: CLINIC | Age: 39
End: 2019-10-17
Payer: COMMERCIAL

## 2019-10-17 DIAGNOSIS — L30.1 DYSHIDROTIC HAND DERMATITIS: Primary | ICD-10-CM

## 2019-10-17 ASSESSMENT — PAIN SCALES - GENERAL: PAINLEVEL: NO PAIN (0)

## 2019-10-17 NOTE — LETTER
10/17/2019       RE: Pao Pereira  [address]  [address]  [facility] [address]     Dear Colleague,    Thank you for referring your patient, Pao Pereira, to the [facility] DERMATOLOGY at [facility]. Please see a copy of my visit note below.    [facility] Dermatology Note      Dermatology Problem List:  1. Subacute-chronic dyshidrotic hand eczema on hands and eczemas on extremities based on atopic predisposition (atopic dermatitis) and clear toxic irritant component.  -Current tx: protopic 0.1% Monday-Friday, betamethasone 0.05% Saturday and Sunday, vanicream prn, 10% iodine solution for the bilateral hands prior to application of betamethasone  -hx nickel and dust mite allergy  2. Past history of lip dermatitis  3. Past history of sensitivity to metal jewelry     Encounter Date: Oct 17, 2019    CC:  Chief Complaint   Patient presents with     Derm Problem     Pao is here for patch test follow up, states she's seeing Dr. Cortes tomorrow for further testing.          History of Present Illness:  Ms. Pao Pereira is a 38 year old female who presents as a follow-up for hand dermatitis. The patient was last seen on 08/09/2019 when a bacterial swab of the right hand was obtained for testing, fluocinonide 0.05% ointment BID was continued, dilute bleach baths 2-3x weekly were recommended and a referral to Dr. Cortes for patch testing was placed for Hand dermatitis.The skin culture obtained from the patient was found to contain light growth of staphylococcus aureus. After allergy testing, Dr. Cortes felt that her hand dermatitis is primarily linked to an atopic dermatitis due to nickel sensitization and positive prick test to house dust mite. Subsequently, she did a prednisone taper for 8 days and experienced a notable improvement, but her hands have became worse again now about 7 days since stopping the oral prednisone. However overall they are better  "now than they have been in the past.     Today she is wondering about a potential correlation to progesterone.  She has noticed a correlation between her rash flaring up the week before she gets her period for the last 3 months. Therefore, she is curious whether progesterone may be a factor in her condition. She notes that she had an IUD placed in November of 2017 and developed dermatitis of the hands in the same month. She is currently using prototic Monday-Friday and Betamethasone Saturdays and Sundays. She notes that her hands are currently in \"good\" condition, as opposed to when the rash flares up and her hands are painful. The patient inquires about the safety of medications if she is trying to get pregnant. Hx of endometriosis. The patient denies painful, itching, tingling or bleeding lesions unless otherwise noted.    Past Medical History:   Patient Active Problem List   Diagnosis     Irritant contact dermatitis, unspecified trigger     Pruritus     Past Medical History:   Diagnosis Date     Encounter for insertion of mirena IUD 11/06/2017     Past Surgical History:   Procedure Laterality Date     BIOPSY      GYN       Social History:   reports that she has never smoked. She has never used smokeless tobacco. She reports current alcohol use of about 1.0 standard drinks of alcohol per week. She reports that she does not use drugs.    Family History:  Family History   Problem Relation Age of Onset     Hypertension Father      Thyroid Cancer Maternal Grandmother      Myocardial Infarction Maternal Grandfather      Cerebral aneurysm Maternal Grandfather      Diabetes Maternal Grandfather      Kidney failure Maternal Grandfather         was on dialysis     Thyroid Cancer Maternal Aunt      Bone Cancer Maternal Uncle      Colon Cancer Paternal Aunt        Medications:  Current Outpatient Medications   Medication Sig Dispense Refill     betamethasone dipropionate (DIPROSONE) 0.05 % external ointment Apply topically " twice a week On Saturday and Sunday 50 g 3     clindamycin (CLEOCIN T) 1 % external lotion Apply topically 2 times daily 60 mL 1     fluocinonide (LIDEX) 0.05 % ointment Twice daily to rash on the hands, elbows, knees for 4 weeks 240 g 1     gentian violet 1 % external solution Paint eczematous lesions on hands before adding the Lidex oint 59 mL 3     tacrolimus (PROTOPIC) 0.1 % external ointment Apply topically 2 times daily From Monday to Friday 2 times daily on hands and other eczema lesions 60 g 3     levonorgestrel (MIRENA) 20 MCG/24HR IUD 1 each by Intrauterine route once         Allergies   Allergen Reactions     Dust Mites      Nickel        Review of Systems:  -Constitutional: The patient denies fatigue, fevers, chills, unintended weight loss, and night sweats.  -HEENT: Patient denies nonhealing oral sores.  -Skin: As above in HPI. No additional skin concerns.    Physical exam:  Vitals: There were no vitals taken for this visit.  GEN: This is a well developed, well-nourished female in no acute distress, in a pleasant mood.    SKIN: Focused examination of the face and bilateral hands was performed.  -Bilateral palms with erythema, mild scaling, no fissures or cracks; overall improved since the last visit on 08/09/2019  -No other lesions of concern on areas examined.       Impression/Plan:  1. Subacute-chronic dyshidrotic hand eczema on hands and eczemas on extremities based on atopic predisposition (atopic dermatitis) and clear toxic irritant component.    Patient is somewhat distressed about her hand condition and is frustrated that there is no clear explaination    Continue Protopic 0.1% external ointment BID Monday through Friday    Continue Betamethasone 0.05% external ointment on Saturdays and Sundays    Continue vanicream to the elbows 2-3x daily prn    Continue 10% iodine solution for the bilateral hands prior to application of betamethasone    Topical Protopic and betamethasone reassured safe in the  case of pregnancy    Recommended patient avoid allergens when possible    Will consider UV light treatment if topical medications are not managing the condition well enough, could potentially consider Dupixent as well - but patient is considering pregnancy right now so may try and hold off on this    Patient to follow up with Dr. Cortes about possible progesterone involvement       Greater than 30min was spent with the patient    CC Dr. Taylor & Dr. Cortes on close of this encounter.  Follow-up prn for new or changing lesions.       Staff Involved:  Staff/Scribe    Scribe Disclosure:  I, Kingsley Landrum, am serving as a scribe to document services personally performed by Anita Diego PA-C, based on data collection and the provider's statements to me.     Provider Disclosure:   The documentation recorded by the scribe accurately reflects the services I personally performed and the decisions made by me.    All risks, benefits and alternatives were discussed with patient.  Patient is in agreement and understands the assessment and plan.  All questions were answered.    Anita Diego PA-C  [facility]: Phone: [phone], Fax: [phone]

## 2019-10-17 NOTE — PROGRESS NOTES
[facility] Dermatology Note      Dermatology Problem List:  1. Subacute-chronic dyshidrotic hand eczema on hands and eczemas on extremities based on atopic predisposition (atopic dermatitis) and clear toxic irritant component.  -Current tx: protopic 0.1% Monday-Friday, betamethasone 0.05% Saturday and Sunday, vanicream prn, 10% iodine solution for the bilateral hands prior to application of betamethasone  -hx nickel and dust mite allergy  2. Past history of lip dermatitis  3. Past history of sensitivity to metal jewelry     Encounter Date: Oct 17, 2019    CC:  Chief Complaint   Patient presents with     Derm Problem     Pao is here for patch test follow up, states she's seeing Dr. Cortes tomorrow for further testing.          History of Present Illness:  Ms. Pao Pereira is a 38 year old female who presents as a follow-up for hand dermatitis. The patient was last seen on 08/09/2019 when a bacterial swab of the right hand was obtained for testing, fluocinonide 0.05% ointment BID was continued, dilute bleach baths 2-3x weekly were recommended and a referral to Dr. Cortes for patch testing was placed for Hand dermatitis.The skin culture obtained from the patient was found to contain light growth of staphylococcus aureus. After allergy testing, Dr. Cortes felt that her hand dermatitis is primarily linked to an atopic dermatitis due to nickel sensitization and positive prick test to house dust mite. Subsequently, she did a prednisone taper for 8 days and experienced a notable improvement, but her hands have became worse again now about 7 days since stopping the oral prednisone. However overall they are better now than they have been in the past.     Today she is wondering about a potential correlation to progesterone.  She has noticed a correlation between her rash flaring up the week before she gets her period for the last 3 months. Therefore, she is curious whether progesterone may be a  "factor in her condition. She notes that she had an IUD placed in November of 2017 and developed dermatitis of the hands in the same month. She is currently using prototic Monday-Friday and Betamethasone Saturdays and Sundays. She notes that her hands are currently in \"good\" condition, as opposed to when the rash flares up and her hands are painful. The patient inquires about the safety of medications if she is trying to get pregnant. Hx of endometriosis. The patient denies painful, itching, tingling or bleeding lesions unless otherwise noted.    Past Medical History:   Patient Active Problem List   Diagnosis     Irritant contact dermatitis, unspecified trigger     Pruritus     Past Medical History:   Diagnosis Date     Encounter for insertion of mirena IUD 11/06/2017     Past Surgical History:   Procedure Laterality Date     BIOPSY      GYN       Social History:   reports that she has never smoked. She has never used smokeless tobacco. She reports current alcohol use of about 1.0 standard drinks of alcohol per week. She reports that she does not use drugs.    Family History:  Family History   Problem Relation Age of Onset     Hypertension Father      Thyroid Cancer Maternal Grandmother      Myocardial Infarction Maternal Grandfather      Cerebral aneurysm Maternal Grandfather      Diabetes Maternal Grandfather      Kidney failure Maternal Grandfather         was on dialysis     Thyroid Cancer Maternal Aunt      Bone Cancer Maternal Uncle      Colon Cancer Paternal Aunt        Medications:  Current Outpatient Medications   Medication Sig Dispense Refill     betamethasone dipropionate (DIPROSONE) 0.05 % external ointment Apply topically twice a week On Saturday and Sunday 50 g 3     clindamycin (CLEOCIN T) 1 % external lotion Apply topically 2 times daily 60 mL 1     fluocinonide (LIDEX) 0.05 % ointment Twice daily to rash on the hands, elbows, knees for 4 weeks 240 g 1     gentian violet 1 % external solution Paint " eczematous lesions on hands before adding the Lidex oint 59 mL 3     tacrolimus (PROTOPIC) 0.1 % external ointment Apply topically 2 times daily From Monday to Friday 2 times daily on hands and other eczema lesions 60 g 3     levonorgestrel (MIRENA) 20 MCG/24HR IUD 1 each by Intrauterine route once         Allergies   Allergen Reactions     Dust Mites      Nickel        Review of Systems:  -Constitutional: The patient denies fatigue, fevers, chills, unintended weight loss, and night sweats.  -HEENT: Patient denies nonhealing oral sores.  -Skin: As above in HPI. No additional skin concerns.    Physical exam:  Vitals: There were no vitals taken for this visit.  GEN: This is a well developed, well-nourished female in no acute distress, in a pleasant mood.    SKIN: Focused examination of the face and bilateral hands was performed.  -Bilateral palms with erythema, mild scaling, no fissures or cracks; overall improved since the last visit on 08/09/2019  -No other lesions of concern on areas examined.       Impression/Plan:  1. Subacute-chronic dyshidrotic hand eczema on hands and eczemas on extremities based on atopic predisposition (atopic dermatitis) and clear toxic irritant component.    Patient is somewhat distressed about her hand condition and is frustrated that there is no clear explaination    Continue Protopic 0.1% external ointment BID Monday through Friday    Continue Betamethasone 0.05% external ointment on Saturdays and Sundays    Continue vanicream to the elbows 2-3x daily prn    Continue 10% iodine solution for the bilateral hands prior to application of betamethasone    Topical Protopic and betamethasone reassured safe in the case of pregnancy    Recommended patient avoid allergens when possible    Will consider UV light treatment if topical medications are not managing the condition well enough, could potentially consider Dupixent as well - but patient is considering pregnancy right now so may try and  hold off on this    Patient to follow up with Dr. Cortes about possible progesterone involvement       Greater than 30min was spent with the patient    CC Dr. Taylor & Dr. Cortes on close of this encounter.  Follow-up prn for new or changing lesions.       Staff Involved:  Staff/Scribe    Scribe Disclosure:  I, Kingsley Landrum, am serving as a scribe to document services personally performed by Anita Diego PA-C, based on data collection and the provider's statements to me.     Provider Disclosure:   The documentation recorded by the scribe accurately reflects the services I personally performed and the decisions made by me.    All risks, benefits and alternatives were discussed with patient.  Patient is in agreement and understands the assessment and plan.  All questions were answered.    Anita Diego PA-C  [facility]: Phone: [phone], Fax: [phone]

## 2019-10-17 NOTE — NURSING NOTE
Dermatology Rooming Note    Pao Pereira's goals for this visit include:   Chief Complaint   Patient presents with     Derm Problem     Pao is here for patch test follow up, states she's seeing Dr. Cortes tomorrow for further testing.        Velvet Khan LPN

## 2019-10-18 ENCOUNTER — OFFICE VISIT (OUTPATIENT)
Dept: ALLERGY | Facility: CLINIC | Age: 39
End: 2019-10-18
Payer: COMMERCIAL

## 2019-10-18 ENCOUNTER — TELEPHONE (OUTPATIENT)
Dept: ALLERGY | Facility: CLINIC | Age: 39
End: 2019-10-18

## 2019-10-18 DIAGNOSIS — L30.9 HAND DERMATITIS: Primary | ICD-10-CM

## 2019-10-18 DIAGNOSIS — L23.89 ALLERGIC CONTACT DERMATITIS DUE TO OTHER AGENTS: ICD-10-CM

## 2019-10-18 DIAGNOSIS — L20.89 OTHER ATOPIC DERMATITIS: ICD-10-CM

## 2019-10-18 ASSESSMENT — PAIN SCALES - GENERAL: PAINLEVEL: MILD PAIN (3)

## 2019-10-18 NOTE — NURSING NOTE
Chief Complaint   Patient presents with     Derm Problem     Pao is here today because she has cocerns of Progesteron Dermatitis. Every 6-8 days before her 2 week menstrual period her skin on her hands start cracking. Patches on her elbows and knees dissappeared after removing IUD.      ANDREAS DYE on 10/18/2019 at 7:28 AM

## 2019-10-18 NOTE — PROGRESS NOTES
[facility] Allergy Note    Allergy Clinic  [facility]  [address] [place] [id number]    Allergy Problem List:  1. Subacute-chronic dyshidrotic hand eczema on hands and eczemas on extremities based on atopic predisposition (atopic dermatitis) and clear toxic irritant component.  -Current tx: protopic 0.1% Monday-Friday, betamethasone 0.05% Saturday and Sunday, vanicream to elbows, 10% iodine solution for the bilateral hands  -Previous tx: prednisone taper 10/18/19  2. Past history of lip dermatitis  3. Past history of sensitivity to metal jewelry     Encounter Date: Oct 18, 2019    CC:  Chief Complaint   Patient presents with     Derm Problem     Pao is here today because she has cocerns of Progesteron Dermatitis. Every 6-8 days before her 2 week menstrual period her skin on her hands start cracking. Patches on her elbows and knees dissappeared after removing IUD.        History of Present Illness:  Ms. Pao Pereira is a 38 year old female who presents as a referral from Dr. Valentin for patch testing.      First began as a rash on the hands, elbows, and knees in 12/17. Notes it began as an irritation that on the right pinky finger that spread to diffusely cover both hands. She is right handed. Was treated with topical clobetasol and oral prednisone with resolution of rash, does not remember if it was helpful. Was intermittently using triple antibiotic ointment, Aveeno lotion, and Eucerin lotion. States rashes on elbows and knees developed in 12/2017 resolved 1 month after getting Mirena IUD removed in 4/2019 and have not recurred since. States hand dermatitis has not resolved since it occurred 2 years ago, will resolve for a day or two before worsening again. Notes daughter was diagnosed with a staph infection last week, was unsure if her daughter got it from her hands. Followed up with Adrianna Diego last week, was told she needed to  pursue patch testing. States that her hands intermittently have a honey color with crusting with development of vesicles that burst. Notes hands are very itchy and painful. Has had blistering on her lips in the past. Has been using clindamycin lotion to her bilateral hands twice per day, has not noticed any obvious difference.    In the past she has had rashes when wearing certain jewelry and earrings as well as irritation due to lip products such as chapstick. No history of atopic dermatitis as a child. Has not been washing dishes, got a  in July. Does wear rubber or latex gloves intermittently, will cover her hands with lotion and wear the gloves to bed. Uses Aurea dish detergent, Soft Soap for hand wash, Ivory body wash, and Suave for shampoo. Was using free and clear body wash for a period of time, was not making any difference so she changed back to a more affordable body wash. Notes Aquaphor, Aveeno, CeraVe, and Eucerin seemed to make her hands more itchy and cause vesicle formation. Has a history of asthma, no history of seasonal allergies. Asthma is worse in the winter. Does not take any antihistamines.    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 Spends a lot of time at the [facility] in the pool with her children a few times per week. Spends a lot of time at playgrounds and doing activities with her kids. Her children like to paint with acrylic paints, she used to paint. She used to swim competitively as a child.     History since 08/20/19:  The patient comes in today (9/6/19) for followup. She is accompanied by her child son and child daughter.    She reports that on Thursday and Friday of last week, she had inflammation and severe pain of both hands. She feels that her symptoms are starting to radiate medially.  The patient states that she was to start back up the clindamycin this week, and she has been using this followed by placing her hands in blue gloves.  "She only does the blue gloves in the morning and doesn't do any other washes. She shares that she hadn't done her usual bleach bath this past week due to using clindamycin. She also started a prenatal recently and noticed that her eczema of the hands seemed to \"break out\"; she is unsure if there is a correlation.     She uses Suave shampoo to wash her hair. Discussed using Vanicream lotion, but the patient notes that this is accompanied by a burning sensation. For the body she uses Ivory soap, and there is sometimes a burning sensation.     The patient shares that there is quite a bit of drying on the skin of both hands today. Overall, she does feel that the hands are improving. She notes that, via her mother's suggestion, she used metahoney, and the inflammation of the hands worsened.     Hx since 9/6/19:  Today the pt comes in for patch testing day 1 (9/16/19). She is accompanied by her mother. Pt reports that her hands have improved. However, there remains some pruritis.    She denies using tea tree oil. She shares that she uses aluminum free roll-on deodorant.    Hx since 9/16/19:  For emollients, pt has used aquaphor and serve. Also vanicream which led to burning sensation. She has not tried protopic. Pt reports that the triamcinolone was not helpful.    Hx since 9/20/19:  The pt comes in today, 10/18/19, with concerns about progesterone dermatitis. She continues having flare-ups at the hands that are concurrent with her menstrual cycles. She shares that she has been using betamethasone and the protopic, and she uses regular soap like ivory soaps or suave, or dish soaps. 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 When washing the hands, she will put them in large blue rubber gloves to wash.    She asks about the progesterone today. Reports that her last cycle lasted 16 days, and she noticed 8 days prior, her " "hands were itchy and started breaking out. Reports that the cycle prior she was on prednisone and was fine. Prior that, reports she was at a wedding on the first day of her cycle and had a flare-up.    She feels that her menstrual cycle may be linked to the break-outs.    Denies taking any medications during that time. She notes that she had an IUD (mirena) taken out in April. Prior the removal of IUD, she had \"skin patches\" on elbows, knees, and ankles. A month after removal, patches resolved.    Admits to asthma in childhood.    She saw Anita Diego PA-C yesterday and was considering UV treatment or dupixent. Reports having had 2 children, and the hand eczema started after her 2nd child. She would like to try for a 3rd but is unsure if it would exacerbate the eczema.      Past Medical History:   Patient Active Problem List   Diagnosis     Irritant contact dermatitis, unspecified trigger     Pruritus     Past Medical History:   Diagnosis Date     Encounter for insertion of mirena IUD 11/06/2017     Past Surgical History:   Procedure Laterality Date     BIOPSY      GYN       Social History:  Patient reports that she has never smoked. She has never used smokeless tobacco. She reports current alcohol use of about 1.0 standard drinks of alcohol per week. She reports that she does not use drugs. 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 Spends a lot of time at the [facility] in the pool with her children a few times per week. Spends a lot of time at playgrounds and doing activities with her kids. Her children like to paint with acrylic paints, she used to paint. She used to swim competitively as a child.     Family History:  Family History   Problem Relation Age of Onset     Hypertension Father      Thyroid Cancer Maternal Grandmother      Myocardial Infarction Maternal Grandfather      Cerebral aneurysm Maternal Grandfather      Diabetes Maternal Grandfather      Kidney failure " Maternal Grandfather         was on dialysis     Thyroid Cancer Maternal Aunt      Bone Cancer Maternal Uncle      Colon Cancer Paternal Aunt        Medications:  Current Outpatient Medications   Medication Sig Dispense Refill     betamethasone dipropionate (DIPROSONE) 0.05 % external ointment Apply topically twice a week On Saturday and Sunday 50 g 3     gentian violet 1 % external solution Paint eczematous lesions on hands before adding the Lidex oint 59 mL 3     tacrolimus (PROTOPIC) 0.1 % external ointment Apply topically 2 times daily From Monday to Friday 2 times daily on hands and other eczema lesions 60 g 3     clindamycin (CLEOCIN T) 1 % external lotion Apply topically 2 times daily (Patient not taking: Reported on 10/18/2019) 60 mL 1     fluocinonide (LIDEX) 0.05 % ointment Twice daily to rash on the hands, elbows, knees for 4 weeks (Patient not taking: Reported on 10/18/2019) 240 g 1     levonorgestrel (MIRENA) 20 MCG/24HR IUD 1 each by Intrauterine route once       Allergies   Allergen Reactions     Dust Mites      Nickel          Review of Systems:  As per HPI.  -Const: Denies fevers, chills or changes in weight.   -Constitutional: Otherwise feeling well today, in usual state of health.  -Skin: As above in HPI. No additional skin concerns.    Physical exam:  Vitals: There were no vitals taken for this visit.  GEN: This is a well developed, well-nourished female in no acute distress, in a pleasant mood.    SKIN: Focused examination of the hands bilaterally was performed.  -There are thin scaly plaques involving the palms and fingers and dorsum of fingers bilaterally, with pinpoint papules and vesicles on the dorsum of the fingers  -Follicular-based papules on the skin colored upper extremities bilaterally  -No other lesions of concern on areas examined.     Allergy tests:    Atopy Screen (Placed 08/20/19 )    No Substance Readings (15 min) Evaluation   POS Histamine 1mg/ml ++    NEG NaCl 0.9% -      No  Substance Readings (15 min) Evaluation   1 Alternaria alternata (tenuis)  -    2 Cladosporium herbarum -    3 Aspergillus fumigatus -    4 Penicillium notatum -    5 Dermatophagoides pteronyssinus +++    6 Dermatophagoides farinae +++    7 Dog epithelium (canis spp) -    8 Cat hair (aida catus) -    9 Cockroach   (Blatella americana & germanica) -    10 Grass mix midwest   (Briana, Orchard, Redtop, Edwin) -    11 Arvind grass (sorghum halepense) -    12 Weed mix   (common Cocklebur, Lamb s quarters, rough redroot Pigweed, Dock/Sorrel) -    13 Mug wort (artemisia vulgare) -    14 Ragweed giant/short (ambrosia spp) -    15 English Plantain (plantago lanceolata) -    16 Tree mix 1 (Pecan, Maple BHR, Oak RVW, american [place], black [place]) -    17 Red cedar (juniperus virginia) -    18 Tree mix 2   (white Hay, river/red Birch, black [place], common Seminole, american Elm) -    19 Box elder/Maple mix (acer spp) +    20 Otero shagbark (carya ovata) -       -      Conclusion: clear sensitization to house dust mites, which correlates with previous Asthma during the winter season, but since pregnancy this sensitization seems not to be clinically relevant. However, a sign for atopic predisposition    Order for PATCH TESTS    [x] Outpatient  [] Inpatient: [facility]..../ Bed ....      Skin Atopy (atopic dermatitis) [x] Yes   [] No ??  Rhinitis/Sinusitis:   [] Yes   [x] No  Allergic Asthma:   [x] Yes   [] No ?  Food Allergy:   [] Yes   [x] No  Leg ulcers:   [] Yes   [x] No  Hand eczema:   [x] Yes   [] No   Leading hand:   [x] R   [] L       [] Ambidextrous                        Reason for tests (suspected allergy): recurrent subacute-chronic dermatitis on palmae and elbows, knees and lips (lip balm)  Known previous allergies: fashion jewelery (metals) and maybe additives in creams or cosmetics (most of them irritate)    Standardized panels  [x] Standard panel (40 tests)  [x] Preservatives & Antimicrobials (31 tests)  [x]  Emulsifiers & Additives (25 tests)   [] Perfumes/Flavours & Plants (25 tests)  [] Hairdresser panel (12 tests)  [x] Rubber Chemicals (22 tests)  [x] Plastics (26 tests)  [] Colorants/Dyes/Food additives (20 tests)  [] Metals (implants/dental) (24 tests)  [] Local anaesthetics/NSAIDs (13 tests)  [x] Antibiotics & Antimycotics (14 tests)   [] Corticosteroids (15 tests)   [] Photopatch test (62 tests)   [] others: ...      [] Patient's own products: ...    DO NOT test if chemical or biological identity is unknown!     always ask from patient the product information and safety sheets (MSDS)   RESULTS & EVALUATION of PATCH TESTS    Patch test readings after     [x] 2 days, [] 3 days [x] 4 days, [] 5 days,    Applied patch tests with results (import here the list of patch tests):  Order documented by: Isa Corrales CMA  Order reviewed by: Sandie Massey LPN   Physician:    Date/time of application:09/16/2019  Localization of application: Back >>> no eczema of the back    STANDARD Series         No Substance 2 days 4 days remarks   1 Horacio Mix [C] - -    2 Colophony - -    3  2-Mercaptobenzothiazole  - -     4 Methylisothiazolinone - -    5 Carba Mix - -    6 Thiuram Mix [A] - -    7 Bisphenol A Epoxy Resin - -    8 V-Gomx-Fhkwmztezat-Formaldehyde Resin - -    9 Mercapto Mix [A] - -    10 Black Rubber Mix- PPD [B] - -    11 Potassium Dichromate  -  -    12 Balsam of [place] (Myroxylon Pereirae Resin) - -    13 Nickel Sulphate Hexahydrate - ++    14 Mixed Dialkyl Thiourea - -    15 Paraben Mix [B] - -    16 Methyldibromo Glutaronitrile - -    17 Fragrance Mix - -    18 2-Bromo-2-Nitropropane-1,3-Diol (Bronopol) - -    19 Lyral - -    20 Tixocortol-21- Pivalate - -    21 Diazolidiyl Urea (Germall II) - -    22 Methyl Methacrylate - -    23 Cobalt (II) Chloride Hexahydrate - -    24 Fragrance Mix II  - -    25 Compositae Mix - -    26 Benzoyl Peroxide - -    27 Bacitracin - -    28 Formaldehyde - -    29  Methylchloroisothiazolinone / Methylisothiazolinone - -    30 Corticosteroid Mix - -    31 Sodium Lauryl Sulfate - -    32 Lanolin Alcohol - -    33 Turpentine - -    34 Cetylstearylalcohol - -    35 Chlorhexidine Dicluconate - -    36 Budenoside - -    37 Imidazolidinyl Urea  - -    38 Ethyl-2 Cyanoacrylate - -    39 Quaternium 15 (Dowicil 200) - -    40 Decyl Glucoside - -      EMULSIFIERS & ADDITIVES        No Substance 2 days 4 days remarks   41 Polyethylene Glycol-400 - -    42 Cocamidopropyl Betaine - -    43 Amerchol L101 - -    44 Propylene Glycol - -    45 Triethanolamine - -    46 Sorbitane Sesquiolate - -    47 Isopropylmyristate - -    48 Polysorbate 80  - -    49 Amidoamine   (Stearamidopropyl Dimethylamine) - -    50 Oleamidopropyl Dimethylamine - -    51 Lauryl Glucoside - -    52 Coconut Diethanolamide  - -    53 2-Hydroxy-4-Methoxy Benzophenone (Oxybenzone) - -    54 Benzophenone-4 (Sulisobenzon) - -    55 Propolis - -    56 Dexpanthenol - -    57 Carboxymethyl Cellulose Sodium - -    58 Abitol - -    59 Tert-Butylhydroquinone - -    60 Benzyl Salicylate - -     Antioxidant      61 Dodecyl Gallate - -    62 Butylhydroxyanisole (BHA) - -    63 Butylhydroxytoluene (BHT) - -    64 Di-Alpha-Tocopherol (Vit E) - -    65 Propyl Gallate - -      PRESERVATIVES & ANTIMICROBIALS         No Substance 2 days 4 days remarks   66  1,2-Benzisothiazoline-3-One, Sodium Salt - -    67  1,3,5-Rafat (2-Hydroxyethyl) - Hexahydrotriazine (Grotan BK) - -    68 3-Zbznxebnorvgz-7-Nitro-1, 3-Propanediol - -    69  3, 4, 4' - Triclocarban - -    70 4 - Chloro - 3 - Cresol - -    71 4 - Chloro - 4 - Xylenol (PCMX) - -    72 7-Ethylbicyclooxazolidine (Bioban JA1359) - -    73 Benzalkonium Chloride - -    74 Benzyl Alcohol - -    75 Cetalkonium Chloride - -    76 Cetylpyrimidine Chloride  - -    77 Chloroacetamide - -    78 DMDM Hydantoin - -    79 Glutaraldehyde - -    80 Triclosan - - N/A   81 Glyoxal Trimeric Dihydrate - -     82 Iodopropynyl Butylcarbamate - -    83 Octylisothiazoline - -    84 Iodoform - -    85 (Nitrobutyl) Morpholine/(Ethylnitro-Trimethylene) Dimorpholine (Bioban P 1487) - -    86 Phenoxyethanol - -    87 Phenyl Salicylate - -    88 Povidone Iodine - -    89 Sodium Benzoate - -    90 Sodium Disulfite - -    91 Sorbic Acid - -    92 Thimerosal - -     Parabens      93 Butyl-P-Hydroxybenzoate - -    94 Ethyl-P-Hydroxybenzoate - -    95 Methyl-P-Hydroxybenzoate - -    96 Propyl-P-Hydroxybenzoate - -      RUBBER CHEMICALS         No Substance 2 days 4 days remarks    Carbamate      97 Zinc Bis ( Diethyldithio carbamate ) (ZDEC) - -    98 Zinc Bis (Dibutyldithiocarbamate) - -    99 1,3-Diphenylguanidine (DPG) - -     Thiourea      100 Dibutylthiourea - -    101 Diphenyltiourea - -    102 Thiourea - -     Mercapto chemicals      103 Morpholinyl Mercaptobenzothiazole - -    104 E-Ccufhamvma-1-Benzothiazyl-Sulfenamide - -    105 Dibenzothiazyl Disulfide - -     Thiuram chemicals      106 Dipentamethylenethiuram Disulfide - -    107 Tetraethylthiuram Disulfide (Disulfiram) - -    108 Tetramethylthiuram Disulfide - -    109 Tetramethyl Thiuram Monosulfide (TMTM) - -     4-Phenylenediamine derivatives      110 N-Isopropyl-N'-Phenyl-P-Phenylenediamine (IPPD) - -    111 Gslhursy-J-Qhdsplpeuymtxrfo (DPPD) - -     Various Rubber Additives      112 Hydroquinone Monobenzylether  - -    113 Hexamethylenetetramine - -    114 4,4'-Dihydroxybiphenyl - -    115 Cyclohexylthiophtalimide - -    116 Ethylenediamine Dihydrochloride - -    117 N-Phenyl-B-Naphthylamine - -    118 Dodecyl Mercaptan - -        PLASTICS         No Substance 2 days 4 days remarks    Acrylates - -    119 2-Hydroxyethyl Methacrylate (HEMA) - -    120 1,4-Butandioldimethacrylate (BUDMA) - -    121  2-Ethylhexyl Acrylate - -    122 Bisphenol-A-Dimethacrylate  - -    123 Diurethane-Dimethacrylate - -    124 Ethyleneglycoldimethacrylate (EGDMA) - -    125  Pentaerythritoltriacrylate (BEAR) - -    126 Triethylene Glycol Dimethacrylate (TEGDMA) - -     Synthetic material/additives       127 L-Bmoe-Vxpjwcfvpsu - -    128 Tricresyl Phosphate - -    129 0-Iyak-Wupwrrcovvluw - -    130 Bis (2-Ethylhexyl) Phthalate - -    131 Dibutylphthalate - -    132 Dimethylphthalate - -    133 Toluene-2,4-Diisocyanate - -    134 Diphenylmethane-4,4''-Diisocyanate - -     EPOXY RESIN SYSTEMS       Reactive Solvents - -    135 Cresyl Glycidyl Ether - -    136 Butyl Glycidyl Ether - -    137 Phenyl Glycidyl Ether - -    138 1,4-Butanediol Diglycidyl Ether - -    139 1,6-Hexanediole Diglycidyl Ether - -     Hardener / Accelerator - -    140 Ethylenediamine Dihydrochloride - -    141 Triethylenetetramine - -    142 Diethylenetriamine - -    143 Isophorone Diamine (IPD) - -    144 N,N-Dimethyl-P-Toluidine - -      ANTIBIOTICS & ANTIMYCOTICS         No Substance 2 days 4 days remarks   145 Erythromycine - -    146 Framycetine Sulphate - -    147 Fusidic Acid Sodium Salt - -    148 Gentamicin Sulphate - -    149 Neomycine Sulphate - -    150 Oxytetracycline  - -    151 Polymyxin B Sulphate - -    152 Tetracycline-HCL - -    153 Sulfanilamide - -    154 Metronidazole - -    155 Oxyquinoline Mix - -    156 Nitrofurazone - -    157 Nystatin - -    158 Clotrimazole - -      PROGESTERON 100mg Tabl (10/18/2019)         No Substance 3 days  days remarks          1 Progesteron Tabl as is -     2 Progesteron Tabl in Vaseline -     10/21/2019: Patient called and there was no delayed reaction    Results of patch tests:                         Interpretation:    - Negative                    A    = Allergic      (+) Erythema    TI   = Toxic/irritant   + E + Infiltration    RaP = Relevance at Present     ++ E/I + Papulovesicle   Rpr  = Relevance Previously     +++ E/I/P + Blister     nR   = No Relevance    [x] Allergic reaction diagnosed against: Nickel       Interpretation/ remarks:   The allergy against  nickel is clinically relevant because the patient indicates reactions to fashion jewelry. In addition, it clearly shows that the patient has an atopic predisposition where nickel sensitizations are very common. However, we could not see relevant contact allergies to other compounds like additives/preservatives. Therefore, we hypothesize based on the nickel sensitization and the positive prick test to house dust mite that the skin problems are primarily linked to an atopic dermatitis.     [x] Patient information given   [] ACDS information   [x] SmartPractice information    ==> final Diagnosis:     >>> Subacute-chronic dyshidrotic hand eczema on hands and eczemas on extremities based on atopic predisposition (atopic dermatitis) and clear toxic irritant component.      Clinically relevant sensitization to nickel     Clinically relevant immediate type reaction to house dust mites with recurrent asthma     No signs for allergic contact sensitization to other compounds    Aggravation during cycles (probably hormonal aggravation during the cycles and improvement after removal of Progesteron implant)    ==> Treatment prescribed/Plan:    We will have the pt try a patch test for progesterone tablets and see if there is a reaction.    Pt was sent back to Anita Diego PA-C for this irritation. She saw Brandie 10/18/19. Recommend UV treatment, which I highly support even if there would be the case of pregnancy. There is no contra-indication for UV light and pregnancy. Pt should continue her topical treatment.    For emollients, pt has used aquaphor and serve. Also vanicream which led to a burning sensation. Continue vanicream at the elbows 2-3x daily. If she has an irritation, we will try a different emollient.    Continue topical treatment protopic 0.1% ointment every morning and evening M-F.  If this does not help, another option is narrow bed UV treatment of the hands. Pt informed that she would have to come in 2-3x per week  for this. May also pursue dupixent for the pt.     For Saturdays and Sundays, the pt can use betamomethasone ointment (her insurance does not accept mometasone). She has tried clobetasol without relief.    9/6/19 Prescribed 10% iodine solution for the bilateral hands. She can dilute it 1:10 and soak her hands in it for 4-10 minutes. 9/6/19 Prescribed gentian violet; we will prescribe her this to put on prior her lidex ointment.     Suave for shampoo and Ivory for body soap.       These conclusions are made at the best of ones knowledge and belief  based on the provided evidence such as patients history and allergy test results and they can change over time or can be incomplete because of missing informations.      CC Anita Diego PA-C  [address] [place] [phone] on close of this encounter.      Staff Involved:    Scribe Disclosure  I, Pao Trinidad, am serving as a scribe to document services personally performed by Anders Dietz, based on data collection and the provider's statements to me.     Start Time: 7:38 AM  End Time: 8:10 AM    I spent a total of 22 minutes face to face with Pao Pereira during today s office visit. Over 50% of this time was spent counseling the patient and/or coordinating care.  Please see Assessment and Plan for details.  This excludes any time spent performing patch tests

## 2019-10-22 NOTE — TELEPHONE ENCOUNTER
"Monica Reyes  You 21 hours ago (12:24 PM)      Hello,   \"NO\" PA is required for the UV treatment.  Coverage is based on medical necessity   Monica Dasilva    Routing comment       You  Monica Reyes 4 days ago      Can you check coverage for UV for her?     Thank you,   Sandie Massey LPN      Routing comment        "

## 2019-11-25 ENCOUNTER — OFFICE VISIT (OUTPATIENT)
Dept: OBGYN | Facility: CLINIC | Age: 39
End: 2019-11-25
Payer: COMMERCIAL

## 2019-11-25 VITALS — WEIGHT: 188 LBS | BODY MASS INDEX: 34.39 KG/M2 | SYSTOLIC BLOOD PRESSURE: 126 MMHG | DIASTOLIC BLOOD PRESSURE: 78 MMHG

## 2019-11-25 DIAGNOSIS — R10.2 PELVIC PAIN: Primary | ICD-10-CM

## 2019-11-25 LAB
ERYTHROCYTE [DISTWIDTH] IN BLOOD BY AUTOMATED COUNT: 13 % (ref 10–15)
HCT VFR BLD AUTO: 41.9 % (ref 35–47)
HGB BLD-MCNC: 14 G/DL (ref 11.7–15.7)
MCH RBC QN AUTO: 29.7 PG (ref 26.5–33)
MCHC RBC AUTO-ENTMCNC: 33.4 G/DL (ref 31.5–36.5)
MCV RBC AUTO: 89 FL (ref 78–100)
PLATELET # BLD AUTO: 255 10E9/L (ref 150–450)
RBC # BLD AUTO: 4.72 10E12/L (ref 3.8–5.2)
WBC # BLD AUTO: 6.9 10E9/L (ref 4–11)

## 2019-11-25 PROCEDURE — 82728 ASSAY OF FERRITIN: CPT | Performed by: OBSTETRICS & GYNECOLOGY

## 2019-11-25 PROCEDURE — 82306 VITAMIN D 25 HYDROXY: CPT | Performed by: OBSTETRICS & GYNECOLOGY

## 2019-11-25 PROCEDURE — 99203 OFFICE O/P NEW LOW 30 MIN: CPT | Performed by: OBSTETRICS & GYNECOLOGY

## 2019-11-25 PROCEDURE — 36415 COLL VENOUS BLD VENIPUNCTURE: CPT | Performed by: OBSTETRICS & GYNECOLOGY

## 2019-11-25 PROCEDURE — 85027 COMPLETE CBC AUTOMATED: CPT | Performed by: OBSTETRICS & GYNECOLOGY

## 2019-11-25 ASSESSMENT — ANXIETY QUESTIONNAIRES
1. FEELING NERVOUS, ANXIOUS, OR ON EDGE: NOT AT ALL
2. NOT BEING ABLE TO STOP OR CONTROL WORRYING: NOT AT ALL
5. BEING SO RESTLESS THAT IT IS HARD TO SIT STILL: NOT AT ALL
7. FEELING AFRAID AS IF SOMETHING AWFUL MIGHT HAPPEN: NOT AT ALL
IF YOU CHECKED OFF ANY PROBLEMS ON THIS QUESTIONNAIRE, HOW DIFFICULT HAVE THESE PROBLEMS MADE IT FOR YOU TO DO YOUR WORK, TAKE CARE OF THINGS AT HOME, OR GET ALONG WITH OTHER PEOPLE: NOT DIFFICULT AT ALL
6. BECOMING EASILY ANNOYED OR IRRITABLE: NOT AT ALL
3. WORRYING TOO MUCH ABOUT DIFFERENT THINGS: NOT AT ALL
GAD7 TOTAL SCORE: 0

## 2019-11-25 ASSESSMENT — PATIENT HEALTH QUESTIONNAIRE - PHQ9
5. POOR APPETITE OR OVEREATING: NOT AT ALL
SUM OF ALL RESPONSES TO PHQ QUESTIONS 1-9: 0

## 2019-11-25 NOTE — PROGRESS NOTES
SUBJECTIVE:                                                   Pao Pereira is a 38 year old female who presents to clinic today for the following health issue(s):  Patient presents with:  Consult: endometriosis-dx'd 2 years ago, 2 months having bad pain      HPI: The patient has a very complicated history of possible endometriosis.  She had 2 normal spontaneous vaginal deliveries in the past without any fertility issues.  2 years ago she had terrible pain and ultrasound findings were suspicious for deep endometriosis and bilateral ovarian disease.  She was treated with suppression with a Mirena IUD that she had removed this spring because of dermatologic reactions.  She has not had an ultrasound in over 2 years.  She has never had a laparoscopy for confirmation of true endometriosis.      Patient's last menstrual period was 2019 (exact date)..     Patient is sexually active, .  Using nothing for contraception.    reports that she has never smoked. She has never used smokeless tobacco.    STD testing offered?  Declined    Health maintenance updated:  yes    Today's PHQ-2 Score:   PHQ-2 (  [employer]) 2019   Q1: Little interest or pleasure in doing things 0   Q2: Feeling down, depressed or hopeless 0   PHQ-2 Score 0   Q1: Little interest or pleasure in doing things Not at all   Q2: Feeling down, depressed or hopeless Not at all   PHQ-2 Score 0     Today's PHQ-9 Score: No flowsheet data found.  Today's HOLGER-7 Score:   HOLGER-7 SCORE 2019   Total Score 0 (minimal anxiety)   Total Score 0       Problem list and histories reviewed & adjusted, as indicated.  Additional history: as documented.    Patient Active Problem List   Diagnosis     Irritant contact dermatitis, unspecified trigger     Pruritus     Past Surgical History:   Procedure Laterality Date     BIOPSY      GYN      Social History     Tobacco Use     Smoking status: Never Smoker     Smokeless tobacco: Never Used   Substance Use Topics      Alcohol use: Yes     Alcohol/week: 1.0 standard drinks     Comment: Very rarely      Problem (# of Occurrences) Relation (Name,Age of Onset)    Bone Cancer (1) Maternal Uncle    Cerebral aneurysm (1) Maternal Grandfather    Colon Cancer (1) Paternal Aunt    Diabetes (1) Maternal Grandfather    Hypertension (1) Father    Kidney failure (1) Maternal Grandfather: was on dialysis    Myocardial Infarction (1) Maternal Grandfather    Thyroid Cancer (2) Maternal Grandmother, Maternal Aunt            Current Outpatient Medications   Medication Sig     betamethasone dipropionate (DIPROSONE) 0.05 % external ointment Apply topically twice a week On Saturday and Sunday     gentian violet 1 % external solution Paint eczematous lesions on hands before adding the Lidex oint     tacrolimus (PROTOPIC) 0.1 % external ointment Apply topically 2 times daily From Monday to Friday 2 times daily on hands and other eczema lesions     No current facility-administered medications for this visit.      Allergies   Allergen Reactions     Dust Mites      Nickel        ROS:  12 point review of systems negative other than symptoms noted below or in the HPI.  Genitourinary: Pelvic Pain  No urinary frequency or dysuria, bladder or kidney problems      OBJECTIVE:     Wt 85.3 kg (188 lb)   LMP 11/04/2019 (Exact Date)   Breastfeeding No   BMI 34.39 kg/m    Body mass index is 34.39 kg/m .    Exam:  Constitutional:  Appearance: Well nourished, well developed alert, in no acute distress  Gastrointestinal:  Abdominal Examination:  Abdomen nontender to palpation, tone normal without rigidity or guarding, no masses present, umbilicus without lesions; Liver/Spleen:  No hepatomegaly present, liver nontender to palpation; Hernias:  No hernias present  Lymphatic: Lymph Nodes:  No other lymphadenopathy present  Skin: General Inspection:  No rashes present, no lesions present, no areas of discoloration.  Neurologic:  Mental Status:  Oriented X3.  Normal  strength and tone, sensory exam grossly normal, mentation intact and speech normal.    Psychiatric:  Mentation appears normal and affect normal/bright.  Pelvic Exam:  External Genitalia:     Normal appearance for age, no discharge present, no tenderness present, no inflammatory lesions present, color normal  Vagina:     Normal vaginal vault without central or paravaginal defects, no discharge present, no inflammatory lesions present, no masses present  Bladder:     Nontender to palpation  Urethra:   Urethral Body:  Urethra palpation normal, urethra structural support normal   Urethral Meatus:  No erythema or lesions present  Cervix:     Appearance healthy, no lesions present, nontender to palpation, no bleeding present  Uterus:     Uterus: firm, enlarged and multi-bosselated and nontender, midplane in position.   Adnexa:     No adnexal tenderness present, no adnexal masses present  Perineum:     Perineum within normal limits, no evidence of trauma, no rashes or skin lesions present  Anus:     Anus within normal limits, no hemorrhoids present  Inguinal Lymph Nodes:     No lymphadenopathy present  Pubic Hair:     Normal pubic hair distribution for age  Genitalia and Groin:     No rashes present, no lesions present, no areas of discoloration, no masses present       In-Clinic Test Results:      ASSESSMENT/PLAN:                                                        Patient with history of pelvic pain and possible endometriosis without recent ultrasound findings or confirmation surgically.  Her examination does not show any nodularity or decreased mobility.  There is an irregular enlargement of her uterus to approximately 12 weeks size.  We will order a vaginal probe ultrasound and discuss next steps from that.  She also request some lab work.  She will need a ferritin of vitamin D and a CBC with a differential.      Jd Epps MD  [facility] [place]

## 2019-11-26 LAB — FERRITIN SERPL-MCNC: 14 NG/ML (ref 12–150)

## 2019-11-26 ASSESSMENT — ANXIETY QUESTIONNAIRES: GAD7 TOTAL SCORE: 0

## 2019-11-27 LAB — DEPRECATED CALCIDIOL+CALCIFEROL SERPL-MC: 27 UG/L (ref 20–75)

## 2019-12-09 NOTE — PROGRESS NOTES
SUBJECTIVE:                                                   Pao Pereira is a 39 year old female who presents to clinic today for the following health issue(s):  Patient presents with:  Ultrasound: Pelvic pain      Additional information: bumps on back that become tender during menses    HPI: The patient is seen at this time for follow-up of recurrent pelvic pain.  She has been treated for endometriosis without ever having a surgical confirmation.  Ultrasound is planned today.      Patient's last menstrual period was 2019..     Patient is sexually active, .  Using none for contraception.    reports that she has never smoked. She has never used smokeless tobacco.    STD testing offered?  Declined    Health maintenance updated:  yes    Today's PHQ-2 Score:   PHQ-2 (  [employer]) 2019   Q1: Little interest or pleasure in doing things 0   Q2: Feeling down, depressed or hopeless 0   PHQ-2 Score 0   Q1: Little interest or pleasure in doing things Not at all   Q2: Feeling down, depressed or hopeless Not at all   PHQ-2 Score 0     Today's PHQ-9 Score:   PHQ-9 SCORE 2019   PHQ-9 Total Score 0     Today's HOLGER-7 Score:   HOLGER-7 SCORE 2019   Total Score -   Total Score 0       Problem list and histories reviewed & adjusted, as indicated.  Additional history: as documented.    Patient Active Problem List   Diagnosis     Irritant contact dermatitis, unspecified trigger     Pruritus     Past Surgical History:   Procedure Laterality Date     BIOPSY      GYN      Social History     Tobacco Use     Smoking status: Never Smoker     Smokeless tobacco: Never Used   Substance Use Topics     Alcohol use: Yes     Alcohol/week: 1.0 standard drinks     Comment: Very rarely      Problem (# of Occurrences) Relation (Name,Age of Onset)    Bone Cancer (1) Maternal Uncle    Breast Cancer (2) Maternal Aunt, Other: MGGM    Cerebral aneurysm (1) Maternal Grandfather    Colon Cancer (2) Maternal Aunt, Paternal Aunt  "   Diabetes (1) Maternal Grandfather    Hypertension (1) Father    Kidney failure (1) Maternal Grandfather: was on dialysis    Myocardial Infarction (1) Maternal Grandfather    Thyroid Cancer (2) Maternal Grandmother, Maternal Aunt            Current Outpatient Medications   Medication Sig     betamethasone dipropionate (DIPROSONE) 0.05 % external ointment Apply topically twice a week On Saturday and Sunday     gentian violet 1 % external solution Paint eczematous lesions on hands before adding the Lidex oint     tacrolimus (PROTOPIC) 0.1 % external ointment Apply topically 2 times daily From Monday to Friday 2 times daily on hands and other eczema lesions     No current facility-administered medications for this visit.      Allergies   Allergen Reactions     Dust Mites      Nickel        ROS:  12 point review of systems negative other than symptoms noted below or in the HPI.  No urinary frequency or dysuria, bladder or kidney problems      OBJECTIVE:     /70   Ht 1.575 m (5' 2\")   Wt 83.5 kg (184 lb)   LMP 11/27/2019   Breastfeeding No   BMI 33.65 kg/m    Body mass index is 33.65 kg/m .    Exam:  Constitutional:  Appearance: Well nourished, well developed alert, in no acute distress  Neck:  Lymph Nodes:  No lymphadenopathy present; Thyroid:  Gland size normal, nontender, no nodules or masses present on palpation  Chest:  Respiratory Effort:  Breathing unlabored. Clear to auscultation bilaterally.   Cardiovascular: Heart: Auscultation:  Regular rate, normal rhythm, no murmurs present  Gastrointestinal:  Abdominal Examination:  Abdomen nontender to palpation, tone normal without rigidity or guarding, no masses present, umbilicus without lesions; Liver/Spleen:  No hepatomegaly present, liver nontender to palpation; Hernias:  No hernias present  Lymphatic: Lymph Nodes:  No other lymphadenopathy present  Skin: General Inspection:  No rashes present, no lesions present, no areas of discoloration.  Neurologic:  " Mental Status:  Oriented X3.  Normal strength and tone, sensory exam grossly normal, mentation intact and speech normal.    Psychiatric:  Mentation appears normal and affect normal/bright.  No Pelvic Exam performed     In-Clinic Test Results:  Results for orders placed or performed in visit on 12/10/19   US Pelvic Limited    Narrative    US Pelvic Limited   Order #: [id number] Accession #: [id number]   Study Notes      Indy Thorpe on 12/10/2019  1:35 PM   Gynecological Ultrasound Report  Pelvic U/S - Transvaginal    [facility]  Referring Provider: Dr. Jd Epps  Sonographer: Indy Thorpe RDMS  Indication: Pelvic pain  LMP (mm/dd/yyyy): 11/28/19  History:   Gynecological Ultrasonography:   Uterus: anteverted  Size: 9.89 x 5.50 x 5.83cm.    Findings: Normal   Endometrium: Thickness total 15.11mm  Findings: Thick  Right Ovary: 8.51 x 6.71 x 6.65cm.    Findings: Right ovarian cyst with debris= 7.9x 5.7x 6cm  Left Ovary: 4.51 x 4.14 x 3.69cm.   Findings: Left ovarian cyst with debris= 4.1x 3.2x 3.3cm  Cul de Sac/Pouch of [place]: No FF      Impression: Endometrial hyperplasia, bilateral ovarian cysts  __Jd Epps MD_________________________________________________________________________  _______         Discussed here             ASSESSMENT/PLAN:                                                      Patient with progressive pelvic pain, bilateral ovarian cyst, endometrial hyperplasia, and a past history of being treated for endometriosis without a confirmatory diagnosis.  We have recommended a hysteroscopy D&C and laparoscopy with CO2 laser for definitive diagnosis and treatment.  The risk and complications of been reviewed and accepted by the patient.  This includes general anesthesia blood loss infection injury to bowel bladder and ureters.  The patient will schedule this at her own disposition.          Jd Epps MD  [facility]

## 2019-12-10 ENCOUNTER — PREP FOR PROCEDURE (OUTPATIENT)
Dept: OBGYN | Facility: CLINIC | Age: 39
End: 2019-12-10

## 2019-12-10 ENCOUNTER — OFFICE VISIT (OUTPATIENT)
Dept: OBGYN | Facility: CLINIC | Age: 39
End: 2019-12-10
Attending: OBSTETRICS & GYNECOLOGY
Payer: COMMERCIAL

## 2019-12-10 ENCOUNTER — ANCILLARY PROCEDURE (OUTPATIENT)
Dept: ULTRASOUND IMAGING | Facility: CLINIC | Age: 39
End: 2019-12-10
Attending: OBSTETRICS & GYNECOLOGY
Payer: COMMERCIAL

## 2019-12-10 VITALS
WEIGHT: 184 LBS | SYSTOLIC BLOOD PRESSURE: 132 MMHG | DIASTOLIC BLOOD PRESSURE: 70 MMHG | BODY MASS INDEX: 33.86 KG/M2 | HEIGHT: 62 IN

## 2019-12-10 DIAGNOSIS — N83.201 CYSTS OF BOTH OVARIES: Primary | ICD-10-CM

## 2019-12-10 DIAGNOSIS — N83.202 CYSTS OF BOTH OVARIES: Primary | ICD-10-CM

## 2019-12-10 DIAGNOSIS — R10.2 PELVIC PAIN: ICD-10-CM

## 2019-12-10 PROCEDURE — 99214 OFFICE O/P EST MOD 30 MIN: CPT | Performed by: OBSTETRICS & GYNECOLOGY

## 2019-12-10 PROCEDURE — 76830 TRANSVAGINAL US NON-OB: CPT | Performed by: OBSTETRICS & GYNECOLOGY

## 2019-12-10 ASSESSMENT — MIFFLIN-ST. JEOR: SCORE: 1462.87

## 2019-12-11 ENCOUNTER — TELEPHONE (OUTPATIENT)
Dept: OBGYN | Facility: CLINIC | Age: 39
End: 2019-12-11

## 2019-12-11 PROBLEM — N83.202 CYSTS OF BOTH OVARIES: Status: ACTIVE | Noted: 2019-12-11

## 2019-12-11 PROBLEM — N83.201 CYSTS OF BOTH OVARIES: Status: ACTIVE | Noted: 2019-12-11

## 2019-12-11 NOTE — TELEPHONE ENCOUNTER
Type of surgery: [facility] D&C LSC C02 LASER  Location of surgery: [facility] OR  Date and time of surgery: 12/31/2019 7:20a  Surgeon: Darshana  Pre-Op Appt Date: 12/10/2019  Post-Op Appt Date: TBD   Packet sent out: HANDED 12/10/2019  Pre-cert/Authorization completed:  TBD  Date: 12/11/2019 Marion lindsay/Adrianna Haq  Surgery Scheduler    DX R10.2   N83.20  CPT 69715    [id number]

## 2019-12-30 ENCOUNTER — ANESTHESIA EVENT (OUTPATIENT)
Dept: SURGERY | Facility: CLINIC | Age: 39
End: 2019-12-30
Payer: COMMERCIAL

## 2019-12-30 RX ORDER — PHENAZOPYRIDINE HYDROCHLORIDE 200 MG/1
200 TABLET, FILM COATED ORAL ONCE
Status: CANCELLED | OUTPATIENT
Start: 2019-12-30 | End: 2019-12-30

## 2019-12-30 NOTE — H&P
Office Visit     12/10/2019  [facility] Jd Lucio MD   OB/Gyn   Cysts of both ovaries   Dx   Ultrasound   ; Referred by Jd Epps MD   Reason for Visit    Progress Notes              SUBJECTIVE:                                                   Pao Pereira is a 39 year old female who presents to clinic today for the following health issue(s):  Patient presents with:  Ultrasound: Pelvic pain        Additional information: bumps on back that become tender during menses     HPI: The patient is seen at this time for follow-up of recurrent pelvic pain.  She has been treated for endometriosis without ever having a surgical confirmation.  Ultrasound is planned today.        Patient's last menstrual period was 2019..      Patient is sexually active, .  Using none for contraception.    reports that she has never smoked. She has never used smokeless tobacco.     STD testing offered?  Declined     Health maintenance updated:  yes     Today's PHQ-2 Score:   PHQ-2 (  [employer]) 2019   Q1: Little interest or pleasure in doing things 0   Q2: Feeling down, depressed or hopeless 0   PHQ-2 Score 0   Q1: Little interest or pleasure in doing things Not at all   Q2: Feeling down, depressed or hopeless Not at all   PHQ-2 Score 0      Today's PHQ-9 Score:   PHQ-9 SCORE 2019   PHQ-9 Total Score 0      Today's HOLGER-7 Score:   HOLGER-7 SCORE 2019   Total Score -   Total Score 0         Problem list and histories reviewed & adjusted, as indicated.  Additional history: as documented.         Patient Active Problem List   Diagnosis     Irritant contact dermatitis, unspecified trigger     Pruritus             Past Surgical History:   Procedure Laterality Date     BIOPSY         GYN       Social History            Tobacco Use     Smoking status: Never Smoker     Smokeless tobacco: Never Used   Substance Use Topics     Alcohol use: Yes       Alcohol/week: 1.0 standard drinks        "Comment: Very rarely       Problem (# of Occurrences) Relation (Name,Age of Onset)     Bone Cancer (1) Maternal Uncle     Breast Cancer (2) Maternal Aunt, Other: MGGM     Cerebral aneurysm (1) Maternal Grandfather     Colon Cancer (2) Maternal Aunt, Paternal Aunt     Diabetes (1) Maternal Grandfather     Hypertension (1) Father     Kidney failure (1) Maternal Grandfather: was on dialysis     Myocardial Infarction (1) Maternal Grandfather     Thyroid Cancer (2) Maternal Grandmother, Maternal Aunt                   Current Outpatient Medications   Medication Sig     betamethasone dipropionate (DIPROSONE) 0.05 % external ointment Apply topically twice a week On Saturday and Sunday     gentian violet 1 % external solution Paint eczematous lesions on hands before adding the Lidex oint     tacrolimus (PROTOPIC) 0.1 % external ointment Apply topically 2 times daily From Monday to Friday 2 times daily on hands and other eczema lesions      No current facility-administered medications for this visit.            Allergies   Allergen Reactions     Dust Mites       Nickel           ROS:  12 point review of systems negative other than symptoms noted below or in the HPI.  No urinary frequency or dysuria, bladder or kidney problems        OBJECTIVE:      /70   Ht 1.575 m (5' 2\")   Wt 83.5 kg (184 lb)   LMP 11/27/2019   Breastfeeding No   BMI 33.65 kg/m    Body mass index is 33.65 kg/m .     Exam:  Constitutional:  Appearance: Well nourished, well developed alert, in no acute distress  Neck:  Lymph Nodes:  No lymphadenopathy present; Thyroid:  Gland size normal, nontender, no nodules or masses present on palpation  Chest:  Respiratory Effort:  Breathing unlabored. Clear to auscultation bilaterally.   Cardiovascular: Heart: Auscultation:  Regular rate, normal rhythm, no murmurs present  Gastrointestinal:  Abdominal Examination:  Abdomen nontender to palpation, tone normal without rigidity or guarding, no masses present, " umbilicus without lesions; Liver/Spleen:  No hepatomegaly present, liver nontender to palpation; Hernias:  No hernias present  Lymphatic: Lymph Nodes:  No other lymphadenopathy present  Skin: General Inspection:  No rashes present, no lesions present, no areas of discoloration.  Neurologic:  Mental Status:  Oriented X3.  Normal strength and tone, sensory exam grossly normal, mentation intact and speech normal.    Psychiatric:  Mentation appears normal and affect normal/bright.  No Pelvic Exam performed      In-Clinic Test Results:      Results for orders placed or performed in visit on 12/10/19   US Pelvic Limited     Narrative     US Pelvic Limited   Order #: [id number] Accession #: [id number]   Study Notes      Indy Thorpe on 12/10/2019  1:35 PM   Gynecological Ultrasound Report  Pelvic U/S - Transvaginal    [facility]  Referring Provider: Dr. Jd Epps  Sonographer: Indy Thorpe [facility]  Indication: Pelvic pain  LMP (mm/dd/yyyy): 11/28/19  History:   Gynecological Ultrasonography:   Uterus: anteverted  Size: 9.89 x 5.50 x 5.83cm.    Findings: Normal   Endometrium: Thickness total 15.11mm  Findings: Thick  Right Ovary: 8.51 x 6.71 x 6.65cm.    Findings: Right ovarian cyst with debris= 7.9x 5.7x 6cm  Left Ovary: 4.51 x 4.14 x 3.69cm.   Findings: Left ovarian cyst with debris= 4.1x 3.2x 3.3cm  Cul de Sac/Pouch of [place]: No FF      Impression: Endometrial hyperplasia, bilateral ovarian cysts  __Jd Epps MD_________________________________________________________________________  _______         Discussed here                  ASSESSMENT/PLAN:                                                       Patient with progressive pelvic pain, bilateral ovarian cyst, endometrial hyperplasia, and a past history of being treated for endometriosis without a confirmatory diagnosis.  We have recommended a hysteroscopy D&C and laparoscopy with CO2 laser for definitive diagnosis and treatment.  The  "risk and complications of been reviewed and accepted by the patient.  This includes general anesthesia blood loss infection injury to bowel bladder and ureters.  The patient will schedule this at her own disposition.              Jd Epps MD  [facility] [place]      Instructions      After Visit Summary (Automatic SnapShot taken 12/10/2019)   Additional Documentation     Vitals:    /70    Ht 1.575 m (5' 2\")    Wt 83.5 kg (184 lb)    LMP 11/27/2019    Breastfeeding No    BMI 33.65 kg/m     BSA 1.91 m     Flowsheets:    Vitals Reassessment,    NICU VS,    Anthropometrics,    Lactation       Encounter Info:    Billing Info,    History,    Allergies,    Detailed Report       AVS Reports     Date/Time Report Action User   12/10/2019  2:36 PM After Visit Summary Automatically Generated Jd Epps MD   Encounter Information      Provider Department Encounter # [place]   12/10/2019 2:15 PM Jd Epps MD [facility] [id number] [facility]   Reviewed this Encounter      Medications Problems Allergies History   Jd Epps MD   Reviewed Family, Medical, Surgical, Tobacco   Consuelo Gaming CMA   Reviewed Family, Medical, Surgical, Tobacco   Orders Placed      None   Medication Changes        None      Medication List    Visit Diagnoses         Cysts of both ovaries      Problem List        "

## 2019-12-31 ENCOUNTER — ANESTHESIA (OUTPATIENT)
Dept: SURGERY | Facility: CLINIC | Age: 39
End: 2019-12-31
Payer: COMMERCIAL

## 2019-12-31 ENCOUNTER — SURGERY (OUTPATIENT)
Age: 39
End: 2019-12-31
Payer: COMMERCIAL

## 2019-12-31 ENCOUNTER — HOSPITAL ENCOUNTER (OUTPATIENT)
Facility: CLINIC | Age: 39
Discharge: HOME | End: 2019-12-31
Attending: OBSTETRICS & GYNECOLOGY | Admitting: OBSTETRICS & GYNECOLOGY
Payer: COMMERCIAL

## 2019-12-31 VITALS
OXYGEN SATURATION: 99 % | BODY MASS INDEX: 33.49 KG/M2 | HEART RATE: 45 BPM | RESPIRATION RATE: 12 BRPM | HEIGHT: 62 IN | TEMPERATURE: 97.7 F | WEIGHT: 182 LBS | SYSTOLIC BLOOD PRESSURE: 103 MMHG | DIASTOLIC BLOOD PRESSURE: 65 MMHG

## 2019-12-31 DIAGNOSIS — N83.202 CYSTS OF BOTH OVARIES: ICD-10-CM

## 2019-12-31 DIAGNOSIS — N83.201 CYSTS OF BOTH OVARIES: ICD-10-CM

## 2019-12-31 LAB — B-HCG SERPL-ACNC: <1 IU/L (ref 0–5)

## 2019-12-31 PROCEDURE — 25000125 ZZHC RX 250: Performed by: NURSE ANESTHETIST, CERTIFIED REGISTERED

## 2019-12-31 PROCEDURE — 58662 LAPAROSCOPY EXCISE LESIONS: CPT | Performed by: OBSTETRICS & GYNECOLOGY

## 2019-12-31 PROCEDURE — 36000063 ZZH SURGERY LEVEL 4 EA 15 ADDTL MIN: Performed by: OBSTETRICS & GYNECOLOGY

## 2019-12-31 PROCEDURE — 25000128 H RX IP 250 OP 636: Performed by: NURSE ANESTHETIST, CERTIFIED REGISTERED

## 2019-12-31 PROCEDURE — 25000128 H RX IP 250 OP 636: Performed by: OBSTETRICS & GYNECOLOGY

## 2019-12-31 PROCEDURE — 25000566 ZZH SEVOFLURANE, EA 15 MIN: Performed by: OBSTETRICS & GYNECOLOGY

## 2019-12-31 PROCEDURE — 25800030 ZZH RX IP 258 OP 636: Performed by: NURSE ANESTHETIST, CERTIFIED REGISTERED

## 2019-12-31 PROCEDURE — 40000170 ZZH STATISTIC PRE-PROCEDURE ASSESSMENT II: Performed by: OBSTETRICS & GYNECOLOGY

## 2019-12-31 PROCEDURE — 37000008 ZZH ANESTHESIA TECHNICAL FEE, 1ST 30 MIN: Performed by: OBSTETRICS & GYNECOLOGY

## 2019-12-31 PROCEDURE — 88305 TISSUE EXAM BY PATHOLOGIST: CPT | Mod: 26 | Performed by: OBSTETRICS & GYNECOLOGY

## 2019-12-31 PROCEDURE — 27210794 ZZH OR GENERAL SUPPLY STERILE: Performed by: OBSTETRICS & GYNECOLOGY

## 2019-12-31 PROCEDURE — 37000009 ZZH ANESTHESIA TECHNICAL FEE, EACH ADDTL 15 MIN: Performed by: OBSTETRICS & GYNECOLOGY

## 2019-12-31 PROCEDURE — 36415 COLL VENOUS BLD VENIPUNCTURE: CPT | Performed by: OBSTETRICS & GYNECOLOGY

## 2019-12-31 PROCEDURE — 84702 CHORIONIC GONADOTROPIN TEST: CPT | Performed by: OBSTETRICS & GYNECOLOGY

## 2019-12-31 PROCEDURE — 88305 TISSUE EXAM BY PATHOLOGIST: CPT | Performed by: OBSTETRICS & GYNECOLOGY

## 2019-12-31 PROCEDURE — 58558 HYSTEROSCOPY BIOPSY: CPT | Mod: 51 | Performed by: OBSTETRICS & GYNECOLOGY

## 2019-12-31 PROCEDURE — 71000027 ZZH RECOVERY PHASE 2 EACH 15 MINS: Performed by: OBSTETRICS & GYNECOLOGY

## 2019-12-31 PROCEDURE — 36000093 ZZH SURGERY LEVEL 4 1ST 30 MIN: Performed by: OBSTETRICS & GYNECOLOGY

## 2019-12-31 PROCEDURE — 71000012 ZZH RECOVERY PHASE 1 LEVEL 1 FIRST HR: Performed by: OBSTETRICS & GYNECOLOGY

## 2019-12-31 PROCEDURE — 71000013 ZZH RECOVERY PHASE 1 LEVEL 1 EA ADDTL HR: Performed by: OBSTETRICS & GYNECOLOGY

## 2019-12-31 RX ORDER — PROPOFOL 10 MG/ML
INJECTION, EMULSION INTRAVENOUS CONTINUOUS PRN
Status: DISCONTINUED | OUTPATIENT
Start: 2019-12-31 | End: 2019-12-31

## 2019-12-31 RX ORDER — ONDANSETRON 2 MG/ML
4 INJECTION INTRAMUSCULAR; INTRAVENOUS EVERY 30 MIN PRN
Status: DISCONTINUED | OUTPATIENT
Start: 2019-12-31 | End: 2019-12-31 | Stop reason: HOSPADM

## 2019-12-31 RX ORDER — LIDOCAINE HYDROCHLORIDE 20 MG/ML
INJECTION, SOLUTION INFILTRATION; PERINEURAL PRN
Status: DISCONTINUED | OUTPATIENT
Start: 2019-12-31 | End: 2019-12-31

## 2019-12-31 RX ORDER — HYDROMORPHONE HYDROCHLORIDE 1 MG/ML
.3-.5 INJECTION, SOLUTION INTRAMUSCULAR; INTRAVENOUS; SUBCUTANEOUS EVERY 10 MIN PRN
Status: DISCONTINUED | OUTPATIENT
Start: 2019-12-31 | End: 2019-12-31 | Stop reason: HOSPADM

## 2019-12-31 RX ORDER — SODIUM CHLORIDE, SODIUM LACTATE, POTASSIUM CHLORIDE, CALCIUM CHLORIDE 600; 310; 30; 20 MG/100ML; MG/100ML; MG/100ML; MG/100ML
INJECTION, SOLUTION INTRAVENOUS CONTINUOUS
Status: DISCONTINUED | OUTPATIENT
Start: 2019-12-31 | End: 2019-12-31 | Stop reason: HOSPADM

## 2019-12-31 RX ORDER — PROPOFOL 10 MG/ML
INJECTION, EMULSION INTRAVENOUS PRN
Status: DISCONTINUED | OUTPATIENT
Start: 2019-12-31 | End: 2019-12-31

## 2019-12-31 RX ORDER — KETOROLAC TROMETHAMINE 30 MG/ML
INJECTION, SOLUTION INTRAMUSCULAR; INTRAVENOUS PRN
Status: DISCONTINUED | OUTPATIENT
Start: 2019-12-31 | End: 2019-12-31

## 2019-12-31 RX ORDER — ONDANSETRON 2 MG/ML
INJECTION INTRAMUSCULAR; INTRAVENOUS PRN
Status: DISCONTINUED | OUTPATIENT
Start: 2019-12-31 | End: 2019-12-31

## 2019-12-31 RX ORDER — ONDANSETRON 4 MG/1
4 TABLET, ORALLY DISINTEGRATING ORAL EVERY 30 MIN PRN
Status: DISCONTINUED | OUTPATIENT
Start: 2019-12-31 | End: 2019-12-31 | Stop reason: HOSPADM

## 2019-12-31 RX ORDER — ALBUTEROL SULFATE 0.83 MG/ML
2.5 SOLUTION RESPIRATORY (INHALATION) EVERY 4 HOURS PRN
Status: DISCONTINUED | OUTPATIENT
Start: 2019-12-31 | End: 2019-12-31 | Stop reason: HOSPADM

## 2019-12-31 RX ORDER — OXYCODONE AND ACETAMINOPHEN 5; 325 MG/1; MG/1
1 TABLET ORAL
Status: DISCONTINUED | OUTPATIENT
Start: 2019-12-31 | End: 2019-12-31 | Stop reason: HOSPADM

## 2019-12-31 RX ORDER — DEXAMETHASONE SODIUM PHOSPHATE 4 MG/ML
INJECTION, SOLUTION INTRA-ARTICULAR; INTRALESIONAL; INTRAMUSCULAR; INTRAVENOUS; SOFT TISSUE PRN
Status: DISCONTINUED | OUTPATIENT
Start: 2019-12-31 | End: 2019-12-31

## 2019-12-31 RX ORDER — NEOSTIGMINE METHYLSULFATE 1 MG/ML
VIAL (ML) INJECTION PRN
Status: DISCONTINUED | OUTPATIENT
Start: 2019-12-31 | End: 2019-12-31

## 2019-12-31 RX ORDER — FENTANYL CITRATE 0.05 MG/ML
25-50 INJECTION, SOLUTION INTRAMUSCULAR; INTRAVENOUS
Status: DISCONTINUED | OUTPATIENT
Start: 2019-12-31 | End: 2019-12-31 | Stop reason: HOSPADM

## 2019-12-31 RX ORDER — SODIUM CHLORIDE, SODIUM LACTATE, POTASSIUM CHLORIDE, CALCIUM CHLORIDE 600; 310; 30; 20 MG/100ML; MG/100ML; MG/100ML; MG/100ML
INJECTION, SOLUTION INTRAVENOUS CONTINUOUS PRN
Status: DISCONTINUED | OUTPATIENT
Start: 2019-12-31 | End: 2019-12-31

## 2019-12-31 RX ORDER — BUPIVACAINE HYDROCHLORIDE 2.5 MG/ML
INJECTION, SOLUTION INFILTRATION; PERINEURAL PRN
Status: DISCONTINUED | OUTPATIENT
Start: 2019-12-31 | End: 2019-12-31 | Stop reason: HOSPADM

## 2019-12-31 RX ORDER — GLYCOPYRROLATE 0.2 MG/ML
INJECTION, SOLUTION INTRAMUSCULAR; INTRAVENOUS PRN
Status: DISCONTINUED | OUTPATIENT
Start: 2019-12-31 | End: 2019-12-31

## 2019-12-31 RX ORDER — BUPIVACAINE HYDROCHLORIDE 2.5 MG/ML
INJECTION, SOLUTION EPIDURAL; INFILTRATION; INTRACAUDAL
Status: DISCONTINUED
Start: 2019-12-31 | End: 2019-12-31 | Stop reason: HOSPADM

## 2019-12-31 RX ORDER — MEPERIDINE HYDROCHLORIDE 25 MG/ML
12.5 INJECTION INTRAMUSCULAR; INTRAVENOUS; SUBCUTANEOUS
Status: DISCONTINUED | OUTPATIENT
Start: 2019-12-31 | End: 2019-12-31 | Stop reason: HOSPADM

## 2019-12-31 RX ORDER — NALOXONE HYDROCHLORIDE 0.4 MG/ML
.1-.4 INJECTION, SOLUTION INTRAMUSCULAR; INTRAVENOUS; SUBCUTANEOUS
Status: DISCONTINUED | OUTPATIENT
Start: 2019-12-31 | End: 2019-12-31 | Stop reason: HOSPADM

## 2019-12-31 RX ORDER — FENTANYL CITRATE 50 UG/ML
INJECTION, SOLUTION INTRAMUSCULAR; INTRAVENOUS PRN
Status: DISCONTINUED | OUTPATIENT
Start: 2019-12-31 | End: 2019-12-31

## 2019-12-31 RX ORDER — HEPARIN SODIUM 1000 [USP'U]/ML
INJECTION, SOLUTION INTRAVENOUS; SUBCUTANEOUS
Status: DISCONTINUED
Start: 2019-12-31 | End: 2019-12-31 | Stop reason: HOSPADM

## 2019-12-31 RX ORDER — ACETAMINOPHEN 650 MG/1
650 SUPPOSITORY RECTAL EVERY 4 HOURS PRN
Status: DISCONTINUED | OUTPATIENT
Start: 2019-12-31 | End: 2019-12-31 | Stop reason: HOSPADM

## 2019-12-31 RX ORDER — OXYCODONE AND ACETAMINOPHEN 5; 325 MG/1; MG/1
1-2 TABLET ORAL EVERY 4 HOURS PRN
Qty: 15 TABLET | Refills: 0 | Status: SHIPPED | OUTPATIENT
Start: 2019-12-31 | End: 2020-01-09

## 2019-12-31 RX ADMIN — GLYCOPYRROLATE 0.2 MG: 0.2 INJECTION, SOLUTION INTRAMUSCULAR; INTRAVENOUS at 07:52

## 2019-12-31 RX ADMIN — KETOROLAC TROMETHAMINE 30 MG: 30 INJECTION, SOLUTION INTRAMUSCULAR at 08:03

## 2019-12-31 RX ADMIN — PROPOFOL 150 MG: 10 INJECTION, EMULSION INTRAVENOUS at 07:26

## 2019-12-31 RX ADMIN — PROPOFOL 50 MCG/KG/MIN: 10 INJECTION, EMULSION INTRAVENOUS at 07:26

## 2019-12-31 RX ADMIN — PROPOFOL 50 MG: 10 INJECTION, EMULSION INTRAVENOUS at 07:28

## 2019-12-31 RX ADMIN — ROCURONIUM BROMIDE 30 MG: 10 INJECTION INTRAVENOUS at 07:26

## 2019-12-31 RX ADMIN — FENTANYL CITRATE 50 MCG: 50 INJECTION, SOLUTION INTRAMUSCULAR; INTRAVENOUS at 07:26

## 2019-12-31 RX ADMIN — GLYCOPYRROLATE 0.2 MG: 0.2 INJECTION, SOLUTION INTRAMUSCULAR; INTRAVENOUS at 08:03

## 2019-12-31 RX ADMIN — DEXAMETHASONE SODIUM PHOSPHATE 4 MG: 4 INJECTION, SOLUTION INTRA-ARTICULAR; INTRALESIONAL; INTRAMUSCULAR; INTRAVENOUS; SOFT TISSUE at 07:27

## 2019-12-31 RX ADMIN — BUPIVACAINE HYDROCHLORIDE 8 ML: 2.5 INJECTION, SOLUTION EPIDURAL; INFILTRATION; INTRACAUDAL; PERINEURAL at 08:02

## 2019-12-31 RX ADMIN — SODIUM CHLORIDE, POTASSIUM CHLORIDE, SODIUM LACTATE AND CALCIUM CHLORIDE: 600; 310; 30; 20 INJECTION, SOLUTION INTRAVENOUS at 08:05

## 2019-12-31 RX ADMIN — SODIUM CHLORIDE, POTASSIUM CHLORIDE, SODIUM LACTATE AND CALCIUM CHLORIDE: 600; 310; 30; 20 INJECTION, SOLUTION INTRAVENOUS at 07:20

## 2019-12-31 RX ADMIN — GLYCOPYRROLATE 0.2 MG: 0.2 INJECTION, SOLUTION INTRAMUSCULAR; INTRAVENOUS at 07:49

## 2019-12-31 RX ADMIN — ONDANSETRON 4 MG: 2 INJECTION INTRAMUSCULAR; INTRAVENOUS at 07:27

## 2019-12-31 RX ADMIN — LIDOCAINE HYDROCHLORIDE 40 MG: 20 INJECTION, SOLUTION INFILTRATION; PERINEURAL at 07:26

## 2019-12-31 RX ADMIN — NEOSTIGMINE METHYLSULFATE 4 MG: 1 INJECTION, SOLUTION INTRAVENOUS at 08:03

## 2019-12-31 ASSESSMENT — ENCOUNTER SYMPTOMS: ORTHOPNEA: 0

## 2019-12-31 ASSESSMENT — MIFFLIN-ST. JEOR: SCORE: 1445.86

## 2019-12-31 NOTE — DISCHARGE INSTRUCTIONS
Today you received Toradol, an antiinflammatory medication similar to Ibuprofen.  You should not take other antiinflammatory medication, such as Ibuprofen, Motrin, Advil, Aleve, Naprosyn, etc until 2:00PM.     Same Day Surgery Discharge Instructions for  Sedation and General Anesthesia       It's not unusual to feel dizzy, light-headed or faint for up to 24 hours after surgery or while taking pain medication.  If you have these symptoms: sit for a few minutes before standing and have someone assist you when you get up to walk or use the bathroom.      You should rest and relax for the next 24 hours. We recommend you make arrangements to have an adult stay with you for at least 24 hours after your discharge.  Avoid hazardous and strenuous activity.      DO NOT DRIVE any vehicle or operate mechanical equipment for 24 hours following the end of your surgery.  Even though you may feel normal, your reactions may be affected by the medication you have received.      Do not drink alcoholic beverages for 24 hours following surgery.       Slowly progress to your regular diet as you feel able. It's not unusual to feel nauseated and/or vomit after receiving anesthesia.  If you develop these symptoms, drink clear liquids (apple juice, ginger ale, broth, 7-up, etc. ) until you feel better.  If your nausea and vomiting persists for 24 hours, please notify your surgeon.        All narcotic pain medications, along with inactivity and anesthesia, can cause constipation. Drinking plenty of liquids and increasing fiber intake will help.      For any questions of a medical nature, call your surgeon.      Do not make important decisions for 24 hours.      If you had general anesthesia, you may have a sore throat for a couple of days related to the breathing tube used during surgery.  You may use Cepacol lozenges to help with this discomfort.  If it worsens or if you develop a fever, contact your surgeon.       If you feel your pain is  not well managed with the pain medications prescribed by your surgeon, please contact your surgeon's office to let them know so they can address your concerns.     [facility]  Discharge Instructions  Following D & C / Hysteroscopy    Activity  You may resume normal activities including lifting as needed.  It is permissible to climb stairs. You may drive after 24 hours as long as you are not taking narcotic pain pills.  Baths or showers are perfectly acceptable.      Vaginal Discharge  You may have some vaginal bleeding or discharge for about a week after procedure.  You may use tampons or pads.    Temperature  If you develop temperature elevations to over 101  Fahrenheit, your physician should be called immediately.    Diet  [place] or light diet is advisable the day of surgery.  If nausea persists, continue this diet.  If severe, call.    Follow-up  Make an appointment in 1-2 weeks if instructed to at: [phone]      HOME CARE FOLLOWING LAPAROSCOPY  [facility]  [phone]      Diet  You have no restrictions on your diet.  During the evening following surgery, drink plenty of fluids and eat a light supper.    Nausea  The anesthesia may produce some nausea.  If you feel nauseated try drinking fluids such as 7-Up, tea, or soup.     Discomfort  The amount of discomfort you can expect is very unpredictable.  If you have pain that cannot be controlled with Tylenol or with the prescription you may have received, you should notify your physician.  The following complaints are not uncommon and should not be cause for concern:  1. Abdominal tenderness; abdominal cramping.  2. Low backache or pain radiating to your shoulders, chest or back.  This is a result of the gas used to inflate your abdomen during surgery.  Lying flat in bed seems to help relieve this.   3. Sore throat for a day or two resulting from the anesthesia tube used during surgery.   4. Some bruising on your abdomen.      Drainage  You may expect a small amount of drainage from the incision on your abdomen and you may change the bandage when necessary.  You will also have a small amount of vaginal drainage for several days; this is normal and no cause for concern.  If excessive bleeding occurs, notify your physician.      If dye was used during your procedure, your urine will initially be bright blue. It will gradually return to yellow throughout the day. Drinking plenty of fluids will help to filter the dye from your urine.    Fever  A low grade fever (not over 101  Fahrenheit) is usual after this procedure.  Do not hesitate to notify your physician if your fever seems excessive.    Activity  Rest on the day of surgery then you may resume your normal activity, as tolerated. Avoid heavy lifting for one week.    You may shower.  Do not douche or use tampons.  If you also had a D&C, do not resume intercourse until bleeding has ceased.    Emergency Care  Contact your physician if you have any of these problems:   1.  A fever over 101  Fahrenheit   2.  A large amount of bleeding or drainage   3.  Severe pain    **If you have questions or concerns about your procedure,   call Dr. Epps at [phone]**

## 2019-12-31 NOTE — ANESTHESIA CARE TRANSFER NOTE
Patient: Pao Pereira    Procedure(s):  HYSTEROSCOPY, DIAGNOSTIC, WITH DILATION AND CURETTAGE OF UTERUS  LAPAROSCOPY, DIAGNOSTIC, WITH LYSIS OF ADHESIONS USING CO2 LASER, BILATERAL OVARIAN CYSTOSCOPY    Diagnosis: Cysts of both ovaries [N83.201, N83.202]  Diagnosis Additional Information: No value filed.    Anesthesia Type:   General, ETT     Note:  Airway :Face Mask  Patient transferred to:PACU  Handoff Report: Identifed the Patient, Identified the Reponsible Provider, Reviewed the pertinent medical history, Discussed the surgical course, Reviewed Intra-OP anesthesia mangement and issues during anesthesia, Set expectations for post-procedure period and Allowed opportunity for questions and acknowledgement of understanding      Vitals: (Last set prior to Anesthesia Care Transfer)    CRNA VITALS  12/31/2019 0747 - 12/31/2019 0821      12/31/2019             Resp Rate (observed):  (!) 3                Electronically Signed By: KADEEM Acevedo CRNA  December 31, 2019  8:21 AM

## 2019-12-31 NOTE — BRIEF OP NOTE
[facility] Brief Operative Note    Pre-operative diagnosis: Cysts of both ovaries [N83.201, N83.202]ENDOMETRIAL HYPERPLASIA   Post-operative diagnosis SAME, STAGE 3 ENDOMETRIOSIS   Procedure: HYSTEROSCOPY, ENDOMETRIAL CURETTAGE, LAPAROSCOPY, LASER LYSIS OF EXTENSIVE ADHESIONS, BILATERAL OVARIAN CYSTECTOMIES, LASER VAPORIZATION right eye ENDOMETRIOSIS   Surgeon(s): Surgeon(s) and Role:  Panel 1:     * Jd Epps MD - Primary  Panel 2:     * Jd Epps MD - Primary   Estimated blood loss: 5 mL    Specimens: ID Type Source Tests Collected by Time Destination   A : ENDOMETRIAL CURETTINGS Tissue Endometrium SURGICAL PATHOLOGY EXAM Jd Epps MD 12/31/2019  7:42 AM       Findings: STAGE 3 ENDOMETRIOSIS

## 2019-12-31 NOTE — ANESTHESIA POSTPROCEDURE EVALUATION
Patient: Pao Pereira    Procedure(s):  HYSTEROSCOPY, DIAGNOSTIC, WITH DILATION AND CURETTAGE OF UTERUS  LAPAROSCOPY, DIAGNOSTIC, WITH LYSIS OF ADHESIONS USING CO2 LASER, BILATERAL OVARIAN CYSTOSCOPY    Diagnosis:Cysts of both ovaries [N83.201, N83.202]  Diagnosis Additional Information: No value filed.    Anesthesia Type:  General, ETT    Note:  Anesthesia Post Evaluation    Patient location during evaluation: PACU  Patient participation: Able to fully participate in evaluation  Level of consciousness: awake  Pain management: adequate  Airway patency: patent  Cardiovascular status: acceptable  Respiratory status: acceptable  Hydration status: acceptable  PONV: controlled     Anesthetic complications: None          Last vitals:  Vitals:    12/31/19 0915 12/31/19 0930 12/31/19 0954   BP: 102/69 104/65 103/65   Pulse: (!) 40 (!) 43 (!) 45   Resp: 21 12 12   Temp:      SpO2: 99% 99% 99%         Electronically Signed By: Lauryn Armijo MD  December 31, 2019  1:50 PM

## 2019-12-31 NOTE — ANESTHESIA PREPROCEDURE EVALUATION
"Anesthesia Pre-Procedure Evaluation    Patient: Pao Pereira   MRN: [id number] : 1980          Preoperative Diagnosis: Cysts of both ovaries [N83.201, N83.202]    Procedure(s):  HYSTEROSCOPY, DIAGNOSTIC, WITH DILATION AND CURETTAGE OF UTERUS  LAPAROSCOPY, DIAGNOSTIC, WITH LYSIS OF ADHESIONS USING CO2 LASER    Past Medical History:   Diagnosis Date     Asthma      Encounter for IUD removal 2017    removed 2019     Endometriosis      Past Surgical History:   Procedure Laterality Date     BIOPSY      GYN       Anesthesia Evaluation     . Pt has not had prior anesthetic            ROS/MED HX    ENT/Pulmonary:     (+)Intermittent asthma , . .   (-) sleep apnea   Neurologic: Comment: Pelvic pain      Cardiovascular:        (-) BROWN, orthopnea/PND and syncope   METS/Exercise Tolerance:  >4 METS   Hematologic:         Musculoskeletal:         GI/Hepatic:        (-) GERD   Renal/Genitourinary:         Endo: Comment: Ovarian cysts, endometrial hyperplasia     (+) Obesity, .      Psychiatric:         Infectious Disease:         Malignancy:         Other: Comment: Pruritus, contact dermatitis                          Physical Exam      Airway   Mallampati: II  TM distance: >3 FB  Neck ROM: full    Dental   (+) caps    Cardiovascular   Rhythm and rate: regular      Pulmonary    breath sounds clear to auscultation            Lab Results   Component Value Date    WBC 6.9 2019    HGB 14.0 2019    HCT 41.9 2019     2019    TSH 3.04 2019       Preop Vitals  BP Readings from Last 3 Encounters:   12/10/19 132/70   19 126/78   19 124/81    Pulse Readings from Last 3 Encounters:   19 66   18 74      Resp Readings from Last 3 Encounters:   No data found for Resp    SpO2 Readings from Last 3 Encounters:   No data found for SpO2      Temp Readings from Last 1 Encounters:   No data found for Temp    Ht Readings from Last 1 Encounters:   12/10/19 1.575 m (5' 2\")    " "  Wt Readings from Last 1 Encounters:   12/10/19 83.5 kg (184 lb)    Estimated body mass index is 33.65 kg/m  as calculated from the following:    Height as of 12/10/19: 1.575 m (5' 2\").    Weight as of 12/10/19: 83.5 kg (184 lb).     Allergies   Allergen Reactions     Dust Mites      Nickel Rash     Social History     Tobacco Use     Smoking status: Never Smoker     Smokeless tobacco: Never Used   Substance Use Topics     Alcohol use: Yes     Alcohol/week: 1.0 standard drinks     Comment: Very rarely     Prior to Admission medications    Medication Sig Start Date End Date Taking? Authorizing Provider   betamethasone dipropionate (DIPROSONE) 0.05 % external ointment Apply topically twice a week On Saturday and Sunday 9/23/19  Yes Diallo Cortes MD   CASTOR OIL PO Apply topically every evening   Yes Reported, Patient   gentian violet 1 % external solution Paint eczematous lesions on hands before adding the Lidex oint 9/6/19  Yes Diallo Cortes MD   OVER-THE-COUNTER Take 1 Tablespoonful by mouth every morning GI Revive by [employer]   Yes Reported, Patient   OVER-THE-COUNTER Take 2 capsules by mouth At Bedtime MegaSporeBiotic by Microbiome   Yes Reported, Patient   OVER-THE-COUNTER Take 2-4 capsules by mouth daily Pro Omega by [employer]   Yes Reported, Patient   OVER-THE-COUNTER Apply topically 2 times daily Black Seed Cumin Oil   Yes Reported, Patient   tacrolimus (PROTOPIC) 0.1 % external ointment Apply topically 2 times daily From Monday to Friday 2 times daily on hands and other eczema lesions 9/20/19  Yes Diallo Cortes MD     Current Facility-Administered Medications Ordered in Epic   Medication Dose Route Frequency Last Rate Last Dose     PRE OP antibiotics NOT needed for this surgical procedure  1 each As instructed Continuous         No current [facility]-ordered outpatient medications on file.       no pre procedure antibiotic needed       No results for input(s): NA, POTASSIUM, CHLORIDE, " CO2, ANIONGAP, GLC, BUN, CR, THELMA in the last 51882 hours.  Recent Labs   Lab Test 11/25/19  1555   WBC 6.9   HGB 14.0        No results for input(s): ABO, RH in the last 18719 hours.  No results for input(s): TROPI in the last 05845 hours.  No results for input(s): PH, PCO2, PO2, HCO3 in the last 53185 hours.  No results for input(s): HCG in the last 24551 hours.  Recent Results (from the past 744 hour(s))   US Pelvic Limited    Narrative    US Pelvic Limited   Order #: [id number] Accession #: [id number]   Study Notes      Indy Thorpe on 12/10/2019  1:35 PM   Gynecological Ultrasound Report  Pelvic U/S - Transvaginal    [facility]  Referring Provider: Dr. Jd Epps  Sonographer: Indy Thorpe [facility]  Indication: Pelvic pain  LMP (mm/dd/yyyy): 11/28/19  History:   Gynecological Ultrasonography:   Uterus: anteverted  Size: 9.89 x 5.50 x 5.83cm.    Findings: Normal   Endometrium: Thickness total 15.11mm  Findings: Thick  Right Ovary: 8.51 x 6.71 x 6.65cm.    Findings: Right ovarian cyst with debris= 7.9x 5.7x 6cm  Left Ovary: 4.51 x 4.14 x 3.69cm.   Findings: Left ovarian cyst with debris= 4.1x 3.2x 3.3cm  Cul de Sac/Pouch of [place]: No FF      Impression: Endometrial hyperplasia, bilateral ovarian cysts  __Jd Epps MD_________________________________________________________________________  _______         Discussed here       No results found for this or any previous visit (from the past 4320 hour(s)).      RECENT LABS:       Anesthesia Plan      History & Physical Review  History and physical reviewed and following examination; no interval change.    ASA Status:  2 .        Plan for General and ETT   PONV prophylaxis:  Ondansetron (or other 5HT-3) and Dexamethasone or Solumedrol  Additional equipment: Videolaryngoscope Glidescope, propofol infusion       Postoperative Care      Consents  Anesthetic plan, risks, benefits and alternatives discussed with:  Patient..                  Lauryn Armijo MD

## 2019-12-31 NOTE — OP NOTE
Procedure Date: 12/31/2019      REASON FOR ADMISSION:  Endometrial hyperplasia, bilateral ovarian cysts.      OPERATIVE PROCEDURES:  Hysteroscopy, endometrial curettage, laparoscopy, laser lysis of extensive adhesions, bilateral ovarian cystectomy, laser vaporization of endometriosis.        POSTOPERATIVE DIAGNOSIS:  Stage III endometriosis.      OPERATIVE FINDINGS:  The patient had thickened endometrial tissue, but no polyps or fibroids.  Intraabdominally, the patient had stage III endometriosis with a completely frozen posterior cul-de-sac and bilateral endometriomas.  There was a 4 cm endometrioma opened, drained and lazed to the base on the left and an 8 cm endometrioma opened, drained and lazed on the right.  Both ovaries were densely adherent to the posterior cul-de-sac.  The fallopian tubes, while involved with the endometriosis, did not appear to be scarred.  The anterior cul-de-sac was clean.  The appendix was normal.  Upper abdomen exploration showed a normal liver and gallbladder.      OPERATIVE PROCEDURE:  After general anesthesia was induced, the patient was placed in the dorsal lithotomy position and prepped and draped in the usual fashion.  A Costa catheter was placed.  The cervix was grasped and endocervix carefully dilated.  The hysteroscope was placed.  The above findings were noted.  A thorough endometrial curettage was performed and all tissue submitted to the pathologist.  A uterine manipulator was placed.      Through a subumbilical incision, the Veress needle was placed and 3 liters of CO2 were insufflated.  The laparoscope, trocar and sheath were placed without incident.  A 5 mm left lower quadrant trocar was placed under direct vision.  The laser was brought in, and an extensive lysis of adhesions allowed us to free the posterior cul-de-sac and rotate the ovaries up and out of the pelvis.  Each endometrioma was opened, drained, irrigated and lazed to the base.  Hemostasis was attained with  bipolar cautery.  Areas of endometriosis and adhesions in the posterior cul-de-sac were selectively treated.  Copious irrigation was undertaken.  Upper abdomen exploration was unremarkable.  Over 2 liters of fluid were used to clear all of the irrigation and endometriotic fluid.  At the conclusion of the case, the pelvis was hemostatic and the ovaries were completely decompressed and out of the cul-de-sac.  All gas was exhausted.  Instruments were removed.  The incisions were closed with 3-0 Vicryl and Steri-Strips.  The estimated blood loss was 10 mL.  The patient tolerated this well and went to the recovery room.  She was given Toradol as needed at this time.      The patient will be discharged to home to return to the office in 2 weeks for a postoperative check.  We will present the option of 4 months of Lupron suppression followed by second look laparoscopy in May.  The patient was asked to call for any fever, chills, change in bowel or bladder function.         HALEY MAN JR, MD             D: 2019   T: 2019   MT:       Name:     EL SYLVESTER   MRN:      [id number]        Account:        [id number]   :      1980           Procedure Date: 2019      Document: [id number]

## 2020-01-02 ENCOUNTER — TELEPHONE (OUTPATIENT)
Dept: OBGYN | Facility: CLINIC | Age: 40
End: 2020-01-02

## 2020-01-02 LAB — COPATH REPORT: NORMAL

## 2020-01-02 NOTE — TELEPHONE ENCOUNTER
Patient informed that Namrata ADKINS will be covering for Dr. Epps.  Can make appointment for anytime 1/7-1/14. Pt verbalized understanding, in agreement with plan, and voiced no further questions.  Transferred to scheduling  Dunia Paula RN on 1/2/2020 at 12:38 PM

## 2020-01-02 NOTE — TELEPHONE ENCOUNTER
Pt had surg 12/31/20 with Dr. Epps, pt was told to follow up in 1-2 wks. Pt is wondering if she should be seen with Mich before his DELFINO (possibly an opening on 1/6/20) or is that too early and she should see a covering provider closer to 2 weeks

## 2020-01-08 NOTE — PROGRESS NOTES
balbir  SUBJECTIVE:                                                   Pao Pereira is a 39 year old female who presents to clinic today for the following health issue(s):  Patient presents with:  Post-op Visit: 19: Hysteroscopy, endometrial curettage, laparoscopy, laser lysis of extensive adhesions, bilateral ovarian cystectomy, laser vaporization of endometriosis        HPI:  Pt here today for her post op. She has 2 kids at home and has been doing a lot with them in her post op period. She has an inflamed LLQ incision. Afebrile, no pain. Bowel and bladder functions are normal.     Stage 3 endo. With 2 endometriomas, one on each ovary. Pathology benign. Planning Lupron 1 month, then recheck and do 3 more with 2nd look surgery in May with Dr. Epps.     She is planning on a 3rd child.     Patient's last menstrual period was 2019..     Patient is sexually active, .  Using none for contraception.    reports that she has never smoked. She has never used smokeless tobacco.    STD testing offered?  Declined    Health maintenance updated:  yes    Today's PHQ-2 Score:   PHQ-2 (  [employer]) 2020   Q1: Little interest or pleasure in doing things 0   Q2: Feeling down, depressed or hopeless 0   PHQ-2 Score 0   Q1: Little interest or pleasure in doing things -   Q2: Feeling down, depressed or hopeless -   PHQ-2 Score -     Today's PHQ-9 Score:   PHQ-9 SCORE 2019   PHQ-9 Total Score 0     Today's HOLGER-7 Score:   HOLGER-7 SCORE 2019   Total Score -   Total Score 0       Problem list and histories reviewed & adjusted, as indicated.  Additional history: as documented.    Patient Active Problem List   Diagnosis     Irritant contact dermatitis, unspecified trigger     Pruritus     Cysts of both ovaries     Endometriosis     Past Surgical History:   Procedure Laterality Date     BIOPSY      GYN     DILATION AND CURETTAGE, HYSTEROSCOPY DIAGNOSTIC, COMBINED N/A 2019    Procedure: HYSTEROSCOPY,  DIAGNOSTIC, WITH DILATION AND CURETTAGE OF UTERUS;  Surgeon: Jd Epps MD;  Location: [facility] OR     LASER CO2 LAPAROSCOPY DIAGNOSTIC, LYSIS ADHESIONS, COMBINED N/A 12/31/2019    Procedure: LAPAROSCOPY, DIAGNOSTIC, WITH LYSIS OF ADHESIONS USING CO2 LASER, BILATERAL OVARIAN CYSTOSCOPY;  Surgeon: Jd Epps MD;  Location: [facility] OR     NO HISTORY OF SURGERY        Social History     Tobacco Use     Smoking status: Never Smoker     Smokeless tobacco: Never Used   Substance Use Topics     Alcohol use: Yes     Alcohol/week: 1.0 standard drinks     Comment: Very rarely      Problem (# of Occurrences) Relation (Name,Age of Onset)    Bone Cancer (1) Maternal Uncle    Breast Cancer (2) Maternal Aunt, Other: MGGM    Cerebral aneurysm (1) Maternal Grandfather    Colon Cancer (2) Maternal Aunt, Paternal Aunt    Diabetes (1) Maternal Grandfather    Hypertension (1) Father    Kidney failure (1) Maternal Grandfather: was on dialysis    Myocardial Infarction (1) Maternal Grandfather    Thyroid Cancer (2) Maternal Grandmother, Maternal Aunt            Current Outpatient Medications   Medication Sig     UNABLE TO FIND MEDICATION NAME: Arnica  montaba Radux     UNABLE TO FIND MEDICATION NAME: staphysagria     betamethasone dipropionate (DIPROSONE) 0.05 % external ointment Apply topically twice a week On Saturday and Sunday (Patient not taking: Reported on 1/9/2020)     CASTOR OIL PO Apply topically every evening     gentian violet 1 % external solution Paint eczematous lesions on hands before adding the Lidex oint (Patient not taking: Reported on 1/9/2020)     OVER-THE-COUNTER Take 1 Tablespoonful by mouth every morning GI Revive by [employer]     OVER-THE-COUNTER Take 2 capsules by mouth At Bedtime MegaSporeBiotic by Microbiome     OVER-THE-COUNTER Take 2-4 capsules by mouth daily Pro Omega by [employer]     OVER-THE-COUNTER Apply topically 2 times daily Black Seed Cumin Oil     tacrolimus (PROTOPIC) 0.1 % external  "ointment Apply topically 2 times daily From Monday to Friday 2 times daily on hands and other eczema lesions (Patient not taking: Reported on 1/9/2020)     Current Facility-Administered Medications   Medication     [START ON 1/23/2020] leuprolide (LUPRON DEPOT) kit 3.75 mg     Allergies   Allergen Reactions     Dust Mites      Nickel Rash       ROS:  12 point review of systems negative other than symptoms noted below or in the HPI.  No urinary frequency or dysuria, bladder or kidney problems      OBJECTIVE:     /74   Pulse 72   Ht 1.562 m (5' 1.5\")   Wt 80.7 kg (178 lb)   LMP 12/24/2019   BMI 33.09 kg/m    Body mass index is 33.09 kg/m .    Exam:  Constitutional:  Appearance: Well nourished, well developed alert, in no acute distress  Skin: General Inspection:  No rashes present, no lesions present, no areas of discoloration. LLQ AND UMBILICAL INCISIONS WELL APPROXIMATED. NO EXUDATE. LLQ INCISION IS SLIGHTLY RED.  Neurologic:  Mental Status:  Oriented X3.  Normal strength and tone, sensory exam grossly normal, mentation intact and speech normal.    Psychiatric:  Mentation appears normal and affect normal/bright.     In-Clinic Test Results:  No results found for this or any previous visit (from the past 24 hour(s)).    ASSESSMENT/PLAN:                                                        ICD-10-CM    1. Endometriosis N80.9 leuprolide (LUPRON DEPOT) kit 3.75 mg       There are no Patient Instructions on file for this visit.    Healing nicely. Will order 1 month lupron and plan for injection in 2 weeks provided insurance coverage. Will see Dr. Epps in 1 month. M/R/B discussed with Lupron. Will call with issues. Avoid any twisting/bending to allow for incisions to fully heal.     KADEEM Burden [facility] FOR [facility]  "

## 2020-01-09 ENCOUNTER — OFFICE VISIT (OUTPATIENT)
Dept: OBGYN | Facility: CLINIC | Age: 40
End: 2020-01-09
Payer: COMMERCIAL

## 2020-01-09 VITALS
SYSTOLIC BLOOD PRESSURE: 122 MMHG | WEIGHT: 178 LBS | BODY MASS INDEX: 32.76 KG/M2 | HEIGHT: 62 IN | HEART RATE: 72 BPM | DIASTOLIC BLOOD PRESSURE: 74 MMHG

## 2020-01-09 DIAGNOSIS — N80.9 ENDOMETRIOSIS: Primary | ICD-10-CM

## 2020-01-09 PROCEDURE — 99024 POSTOP FOLLOW-UP VISIT: CPT | Performed by: NURSE PRACTITIONER

## 2020-01-09 ASSESSMENT — MIFFLIN-ST. JEOR: SCORE: 1427.71

## 2020-01-13 ENCOUNTER — TELEPHONE (OUTPATIENT)
Dept: OBGYN | Facility: CLINIC | Age: 40
End: 2020-01-13

## 2020-01-13 DIAGNOSIS — T81.41XA INFECTION OF SUPERFICIAL INCISIONAL SURGICAL SITE AFTER PROCEDURE, INITIAL ENCOUNTER: Primary | ICD-10-CM

## 2020-01-13 RX ORDER — CEFADROXIL 500 MG/1
500 CAPSULE ORAL 2 TIMES DAILY
Qty: 14 CAPSULE | Refills: 0 | Status: SHIPPED | OUTPATIENT
Start: 2020-01-13 | End: 2020-01-30

## 2020-01-13 NOTE — TELEPHONE ENCOUNTER
Patient still c/o tenderness and redness at incision site, and now as closed over and draining.  She emailed a photo of site to me and consulted with Dr. Carroll and he advised patient be put on antibiotics.  Prescription was sent to pharmacy.  Patient is fine with this plan and if no improvement will be seen.  URVASHI Beltran

## 2020-01-13 NOTE — TELEPHONE ENCOUNTER
Pt reports that she saw Namrata OCASIO last Thursday for her incision that was very inflamed and tender.     Surgery on 12/31-  Hysteroscopy, endometrial curettage, laparoscopy, laser lysis of extensive adhesions, bilateral ovarian cystectomy, laser vaporization of endometriosis    Pt was instructed to clean with [id number] drained a lot of yellow pus.   It now has sealed up and the pt is concerned that it remains inflamed and tender. It wont be able to drain now- keeping infection inside of her.    Will consult Serenity CARMONA

## 2020-01-13 NOTE — TELEPHONE ENCOUNTER
Patient saw Lynsey on 1/9/20. Left side incision is still red & tender. Please call patient with advice @ [phone]

## 2020-01-23 ENCOUNTER — ALLIED HEALTH/NURSE VISIT (OUTPATIENT)
Dept: NURSING | Facility: CLINIC | Age: 40
End: 2020-01-23
Payer: COMMERCIAL

## 2020-01-23 DIAGNOSIS — N80.9 ENDOMETRIOSIS: Primary | ICD-10-CM

## 2020-01-23 PROCEDURE — 96372 THER/PROPH/DIAG INJ SC/IM: CPT

## 2020-01-23 NOTE — NURSING NOTE
Clinic Administered Medication Documentation    MEDICATION LIST:   Injectable Medication Documentation    Patient was given LupronDepot 3.75 mg. Prior to medication administration, verified patients identity using patient s name and date of birth. Please see MAR and medication order for additional information. Patient instructed to remain in clinic for 15 minutes and report any adverse reaction to staff immediately .      Was entire vial of medication used? Yes  Vial/Syringe: Syringe  Expiration Date:  4/23/2022  Was this medication supplied by the patient? No     Pt tolerated the injection with minimal discomfort. She has agreed to remain in the clinic for approx 15 minutes following the injection.    Pt was discharged in stable condition.

## 2020-01-23 NOTE — PROGRESS NOTES
I spoke with the patient as I was notified by the triage nurse that the patient has been bleeding heavily for 2 days now. No cramping.     Soaking a tampon or pad every hour. Does not feel dizzy/lightheaded.     Will monitor. lupron given today. Call on Monday if still bleeding heavily. This menses does line up with her scheduled menses.

## 2020-01-30 ENCOUNTER — OFFICE VISIT (OUTPATIENT)
Dept: OBGYN | Facility: CLINIC | Age: 40
End: 2020-01-30
Payer: COMMERCIAL

## 2020-01-30 VITALS
TEMPERATURE: 97.7 F | WEIGHT: 178 LBS | HEART RATE: 64 BPM | HEIGHT: 62 IN | SYSTOLIC BLOOD PRESSURE: 112 MMHG | BODY MASS INDEX: 32.76 KG/M2 | DIASTOLIC BLOOD PRESSURE: 68 MMHG

## 2020-01-30 DIAGNOSIS — T81.41XA INFECTION OF SUPERFICIAL INCISIONAL SURGICAL SITE AFTER PROCEDURE, INITIAL ENCOUNTER: ICD-10-CM

## 2020-01-30 LAB
ERYTHROCYTE [DISTWIDTH] IN BLOOD BY AUTOMATED COUNT: 13.7 % (ref 10–15)
HCT VFR BLD AUTO: 38.7 % (ref 35–47)
HGB BLD-MCNC: 12.8 G/DL (ref 11.7–15.7)
MCH RBC QN AUTO: 29.6 PG (ref 26.5–33)
MCHC RBC AUTO-ENTMCNC: 33.1 G/DL (ref 31.5–36.5)
MCV RBC AUTO: 89 FL (ref 78–100)
PLATELET # BLD AUTO: 207 10E9/L (ref 150–450)
RBC # BLD AUTO: 4.33 10E12/L (ref 3.8–5.2)
WBC # BLD AUTO: 5.2 10E9/L (ref 4–11)

## 2020-01-30 PROCEDURE — 99024 POSTOP FOLLOW-UP VISIT: CPT | Performed by: NURSE PRACTITIONER

## 2020-01-30 PROCEDURE — 36415 COLL VENOUS BLD VENIPUNCTURE: CPT | Performed by: NURSE PRACTITIONER

## 2020-01-30 PROCEDURE — 85027 COMPLETE CBC AUTOMATED: CPT | Performed by: NURSE PRACTITIONER

## 2020-01-30 RX ORDER — CEFADROXIL 500 MG/1
500 CAPSULE ORAL 2 TIMES DAILY
Qty: 14 CAPSULE | Refills: 0 | Status: SHIPPED | OUTPATIENT
Start: 2020-01-30 | End: 2022-02-22

## 2020-01-30 ASSESSMENT — MIFFLIN-ST. JEOR: SCORE: 1427.71

## 2020-01-30 NOTE — PROGRESS NOTES
SUBJECTIVE:                                                   Pao Pereira is a 39 year old female who presents to clinic today for the following health issue(s):  Patient presents with:  Wound Check: here to have incision site looked at: hot to the touch, some pain, red-was recently treated with abx on .       HPI:  Pt here with concerns about her LLQ insicion still being red and warm. She has been increasing her activity to the point of going for a slow job the other day.     Her bleeding has stopped. No s.e. from Lupron. No fever/chills. No drainage from her incision.    No LMP recorded..     Patient is sexually active, .  Using none for contraception.    reports that she has never smoked. She has never used smokeless tobacco.    STD testing offered?  Declined    Health maintenance updated:  yes    Today's PHQ-2 Score:   PHQ-2 (  [employer]) 2020   Q1: Little interest or pleasure in doing things 0   Q2: Feeling down, depressed or hopeless 0   PHQ-2 Score 0   Q1: Little interest or pleasure in doing things -   Q2: Feeling down, depressed or hopeless -   PHQ-2 Score -     Today's PHQ-9 Score:   PHQ-9 SCORE 2019   PHQ-9 Total Score 0     Today's HOLGER-7 Score:   HOLGER-7 SCORE 2019   Total Score -   Total Score 0       Problem list and histories reviewed & adjusted, as indicated.  Additional history: as documented.    Patient Active Problem List   Diagnosis     Irritant contact dermatitis, unspecified trigger     Pruritus     Cysts of both ovaries     Endometriosis     Past Surgical History:   Procedure Laterality Date     BIOPSY      GYN     DILATION AND CURETTAGE, HYSTEROSCOPY DIAGNOSTIC, COMBINED N/A 2019    Procedure: HYSTEROSCOPY, DIAGNOSTIC, WITH DILATION AND CURETTAGE OF UTERUS;  Surgeon: Jd Epps MD;  Location:  OR     LASER CO2 LAPAROSCOPY DIAGNOSTIC, LYSIS ADHESIONS, COMBINED N/A 2019    Procedure: LAPAROSCOPY, DIAGNOSTIC, WITH LYSIS OF ADHESIONS USING CO2  LASER, BILATERAL OVARIAN CYSTOSCOPY;  Surgeon: Jd Epps MD;  Location: [facility] OR     NO HISTORY OF SURGERY        Social History     Tobacco Use     Smoking status: Never Smoker     Smokeless tobacco: Never Used   Substance Use Topics     Alcohol use: Yes     Alcohol/week: 1.0 standard drinks     Comment: Very rarely      Problem (# of Occurrences) Relation (Name,Age of Onset)    Bone Cancer (1) Maternal Uncle    Breast Cancer (2) Maternal Aunt, Other: MGGM    Cerebral aneurysm (1) Maternal Grandfather    Colon Cancer (2) Maternal Aunt, Paternal Aunt    Diabetes (1) Maternal Grandfather    Hypertension (1) Father    Kidney failure (1) Maternal Grandfather: was on dialysis    Myocardial Infarction (1) Maternal Grandfather    Thyroid Cancer (2) Maternal Grandmother, Maternal Aunt            Current Outpatient Medications   Medication Sig     cefadroxil (DURICEF) 500 MG capsule Take 1 capsule (500 mg) by mouth 2 times daily     OVER-THE-COUNTER Take 1 Tablespoonful by mouth every morning GI Revive by [place] Free Classifieds     OVER-THE-COUNTER Take 2 capsules by mouth At Bedtime MegaSporeBiotic by Microbiome     OVER-THE-COUNTER Take 2-4 capsules by mouth daily Pro Omega by [place] Naturals     OVER-THE-COUNTER Apply topically 2 times daily Black Seed Cumin Oil     betamethasone dipropionate (DIPROSONE) 0.05 % external ointment Apply topically twice a week On Saturday and Sunday (Patient not taking: Reported on 1/9/2020)     CASTOR OIL PO Apply topically every evening     gentian violet 1 % external solution Paint eczematous lesions on hands before adding the Lidex oint (Patient not taking: Reported on 1/9/2020)     tacrolimus (PROTOPIC) 0.1 % external ointment Apply topically 2 times daily From Monday to Friday 2 times daily on hands and other eczema lesions (Patient not taking: Reported on 1/9/2020)     UNABLE TO FIND MEDICATION NAME: Arnica  montaba Radux     UNABLE TO FIND MEDICATION NAME: staphysagria     Current  "Facility-Administered Medications   Medication     leuprolide (LUPRON DEPOT) kit 3.75 mg     Allergies   Allergen Reactions     Dust Mites      Nickel Rash       ROS:  12 point review of systems negative other than symptoms noted below or in the HPI.  No urinary frequency or dysuria, bladder or kidney problems      OBJECTIVE:     /68   Pulse 64   Temp 97.7  F (36.5  C)   Ht 1.562 m (5' 1.5\")   Wt 80.7 kg (178 lb)   BMI 33.09 kg/m    Body mass index is 33.09 kg/m .    Exam:  Constitutional:  Appearance: Well nourished, well developed alert, in no acute distress  Skin: General Inspection:  No rashes present, no lesions present, no areas of discoloration.- LLQ incision is bruised with a slight redness surrounding it. Tender to touch.  Psychiatric:  Mentation appears normal and affect normal/bright.  No Pelvic Exam performed     In-Clinic Test Results:  Results for orders placed or performed in visit on 01/30/20 (from the past 24 hour(s))   CBC with platelets   Result Value Ref Range    WBC 5.2 4.0 - 11.0 10e9/L    RBC Count 4.33 3.8 - 5.2 10e12/L    Hemoglobin 12.8 11.7 - 15.7 g/dL    Hematocrit 38.7 35.0 - 47.0 %    MCV 89 78 - 100 fl    MCH 29.6 26.5 - 33.0 pg    MCHC 33.1 31.5 - 36.5 g/dL    RDW 13.7 10.0 - 15.0 %    Platelet Count 207 150 - 450 10e9/L       ASSESSMENT/PLAN:                                                        ICD-10-CM    1. Infection of superficial incisional surgical site after procedure, initial encounter T81.41XA CBC with platelets     cefadroxil (DURICEF) 500 MG capsule       There are no Patient Instructions on file for this visit.    Hematoma/fluid collection. CBC is unremarkable. Will cover with abx and she needs to cut back on her activity. No jogging.     KADEEM Burden [facility]  "

## 2020-01-31 ENCOUNTER — MYC MEDICAL ADVICE (OUTPATIENT)
Dept: OBGYN | Facility: CLINIC | Age: 40
End: 2020-01-31

## 2020-01-31 NOTE — TELEPHONE ENCOUNTER
Pt called wanting to know if provider recommendation would now be to send in a topical/antibiotic ointment for the incision site since it is now an open wound? Per the pt she bandaged up the incision and this am when she removed the bandage had both blood and a yellow discharge. Please consult and call pt back with further instruction.

## 2020-01-31 NOTE — TELEPHONE ENCOUNTER
Called the pt back- Discussed her wound care and using H2O2. She is still seeing some bloody and milky discharge. SHewas wondering if any thing else should be put on the wound. Recommended just doing the wound care as suggested by Namrata Bernal to do 2 times a day if they is a lot of drainage. Talked about using clean technique. May try using a 2x2 instead of bandaid so it has more air flow and not tight to allow drainage.   Assured the pt that she is not wrong in calling us and being concerned. Can call after hours triage over the weekend.  She will call back on Monday with an update.

## 2020-02-03 ENCOUNTER — MYC MEDICAL ADVICE (OUTPATIENT)
Dept: OBGYN | Facility: CLINIC | Age: 40
End: 2020-02-03

## 2020-02-03 DIAGNOSIS — N80.9 ENDOMETRIOSIS: Primary | ICD-10-CM

## 2020-02-04 ENCOUNTER — MYC MEDICAL ADVICE (OUTPATIENT)
Dept: OBGYN | Facility: CLINIC | Age: 40
End: 2020-02-04

## 2020-02-04 NOTE — TELEPHONE ENCOUNTER
Routing pt mychart to Namrata Liu NP  Do you have an earlier apt to fit her in?? Or is the 18th ok?    Namrata Noel RN on 2/4/2020 at 11:19 AM

## 2020-02-04 NOTE — TELEPHONE ENCOUNTER
Yes that is fine. Call if anything changes.     Please convey to her that I spoke with Dr. Epps about her infection and bleeding. He is not concerned. He said we need to give the Lupron some time to take effect. If she should have any lightheadedness/dizziness we do want to check a hemoglobin though.     Continue hydrogen peroxide on her open incision. Less activity. She can soak a cotton ball or q-tip in the hydrogen peroxide and a little bit of water and put it in the incision.

## 2020-02-04 NOTE — TELEPHONE ENCOUNTER
Lets have her come in for an ultrasound. It is ordered. i'd also like to see her incision that opened last week.

## 2020-10-05 ENCOUNTER — IMMUNIZATION (OUTPATIENT)
Dept: NURSING | Facility: CLINIC | Age: 40
End: 2020-10-05
Payer: COMMERCIAL

## 2020-10-05 PROCEDURE — 90471 IMMUNIZATION ADMIN: CPT

## 2020-10-05 PROCEDURE — 90686 IIV4 VACC NO PRSV 0.5 ML IM: CPT

## 2021-01-03 ENCOUNTER — HEALTH MAINTENANCE LETTER (OUTPATIENT)
Age: 41
End: 2021-01-03

## 2021-10-10 ENCOUNTER — HEALTH MAINTENANCE LETTER (OUTPATIENT)
Age: 41
End: 2021-10-10

## 2021-11-12 LAB
HEPATITIS B SURFACE ANTIGEN (EXTERNAL): NONREACTIVE
HIV1+2 AB SERPL QL IA: NONREACTIVE
RUBELLA ANTIBODY IGG (EXTERNAL): NORMAL

## 2022-01-29 ENCOUNTER — HEALTH MAINTENANCE LETTER (OUTPATIENT)
Age: 42
End: 2022-01-29

## 2022-02-07 ENCOUNTER — TRANSCRIBE ORDERS (OUTPATIENT)
Dept: ULTRASOUND IMAGING | Facility: HOSPITAL | Age: 42
End: 2022-02-07
Payer: COMMERCIAL

## 2022-02-07 ENCOUNTER — MEDICAL CORRESPONDENCE (OUTPATIENT)
Dept: HEALTH INFORMATION MANAGEMENT | Facility: CLINIC | Age: 42
End: 2022-02-07
Payer: COMMERCIAL

## 2022-02-07 ENCOUNTER — TELEPHONE (OUTPATIENT)
Dept: OBGYN | Facility: CLINIC | Age: 42
End: 2022-02-07
Payer: COMMERCIAL

## 2022-02-07 ENCOUNTER — TRANSFERRED RECORDS (OUTPATIENT)
Dept: HEALTH INFORMATION MANAGEMENT | Facility: CLINIC | Age: 42
End: 2022-02-07
Payer: COMMERCIAL

## 2022-02-07 DIAGNOSIS — O26.90 PREGNANCY RELATED CONDITION: Primary | ICD-10-CM

## 2022-02-07 NOTE — TELEPHONE ENCOUNTER
[facility] Call Center    Phone Message    May a detailed message be left on voicemail: yes     Reason for Call: Patient was referred from [facility] for a high risk pregnancy. Baby is measuring small. Patient is going to be monitored at [facility] but needs to establish care. Her intake appt is 2/22, however that is almost  2 weeks past her 24 wk krzysztof. Needs to be seen every two weeks. Patient wondering if it is okay to wait that long? Also on wait list. Please reach out to patient. Thank you!    Action Taken: Message routed to:  Other: [facility]    Travel Screening: Not Applicable

## 2022-02-09 ENCOUNTER — PRE VISIT (OUTPATIENT)
Dept: MATERNAL FETAL MEDICINE | Facility: HOSPITAL | Age: 42
End: 2022-02-09
Payer: COMMERCIAL

## 2022-02-09 NOTE — TELEPHONE ENCOUNTER
Advised that she can keep appointment as scheduled.  To call if any concerns.    Marissa Ricketts CNM You 30 minutes ago (2:37 PM)     DS    I think that seems reasonable since she is having a ton of MFM ultrasounds.     Message text      You  Marissa Ricketts CNM 59 minutes ago (2:08 PM)     CF    By early US (and looks like her working due date) her EDC is 6/3/22, making her 23 5/7.  By her 1/13/22 US, she is measuring 21 5/7   Marianna    Message text

## 2022-02-09 NOTE — TELEPHONE ENCOUNTER
Pt lives in [place].  Due to IUGR, [facility] recommended transferring care so she can deliver here.  She is scheduled for twice weekly BBs and NSTs through [facility].  Last seen by  on 1/13/22, and was scheduled for visit 2/14/22 when she was to do her glucose screening.  She cancelled that appointment when she scheduled her for 2/22/22.    Will route to CNM's to determine if she needs earlier visit

## 2022-02-10 ENCOUNTER — TRANSFERRED RECORDS (OUTPATIENT)
Dept: HEALTH INFORMATION MANAGEMENT | Facility: CLINIC | Age: 42
End: 2022-02-10
Payer: COMMERCIAL

## 2022-02-14 ENCOUNTER — OFFICE VISIT (OUTPATIENT)
Dept: MATERNAL FETAL MEDICINE | Facility: HOSPITAL | Age: 42
End: 2022-02-14
Attending: OBSTETRICS & GYNECOLOGY
Payer: COMMERCIAL

## 2022-02-14 ENCOUNTER — ANCILLARY PROCEDURE (OUTPATIENT)
Dept: ULTRASOUND IMAGING | Facility: HOSPITAL | Age: 42
End: 2022-02-14
Attending: OBSTETRICS & GYNECOLOGY
Payer: COMMERCIAL

## 2022-02-14 DIAGNOSIS — O09.529 AMA (ADVANCED MATERNAL AGE) MULTIGRAVIDA 35+, UNSPECIFIED TRIMESTER: ICD-10-CM

## 2022-02-14 DIAGNOSIS — O36.5990 PREGNANCY AFFECTED BY FETAL GROWTH RESTRICTION: Primary | ICD-10-CM

## 2022-02-14 DIAGNOSIS — O99.212 OBESITY AFFECTING PREGNANCY IN SECOND TRIMESTER: ICD-10-CM

## 2022-02-14 DIAGNOSIS — O26.92 PREGNANCY RELATED CONDITION IN SECOND TRIMESTER: ICD-10-CM

## 2022-02-14 PROCEDURE — 76820 UMBILICAL ARTERY ECHO: CPT | Mod: 26 | Performed by: OBSTETRICS & GYNECOLOGY

## 2022-02-14 PROCEDURE — 59025 FETAL NON-STRESS TEST: CPT

## 2022-02-14 PROCEDURE — 76820 UMBILICAL ARTERY ECHO: CPT

## 2022-02-14 PROCEDURE — 99207 PR NO CHARGE LOS: CPT | Performed by: OBSTETRICS & GYNECOLOGY

## 2022-02-14 PROCEDURE — 76815 OB US LIMITED FETUS(S): CPT | Mod: 26 | Performed by: OBSTETRICS & GYNECOLOGY

## 2022-02-14 PROCEDURE — 59025 FETAL NON-STRESS TEST: CPT | Mod: 26 | Performed by: OBSTETRICS & GYNECOLOGY

## 2022-02-14 NOTE — PROGRESS NOTES
Pao Pereira was seen for an ultrasound today at the [facility].      For the details of the ultrasound please see the report which can be found under the imaging tab.      Pao Aragon MD  , OB/GYN  Maternal-Fetal Medicine  [email or web address]  [phone] (Main MFM Office)  [phone] or [phone] (for 24 hour MFM questions)  [phone] (Pager)

## 2022-02-14 NOTE — NURSING NOTE
NST Performed due to severe FGR.  Dr. Aragon reviewed efm tracing. See NST/BPP Doc Flowsheet tab.

## 2022-02-18 ENCOUNTER — ANCILLARY PROCEDURE (OUTPATIENT)
Dept: ULTRASOUND IMAGING | Facility: HOSPITAL | Age: 42
End: 2022-02-18
Attending: OBSTETRICS & GYNECOLOGY
Payer: COMMERCIAL

## 2022-02-18 ENCOUNTER — OFFICE VISIT (OUTPATIENT)
Dept: MATERNAL FETAL MEDICINE | Facility: HOSPITAL | Age: 42
End: 2022-02-18
Attending: OBSTETRICS & GYNECOLOGY
Payer: COMMERCIAL

## 2022-02-18 DIAGNOSIS — O26.92 PREGNANCY RELATED CONDITION IN SECOND TRIMESTER: ICD-10-CM

## 2022-02-18 DIAGNOSIS — O36.5990 PREGNANCY AFFECTED BY FETAL GROWTH RESTRICTION: ICD-10-CM

## 2022-02-18 PROCEDURE — 76820 UMBILICAL ARTERY ECHO: CPT

## 2022-02-18 PROCEDURE — 76815 OB US LIMITED FETUS(S): CPT

## 2022-02-18 PROCEDURE — 76820 UMBILICAL ARTERY ECHO: CPT | Mod: 26 | Performed by: OBSTETRICS & GYNECOLOGY

## 2022-02-18 PROCEDURE — 59025 FETAL NON-STRESS TEST: CPT | Mod: 26 | Performed by: OBSTETRICS & GYNECOLOGY

## 2022-02-18 PROCEDURE — 99207 PR NO CHARGE LOS: CPT | Performed by: OBSTETRICS & GYNECOLOGY

## 2022-02-18 PROCEDURE — 76815 OB US LIMITED FETUS(S): CPT | Mod: 26 | Performed by: OBSTETRICS & GYNECOLOGY

## 2022-02-18 PROCEDURE — 59025 FETAL NON-STRESS TEST: CPT

## 2022-02-18 NOTE — NURSING NOTE
NST Performed due to severe fetal growth restriction.   reviewed efm tracing. See NST/BPP Doc Flowsheet tab.

## 2022-02-18 NOTE — PROGRESS NOTES
Please refer to ultrasound report under 'Imaging' Studies of 'Chart Review' tabs.    Luan Walker M.D.

## 2022-02-21 ENCOUNTER — OFFICE VISIT (OUTPATIENT)
Dept: MATERNAL FETAL MEDICINE | Facility: HOSPITAL | Age: 42
End: 2022-02-21
Attending: OBSTETRICS & GYNECOLOGY
Payer: COMMERCIAL

## 2022-02-21 ENCOUNTER — ANCILLARY PROCEDURE (OUTPATIENT)
Dept: ULTRASOUND IMAGING | Facility: HOSPITAL | Age: 42
End: 2022-02-21
Attending: OBSTETRICS & GYNECOLOGY
Payer: COMMERCIAL

## 2022-02-21 DIAGNOSIS — O36.5990 PREGNANCY AFFECTED BY FETAL GROWTH RESTRICTION: Primary | ICD-10-CM

## 2022-02-21 PROCEDURE — 99207 PR NO CHARGE LOS: CPT | Performed by: OBSTETRICS & GYNECOLOGY

## 2022-02-21 PROCEDURE — 76815 OB US LIMITED FETUS(S): CPT | Mod: 26 | Performed by: OBSTETRICS & GYNECOLOGY

## 2022-02-21 PROCEDURE — 59025 FETAL NON-STRESS TEST: CPT | Mod: 26 | Performed by: OBSTETRICS & GYNECOLOGY

## 2022-02-21 PROCEDURE — 76820 UMBILICAL ARTERY ECHO: CPT | Mod: 26 | Performed by: OBSTETRICS & GYNECOLOGY

## 2022-02-21 PROCEDURE — 59025 FETAL NON-STRESS TEST: CPT

## 2022-02-21 PROCEDURE — 76820 UMBILICAL ARTERY ECHO: CPT

## 2022-02-21 ASSESSMENT — ANXIETY QUESTIONNAIRES
4. TROUBLE RELAXING: NOT AT ALL
7. FEELING AFRAID AS IF SOMETHING AWFUL MIGHT HAPPEN: NOT AT ALL
6. BECOMING EASILY ANNOYED OR IRRITABLE: NOT AT ALL
3. WORRYING TOO MUCH ABOUT DIFFERENT THINGS: NOT AT ALL
1. FEELING NERVOUS, ANXIOUS, OR ON EDGE: NOT AT ALL
7. FEELING AFRAID AS IF SOMETHING AWFUL MIGHT HAPPEN: NOT AT ALL
5. BEING SO RESTLESS THAT IT IS HARD TO SIT STILL: NOT AT ALL
2. NOT BEING ABLE TO STOP OR CONTROL WORRYING: NOT AT ALL
GAD7 TOTAL SCORE: 0

## 2022-02-21 NOTE — NURSING NOTE
NST Performed due to severe FGR.  Dr. Rangel reviewed efm tracing. See NST/BPP Doc Flowsheet tab.

## 2022-02-21 NOTE — PROGRESS NOTES
"Please see \"Imaging\" tab under \"Chart Review\" for details of today's visit.    Laura Rangel    "

## 2022-02-22 ENCOUNTER — TELEPHONE (OUTPATIENT)
Dept: OBGYN | Facility: CLINIC | Age: 42
End: 2022-02-22
Payer: COMMERCIAL

## 2022-02-22 ENCOUNTER — VIRTUAL VISIT (OUTPATIENT)
Dept: OBGYN | Facility: CLINIC | Age: 42
End: 2022-02-22
Attending: ADVANCED PRACTICE MIDWIFE
Payer: COMMERCIAL

## 2022-02-22 DIAGNOSIS — O09.522 MULTIGRAVIDA OF ADVANCED MATERNAL AGE IN SECOND TRIMESTER: ICD-10-CM

## 2022-02-22 DIAGNOSIS — O36.5990 PREGNANCY AFFECTED BY FETAL GROWTH RESTRICTION: ICD-10-CM

## 2022-02-22 DIAGNOSIS — Z86.39 HISTORY OF HYPOTHYROIDISM: ICD-10-CM

## 2022-02-22 DIAGNOSIS — J45.20 MILD INTERMITTENT ASTHMA WITHOUT COMPLICATION: ICD-10-CM

## 2022-02-22 PROBLEM — L29.9 PRURITUS: Status: RESOLVED | Noted: 2018-07-19 | Resolved: 2022-02-22

## 2022-02-22 PROBLEM — J45.909 ASTHMA: Status: ACTIVE | Noted: 2022-02-22

## 2022-02-22 PROCEDURE — 99207 PR PRENATAL VISIT: CPT | Performed by: ADVANCED PRACTICE MIDWIFE

## 2022-02-22 RX ORDER — ASPIRIN 81 MG/1
81 TABLET, CHEWABLE ORAL DAILY
Status: ON HOLD | COMMUNITY
End: 2022-03-29

## 2022-02-22 ASSESSMENT — ENCOUNTER SYMPTOMS
COUGH DISTURBING SLEEP: 1
ABDOMINAL PAIN: 0
DYSPNEA ON EXERTION: 0
POSTURAL DYSPNEA: 0
HYPOTENSION: 0
WHEEZING: 0
CONSTIPATION: 0
DIARRHEA: 0
SMELL DISTURBANCE: 0
LIGHT-HEADEDNESS: 0
SLEEP DISTURBANCES DUE TO BREATHING: 0
ORTHOPNEA: 0
JAUNDICE: 0
COUGH: 1
LEG PAIN: 0
SYNCOPE: 0
VOMITING: 0
DIFFICULTY URINATING: 0
FLANK PAIN: 0
RECTAL PAIN: 0
HEARTBURN: 1
BLOATING: 0
SINUS CONGESTION: 1
SNORES LOUDLY: 1
EXERCISE INTOLERANCE: 0
HOARSE VOICE: 0
BOWEL INCONTINENCE: 0
SHORTNESS OF BREATH: 1
NECK MASS: 0
DYSURIA: 0
HYPERTENSION: 0
SINUS PAIN: 0
TROUBLE SWALLOWING: 0
SORE THROAT: 0
BLOOD IN STOOL: 0
HEMATURIA: 0
HEMOPTYSIS: 0
NAUSEA: 1
PALPITATIONS: 0
SPUTUM PRODUCTION: 1
TASTE DISTURBANCE: 0

## 2022-02-22 ASSESSMENT — ANXIETY QUESTIONNAIRES: GAD7 TOTAL SCORE: 0

## 2022-02-22 NOTE — PROGRESS NOTES
"The patient has been notified of the following:      \"We have found that certain health care needs can be provided without the need for a face to face visit.  This service lets us provide the care you need with a phone conversation.       I will have full access to your [place] medical record during this entire phone call.   I will be taking notes for your medical record.      Since this is like an office visit, we will bill your insurance company for this service.       There are potential benefits and risks of telephone visits (e.g. limits to patient confidentiality) that differ from in-person visits.?  Confidentiality still applies for telephone services, and nobody will record the visit.  It is important to be in a quiet, private space that is free of distractions (including cell phone or other devices) during the visit.??      If during the course of the call I believe a telephone visit is not appropriate, you will not be charged for this service\"   Consent has been obtained for this service by care team member: Yes       Transfer of Care  SUBJECTIVE  41 year old woman presents to clinic for transfer of OB care appointment.    No LMP recorded. Patient is pregnant.  at 25w4d by Estimated Date of Delivery: Carter 3, 2022 based on 11 week ultrasound.  Pregnancy unplanned, but welcomed.  Reports normal fetal movement.  Denies cramping/contractions. Denies LOF, VB.     - Feels well overall.   - Pt was receiving prenatal care from [facility].  Initiated prenatal care at 11 weeks, has had 3 visit total.      - Reason for transfer to [facility] care, MFM and SIUGR  - [facility]   - After review of prenatal records, additional routine orders are recommended including Hep B surface antibody, Hep C  - Pre pregnancy BMI 40.   Pre-pregnancy Weight 216.  Height 61.5 in.     OTHER CONCERNS:    - Visit was changed to phone visit d/t patient illness.  Pt began experiencing " diarrhea and vomiting around 0000 last night, has somewhat improved but still occurring.  Has been trying to keep well-hydrated, was able to keep down a few blueberries.  Notes some abdominal cramping prior to BM, but denies any uterine cramping.     - Dx with SIUGR at 19 weeks   - Hx of  x 2, full term, no complications   - BMI 40, had early Hgb A1c wnl, but did not have early GCT   - Taking low dose ASA    - Current Medications  Current Outpatient Medications   Medication Sig Dispense Refill     aspirin (ASA) 81 MG chewable tablet Take 81 mg by mouth daily       PRENATAL VIT-FE FUMARATE-FA PO        UNABLE TO FIND MEDICATION NAME: IMMUNITY BOOSTER           - Co-morbids  Past Medical History:   Diagnosis Date     Asthma     no hospitalizations     Dyshidrotic hand dermatitis     no issues currently     Endometriosis      History of hypothyroidism     no meds currently     PERSONAL/SOCIAL HISTORY  Partner is involved,  Abel.   x 14 years.  Lives with their family.  Employment: Unemployed. Her job involves sedentary activity.  History of anxiety or depression : denies  Additional items: None    PSYCHIATRIC:  Denies mood changes.    PHQ-9 score:    PHQ-9 SCORE 2019   PHQ-9 Total Score 0     HOLGER-7 SCORE 2019   Total Score 0 (minimal anxiety) - 0 (minimal anxiety)   Total Score 0 0 0     Past History:  Her past medical history   Past Medical History:   Diagnosis Date     Asthma     no hospitalizations     Dyshidrotic hand dermatitis     no issues currently     Endometriosis      History of hypothyroidism     no meds currently     Her past pregnancies have been uncomplicated  Since her last LMP she denies use of alcohol, tobacco and street drugs.  HISTORY:  Family History   Problem Relation Age of Onset     Hypertension Father      Thyroid Cancer Maternal Grandmother      Myocardial Infarction Maternal Grandfather      Cerebral aneurysm Maternal Grandfather      Diabetes  Maternal Grandfather      Kidney failure Maternal Grandfather         was on dialysis     Thyroid Cancer Maternal Aunt      Breast Cancer Maternal Aunt      Colon Cancer Maternal Aunt      Bone Cancer Maternal Uncle      Colon Cancer Paternal Aunt      Breast Cancer Other         MGGM     Social History     Socioeconomic History     Marital status:      Spouse name: None     Number of children: None     Years of education: None     Highest education level: None   Occupational History     None   Tobacco Use     Smoking status: Never Smoker     Smokeless tobacco: Never Used   Substance and Sexual Activity     Alcohol use: Not Currently     Alcohol/week: 1.0 standard drink     Comment: Very rarely     Drug use: No     Sexual activity: Yes     Partners: Male   Other Topics Concern     Parent/sibling w/ CABG, MI or angioplasty before 65F 55M? Not Asked   Social History Narrative     None     Social Determinants of Health     Financial Resource Strain: Not on file   Food Insecurity: Not on file   Transportation Needs: Not on file   Physical Activity: Not on file   Stress: Not on file   Social Connections: Not on file   Intimate Partner Violence: Not on file   Housing Stability: Not on file       Allergies   Allergen Reactions     Dust Mites      Nickel Rash     ============================================  MEDICAL HISTORY  Past Medical History:   Diagnosis Date     Asthma     no hospitalizations     Dyshidrotic hand dermatitis     no issues currently     Endometriosis      History of hypothyroidism     no meds currently     Past Surgical History:   Procedure Laterality Date     BIOPSY      GYN     DILATION AND CURETTAGE, HYSTEROSCOPY DIAGNOSTIC, COMBINED N/A 12/31/2019    Procedure: HYSTEROSCOPY, DIAGNOSTIC, WITH DILATION AND CURETTAGE OF UTERUS;  Surgeon: Jd Epps MD;  Location: [facility] OR     LASER CO2 LAPAROSCOPY DIAGNOSTIC, LYSIS ADHESIONS, COMBINED N/A 12/31/2019    Procedure: LAPAROSCOPY, DIAGNOSTIC, WITH  LYSIS OF ADHESIONS USING CO2 LASER, BILATERAL OVARIAN CYSTOSCOPY;  Surgeon: Jd Epps MD;  Location: [facility] OR     OB History    Para Term  AB Living   4 2 2 0 1 2   SAB IAB Ectopic Multiple Live Births   1 0 0 0 2      # Outcome Date GA Lbr Amilcar/2nd Weight Sex Delivery Anes PTL Lv   4 Current            3 Term 10/03/14 40w0d  3.629 kg (8 lb) M    RADHA   2 Term 12 41w0d  3.175 kg (7 lb) F    RADHA   1 SAB               Obstetric Comments   Denies GDM, HTN, PPH, PPMD.   without issue.       Last pap 2018 NILM, HPV neg per records  I personally reviewed the past social/family/medical and surgical history on the date of service.     ROS: 10 point ROS neg other than the symptoms noted above in the HPI.    Objective  Sounds well, not in apparent distress.    Lab Results   Component Value Date    PAP NIL 2019      Assessment/Plan:  41 year old , 25w4d weeks of pregnancy with RENAE of Carter 3, 2022 by 11w us  BMI > 40  SIUGR  AMA     - Oriented to Practice, types of care, and how to reach a provider.  Pt prefers MD team  - Discussed the harms, benefits, side effects and alternative therapies for current prescribed and OTC medications. Patient was encouraged to start prenatal vitamins as tolerated.    - Reviewed use of triage nurse line and contacting the on-call provider after hours for an urgent need such as fever, vagina bleeding, bladder or vaginal infection, rupture of membranes,  or term labor.    - Pregnancy concerns to be addressed by provider at next OB visit include: GCT.  - All pt's questions discussed and answered.  Pt verbalized understanding of and agreement to plan of care.     - Encouraged increased PO hydration, rest, bland foods.  If symptoms persist or worsen, to call clinic.   - Continue monitoring with MFM as scheduled  - GCT and EOB education at next visit.  Did not see UC, GC/CT, Hep B antibody, or Hep in records.  - Continue scheduled prenatal  care, RTC in 1 week and prn if questions or concerns    KADEEM Castano CNM    Duration of call: 23 minutes    Answers for HPI/ROS submitted by the patient on 2/22/2022  HOLGER 7 TOTAL SCORE: 0  General Symptoms: No  Skin Symptoms: No  HENT Symptoms: Yes  EYE SYMPTOMS: No  HEART SYMPTOMS: Yes  LUNG SYMPTOMS: Yes  INTESTINAL SYMPTOMS: Yes  URINARY SYMPTOMS: Yes  GYNECOLOGIC SYMPTOMS: No  BREAST SYMPTOMS: No  SKELETAL SYMPTOMS: No  BLOOD SYMPTOMS: No  NERVOUS SYSTEM SYMPTOMS: No  MENTAL HEALTH SYMPTOMS: No  Ear pain: No  Ear discharge: No  Hearing loss: No  Tinnitus: No  Nosebleeds: Yes  Congestion: Yes  Sinus pain: No  Trouble swallowing: No   Voice hoarseness: No  Mouth sores: No  Sore throat: No  Tooth pain: No  Gum tenderness: No  Bleeding gums: No  Change in taste: No  Change in sense of smell: No  Dry mouth: No  Hearing aid used: No  Neck lump: No  Chest pain or pressure: No  Fast or irregular heartbeat: No  Pain in legs with walking: No  Trouble breathing while lying down: No  Fingers or toes appear blue: No  High blood pressure: No  Low blood pressure: No  Fainting: No  Murmurs: No  Pacemaker: No  Varicose veins: No  Edema or swelling: Yes  Wake up at night with shortness of breath: No  Light-headedness: No  Exercise intolerance: No  Cough: Yes  Sputum or phlegm: Yes  Coughing up blood: No  Difficulty breating or shortness of breath: Yes  Snoring: Yes  Wheezing: No  Difficulty breathing on exertion: No  Nighttime Cough: Yes  Difficulty breathing when lying flat: No  Heart burn or indigestion: Yes  Nausea: Yes  Vomiting: No  Abdominal pain: No  Bloating: No  Constipation: No  Diarrhea: No  Blood in stool: No  Black stools: No  Rectal or Anal pain: No  Fecal incontinence: No  Yellowing of skin or eyes: No  Vomit with blood: No  Change in stools: No  Trouble holding urine or incontinence: Yes  Pain or burning: No  Trouble starting or stopping: No  Increased frequency of urination: Yes  Blood in urine:  No  Decreased frequency of urination: No  Frequent nighttime urination: Yes  Flank pain: No  Difficulty emptying bladder: No

## 2022-02-22 NOTE — TELEPHONE ENCOUNTER
[facility] Call Center    Phone Message    May a detailed message be left on voicemail: yes     Reason for Call: Patient has an in clinic appointment today at 11:20 but isn't feeling to well.  She is having digestive issues and was throwing up through the night. Does not have a fever and is in no pain.  She is wondering if she should come to clinic for today's appointment.  Writer tried calling  to no available.  Please reach out to patient.    Action Taken: Message routed to:  [facility]    Travel Screening: Not Applicable

## 2022-02-22 NOTE — LETTER
Date:February 22, 2022      Patient was self referred, no letter generated. Do not send.        [facility] Health Information

## 2022-02-22 NOTE — LETTER
"2022       RE: Pao Pereira   [facility] [id number]     Dear Colleague,    Thank you for referring your patient, Pao Pereira, to the [facility] WOMEN'S CLINIC [place] at [facility]. Please see a copy of my visit note below.    The patient has been notified of the following:      \"We have found that certain health care needs can be provided without the need for a face to face visit.  This service lets us provide the care you need with a phone conversation.       I will have full access to your [place] medical record during this entire phone call.   I will be taking notes for your medical record.      Since this is like an office visit, we will bill your insurance company for this service.       There are potential benefits and risks of telephone visits (e.g. limits to patient confidentiality) that differ from in-person visits.?  Confidentiality still applies for telephone services, and nobody will record the visit.  It is important to be in a quiet, private space that is free of distractions (including cell phone or other devices) during the visit.??      If during the course of the call I believe a telephone visit is not appropriate, you will not be charged for this service\"   Consent has been obtained for this service by care team member: Yes       Transfer of Care  SUBJECTIVE  41 year old woman presents to clinic for transfer of OB care appointment.    No LMP recorded. Patient is pregnant.  at 25w4d by Estimated Date of Delivery: Carter 3, 2022 based on 11 week ultrasound.  Pregnancy unplanned, but welcomed.  Reports normal fetal movement.  Denies cramping/contractions. Denies LOF, VB.     - Feels well overall.   - Pt was receiving prenatal care from [facility].  Initiated prenatal care at 11 weeks, has had 3 visit total.      - Reason for transfer to [facility] care, MFM and SIUGR  - [facility] Epic [facility]   - After review of prenatal records, additional routine orders are recommended including Hep B surface antibody, Hep C  - Pre pregnancy BMI 40.   Pre-pregnancy Weight 216.  Height 61.5 in.     OTHER CONCERNS:    - Visit was changed to phone visit d/t patient illness.  Pt began experiencing diarrhea and vomiting around 0000 last night, has somewhat improved but still occurring.  Has been trying to keep well-hydrated, was able to keep down a few blueberries.  Notes some abdominal cramping prior to BM, but denies any uterine cramping.     - Dx with SIUGR at 19 weeks   - Hx of  x 2, full term, no complications   - BMI 40, had early Hgb A1c wnl, but did not have early GCT   - Taking low dose ASA    - Current Medications  Current Outpatient Medications   Medication Sig Dispense Refill     aspirin (ASA) 81 MG chewable tablet Take 81 mg by mouth daily       PRENATAL VIT-FE FUMARATE-FA PO        UNABLE TO FIND MEDICATION NAME: IMMUNITY BOOSTER           - Co-morbids  Past Medical History:   Diagnosis Date     Asthma     no hospitalizations     Dyshidrotic hand dermatitis     no issues currently     Endometriosis      History of hypothyroidism     no meds currently     PERSONAL/SOCIAL HISTORY  Partner is involved,  Abel.   x 14 years.  Lives with their family.  Employment: Unemployed. Her job involves sedentary activity.  History of anxiety or depression : denies  Additional items: None    PSYCHIATRIC:  Denies mood changes.    PHQ-9 score:    PHQ-9 SCORE 2019   PHQ-9 Total Score 0     HOLGER-7 SCORE 2019   Total Score 0 (minimal anxiety) - 0 (minimal anxiety)   Total Score 0 0 0     Past History:  Her past medical history   Past Medical History:   Diagnosis Date     Asthma     no hospitalizations     Dyshidrotic hand dermatitis     no issues currently     Endometriosis      History of hypothyroidism     no meds currently     Her past pregnancies have been  uncomplicated  Since her last LMP she denies use of alcohol, tobacco and street drugs.  HISTORY:  Family History   Problem Relation Age of Onset     Hypertension Father      Thyroid Cancer Maternal Grandmother      Myocardial Infarction Maternal Grandfather      Cerebral aneurysm Maternal Grandfather      Diabetes Maternal Grandfather      Kidney failure Maternal Grandfather         was on dialysis     Thyroid Cancer Maternal Aunt      Breast Cancer Maternal Aunt      Colon Cancer Maternal Aunt      Bone Cancer Maternal Uncle      Colon Cancer Paternal Aunt      Breast Cancer Other         MGGM     Social History     Socioeconomic History     Marital status:      Spouse name: None     Number of children: None     Years of education: None     Highest education level: None   Occupational History     None   Tobacco Use     Smoking status: Never Smoker     Smokeless tobacco: Never Used   Substance and Sexual Activity     Alcohol use: Not Currently     Alcohol/week: 1.0 standard drink     Comment: Very rarely     Drug use: No     Sexual activity: Yes     Partners: Male   Other Topics Concern     Parent/sibling w/ CABG, MI or angioplasty before 65F 55M? Not Asked   Social History Narrative     None     Social Determinants of Health     Financial Resource Strain: Not on file   Food Insecurity: Not on file   Transportation Needs: Not on file   Physical Activity: Not on file   Stress: Not on file   Social Connections: Not on file   Intimate Partner Violence: Not on file   Housing Stability: Not on file       Allergies   Allergen Reactions     Dust Mites      Nickel Rash     ============================================  MEDICAL HISTORY  Past Medical History:   Diagnosis Date     Asthma     no hospitalizations     Dyshidrotic hand dermatitis     no issues currently     Endometriosis      History of hypothyroidism     no meds currently     Past Surgical History:   Procedure Laterality Date     BIOPSY      GYN      DILATION AND CURETTAGE, HYSTEROSCOPY DIAGNOSTIC, COMBINED N/A 2019    Procedure: HYSTEROSCOPY, DIAGNOSTIC, WITH DILATION AND CURETTAGE OF UTERUS;  Surgeon: Jd Epps MD;  Location:  OR     LASER CO2 LAPAROSCOPY DIAGNOSTIC, LYSIS ADHESIONS, COMBINED N/A 2019    Procedure: LAPAROSCOPY, DIAGNOSTIC, WITH LYSIS OF ADHESIONS USING CO2 LASER, BILATERAL OVARIAN CYSTOSCOPY;  Surgeon: Jd Epps MD;  Location: [facility] OR     OB History    Para Term  AB Living   4 2 2 0 1 2   SAB IAB Ectopic Multiple Live Births   1 0 0 0 2      # Outcome Date GA Lbr Amilcar/2nd Weight Sex Delivery Anes PTL Lv   4 Current            3 Term 10/03/14 40w0d  3.629 kg (8 lb) M    RADHA   2 Term 12 41w0d  3.175 kg (7 lb) F    RADHA   1 SAB               Obstetric Comments   Denies GDM, HTN, PPH, PPMD.   without issue.       Last pap 2018 NILM, HPV neg per records  I personally reviewed the past social/family/medical and surgical history on the date of service.     ROS: 10 point ROS neg other than the symptoms noted above in the HPI.    Objective  Sounds well, not in apparent distress.    Lab Results   Component Value Date    PAP NIL 2019      Assessment/Plan:  41 year old , 25w4d weeks of pregnancy with RENAE of Carter 3, 2022 by 11w us  BMI > 40  SIUGR  AMA     - Oriented to Practice, types of care, and how to reach a provider.  Pt prefers MD team  - Discussed the harms, benefits, side effects and alternative therapies for current prescribed and OTC medications. Patient was encouraged to start prenatal vitamins as tolerated.    - Reviewed use of triage nurse line and contacting the on-call provider after hours for an urgent need such as fever, vagina bleeding, bladder or vaginal infection, rupture of membranes,  or term labor.    - Pregnancy concerns to be addressed by provider at next OB visit include: GCT.  - All pt's questions discussed and answered.  Pt verbalized  understanding of and agreement to plan of care.     - Encouraged increased PO hydration, rest, bland foods.  If symptoms persist or worsen, to call clinic.   - Continue monitoring with MFM as scheduled  - GCT and EOB education at next visit.  Did not see UC, GC/CT, Hep B antibody, or Hep in records.  - Continue scheduled prenatal care, RTC in 1 week and prn if questions or concerns    KADEEM Castano CNM    Duration of call: 23 minutes    Answers for HPI/ROS submitted by the patient on 2/22/2022  HOLGER 7 TOTAL SCORE: 0  General Symptoms: No  Skin Symptoms: No  HENT Symptoms: Yes  EYE SYMPTOMS: No  HEART SYMPTOMS: Yes  LUNG SYMPTOMS: Yes  INTESTINAL SYMPTOMS: Yes  URINARY SYMPTOMS: Yes  GYNECOLOGIC SYMPTOMS: No  BREAST SYMPTOMS: No  SKELETAL SYMPTOMS: No  BLOOD SYMPTOMS: No  NERVOUS SYSTEM SYMPTOMS: No  MENTAL HEALTH SYMPTOMS: No  Ear pain: No  Ear discharge: No  Hearing loss: No  Tinnitus: No  Nosebleeds: Yes  Congestion: Yes  Sinus pain: No  Trouble swallowing: No   Voice hoarseness: No  Mouth sores: No  Sore throat: No  Tooth pain: No  Gum tenderness: No  Bleeding gums: No  Change in taste: No  Change in sense of smell: No  Dry mouth: No  Hearing aid used: No  Neck lump: No  Chest pain or pressure: No  Fast or irregular heartbeat: No  Pain in legs with walking: No  Trouble breathing while lying down: No  Fingers or toes appear blue: No  High blood pressure: No  Low blood pressure: No  Fainting: No  Murmurs: No  Pacemaker: No  Varicose veins: No  Edema or swelling: Yes  Wake up at night with shortness of breath: No  Light-headedness: No  Exercise intolerance: No  Cough: Yes  Sputum or phlegm: Yes  Coughing up blood: No  Difficulty breating or shortness of breath: Yes  Snoring: Yes  Wheezing: No  Difficulty breathing on exertion: No  Nighttime Cough: Yes  Difficulty breathing when lying flat: No  Heart burn or indigestion: Yes  Nausea: Yes  Vomiting: No  Abdominal pain: No  Bloating: No  Constipation:  No  Diarrhea: No  Blood in stool: No  Black stools: No  Rectal or Anal pain: No  Fecal incontinence: No  Yellowing of skin or eyes: No  Vomit with blood: No  Change in stools: No  Trouble holding urine or incontinence: Yes  Pain or burning: No  Trouble starting or stopping: No  Increased frequency of urination: Yes  Blood in urine: No  Decreased frequency of urination: No  Frequent nighttime urination: Yes  Flank pain: No  Difficulty emptying bladder: No          Again, thank you for allowing me to participate in the care of your patient.      Sincerely,    KADEEM Castano CNM

## 2022-02-24 ENCOUNTER — OFFICE VISIT (OUTPATIENT)
Dept: MATERNAL FETAL MEDICINE | Facility: HOSPITAL | Age: 42
End: 2022-02-24
Attending: OBSTETRICS & GYNECOLOGY
Payer: COMMERCIAL

## 2022-02-24 ENCOUNTER — ANCILLARY PROCEDURE (OUTPATIENT)
Dept: ULTRASOUND IMAGING | Facility: HOSPITAL | Age: 42
End: 2022-02-24
Attending: OBSTETRICS & GYNECOLOGY
Payer: COMMERCIAL

## 2022-02-24 DIAGNOSIS — O36.5990 PREGNANCY AFFECTED BY FETAL GROWTH RESTRICTION: Primary | ICD-10-CM

## 2022-02-24 DIAGNOSIS — O36.5990 PREGNANCY AFFECTED BY FETAL GROWTH RESTRICTION: ICD-10-CM

## 2022-02-24 PROCEDURE — 76820 UMBILICAL ARTERY ECHO: CPT

## 2022-02-24 PROCEDURE — 76815 OB US LIMITED FETUS(S): CPT | Mod: 26 | Performed by: OBSTETRICS & GYNECOLOGY

## 2022-02-24 PROCEDURE — 76820 UMBILICAL ARTERY ECHO: CPT | Mod: 26 | Performed by: OBSTETRICS & GYNECOLOGY

## 2022-02-24 PROCEDURE — 59025 FETAL NON-STRESS TEST: CPT

## 2022-02-24 PROCEDURE — 99207 PR NO CHARGE LOS: CPT | Performed by: OBSTETRICS & GYNECOLOGY

## 2022-02-24 PROCEDURE — 59025 FETAL NON-STRESS TEST: CPT | Mod: 26 | Performed by: OBSTETRICS & GYNECOLOGY

## 2022-02-24 NOTE — NURSING NOTE
NST Performed due to fetal growth restriction.  Dr. Rangel reviewed efm tracing. See NST/BPP Doc Flowsheet tab.

## 2022-02-28 ENCOUNTER — OFFICE VISIT (OUTPATIENT)
Dept: MATERNAL FETAL MEDICINE | Facility: HOSPITAL | Age: 42
End: 2022-02-28
Attending: OBSTETRICS & GYNECOLOGY
Payer: COMMERCIAL

## 2022-02-28 ENCOUNTER — ANCILLARY PROCEDURE (OUTPATIENT)
Dept: ULTRASOUND IMAGING | Facility: HOSPITAL | Age: 42
End: 2022-02-28
Attending: OBSTETRICS & GYNECOLOGY
Payer: COMMERCIAL

## 2022-02-28 DIAGNOSIS — O36.5990 PREGNANCY AFFECTED BY FETAL GROWTH RESTRICTION: ICD-10-CM

## 2022-02-28 DIAGNOSIS — O36.5990 PREGNANCY AFFECTED BY FETAL GROWTH RESTRICTION: Primary | ICD-10-CM

## 2022-02-28 PROCEDURE — 76820 UMBILICAL ARTERY ECHO: CPT

## 2022-02-28 PROCEDURE — 59025 FETAL NON-STRESS TEST: CPT

## 2022-02-28 PROCEDURE — 59025 FETAL NON-STRESS TEST: CPT | Mod: 26 | Performed by: OBSTETRICS & GYNECOLOGY

## 2022-02-28 PROCEDURE — 76816 OB US FOLLOW-UP PER FETUS: CPT | Mod: 26 | Performed by: OBSTETRICS & GYNECOLOGY

## 2022-02-28 PROCEDURE — 99207 PR NO CHARGE LOS: CPT | Performed by: OBSTETRICS & GYNECOLOGY

## 2022-02-28 PROCEDURE — 76820 UMBILICAL ARTERY ECHO: CPT | Mod: 26 | Performed by: OBSTETRICS & GYNECOLOGY

## 2022-03-04 ENCOUNTER — OFFICE VISIT (OUTPATIENT)
Dept: MATERNAL FETAL MEDICINE | Facility: HOSPITAL | Age: 42
End: 2022-03-04
Attending: OBSTETRICS & GYNECOLOGY
Payer: COMMERCIAL

## 2022-03-04 ENCOUNTER — OFFICE VISIT (OUTPATIENT)
Dept: OBGYN | Facility: CLINIC | Age: 42
End: 2022-03-04
Attending: ADVANCED PRACTICE MIDWIFE
Payer: COMMERCIAL

## 2022-03-04 ENCOUNTER — ANCILLARY PROCEDURE (OUTPATIENT)
Dept: ULTRASOUND IMAGING | Facility: HOSPITAL | Age: 42
End: 2022-03-04
Attending: OBSTETRICS & GYNECOLOGY
Payer: COMMERCIAL

## 2022-03-04 VITALS
DIASTOLIC BLOOD PRESSURE: 90 MMHG | HEIGHT: 62 IN | WEIGHT: 242 LBS | HEART RATE: 60 BPM | SYSTOLIC BLOOD PRESSURE: 137 MMHG | BODY MASS INDEX: 44.53 KG/M2

## 2022-03-04 DIAGNOSIS — O36.5990 PREGNANCY AFFECTED BY FETAL GROWTH RESTRICTION: ICD-10-CM

## 2022-03-04 DIAGNOSIS — O26.00 EXCESSIVE WEIGHT GAIN AFFECTING PREGNANCY: ICD-10-CM

## 2022-03-04 DIAGNOSIS — O16.9 ELEVATED BLOOD PRESSURE AFFECTING PREGNANCY, ANTEPARTUM: ICD-10-CM

## 2022-03-04 DIAGNOSIS — O09.522 MULTIGRAVIDA OF ADVANCED MATERNAL AGE IN SECOND TRIMESTER: Primary | ICD-10-CM

## 2022-03-04 LAB
DEPRECATED CALCIDIOL+CALCIFEROL SERPL-MC: 36 UG/L (ref 20–75)
ERYTHROCYTE [DISTWIDTH] IN BLOOD BY AUTOMATED COUNT: 13.7 % (ref 10–15)
GLUCOSE 1H P 50 G GLC PO SERPL-MCNC: 134 MG/DL (ref 70–129)
HCT VFR BLD AUTO: 37.3 % (ref 35–47)
HCV AB SERPL QL IA: NONREACTIVE
HGB BLD-MCNC: 12.8 G/DL (ref 11.7–15.7)
MCH RBC QN AUTO: 31.6 PG (ref 26.5–33)
MCHC RBC AUTO-ENTMCNC: 34.3 G/DL (ref 31.5–36.5)
MCV RBC AUTO: 92 FL (ref 78–100)
PLATELET # BLD AUTO: 210 10E3/UL (ref 150–450)
RBC # BLD AUTO: 4.05 10E6/UL (ref 3.8–5.2)
T PALLIDUM AB SER QL: NONREACTIVE
WBC # BLD AUTO: 8.9 10E3/UL (ref 4–11)

## 2022-03-04 PROCEDURE — 99207 PR PRENATAL VISIT: CPT | Performed by: ADVANCED PRACTICE MIDWIFE

## 2022-03-04 PROCEDURE — 76820 UMBILICAL ARTERY ECHO: CPT | Mod: 26 | Performed by: OBSTETRICS & GYNECOLOGY

## 2022-03-04 PROCEDURE — 82306 VITAMIN D 25 HYDROXY: CPT | Performed by: ADVANCED PRACTICE MIDWIFE

## 2022-03-04 PROCEDURE — 86803 HEPATITIS C AB TEST: CPT | Performed by: ADVANCED PRACTICE MIDWIFE

## 2022-03-04 PROCEDURE — 59025 FETAL NON-STRESS TEST: CPT

## 2022-03-04 PROCEDURE — 76815 OB US LIMITED FETUS(S): CPT | Mod: 26 | Performed by: OBSTETRICS & GYNECOLOGY

## 2022-03-04 PROCEDURE — 90715 TDAP VACCINE 7 YRS/> IM: CPT

## 2022-03-04 PROCEDURE — 59025 FETAL NON-STRESS TEST: CPT | Mod: 26 | Performed by: OBSTETRICS & GYNECOLOGY

## 2022-03-04 PROCEDURE — 99207 PR NO CHARGE LOS: CPT | Performed by: OBSTETRICS & GYNECOLOGY

## 2022-03-04 PROCEDURE — 90471 IMMUNIZATION ADMIN: CPT

## 2022-03-04 PROCEDURE — 36415 COLL VENOUS BLD VENIPUNCTURE: CPT | Performed by: ADVANCED PRACTICE MIDWIFE

## 2022-03-04 PROCEDURE — 86780 TREPONEMA PALLIDUM: CPT | Performed by: ADVANCED PRACTICE MIDWIFE

## 2022-03-04 PROCEDURE — 87086 URINE CULTURE/COLONY COUNT: CPT | Performed by: ADVANCED PRACTICE MIDWIFE

## 2022-03-04 PROCEDURE — 250N000011 HC RX IP 250 OP 636

## 2022-03-04 PROCEDURE — G0463 HOSPITAL OUTPT CLINIC VISIT: HCPCS

## 2022-03-04 PROCEDURE — 82950 GLUCOSE TEST: CPT | Performed by: ADVANCED PRACTICE MIDWIFE

## 2022-03-04 PROCEDURE — 85027 COMPLETE CBC AUTOMATED: CPT | Performed by: ADVANCED PRACTICE MIDWIFE

## 2022-03-04 PROCEDURE — 76815 OB US LIMITED FETUS(S): CPT

## 2022-03-04 NOTE — LETTER
Date:March 6, 2022      Provider requested that no letter be sent. Do not send.       [facility]

## 2022-03-04 NOTE — LETTER
"3/4/2022       RE: Pao Pereira   [facility] [address]     Dear Colleague,    Thank you for referring your patient, Pao Pereira, to the [facility]OMEN[facility] [place] at [facility]. Please see a copy of my visit note below.     41 year old, , 27w0d, presents for routine prenatal appointment. Taking aspirin and PNV. EOB education provided today.  Accepts GCT, RPR, CBC, Vitamin D and Tdap today. I hr GTT. Will add urine culture, Hep C.     [facility] [place] and \"[place] jog[place]\" going to the gym every other day.    Gained 30lbs with her other two children. 215lb when she got pregnant , weight today 242 = 27 lb weight gain  TANYA at 25 weeks  Diagnosed with Severe FGR at 19 weeks, transferred to [facility] for care  22: 26/3 weeks, anterior placenta, AC <1%, EFW <1%, reactive NST, doppler WNL  Pao has opted to defer all further testing regarding etiology of the FGR until after the baby is born. She has transferred her care to [facility] in anticipation of delivery at  [facility]. We will continue with twice weekly NST, amniotic fluid and UA Doppler assessment and a repeat assessment of fetal growth has been scheduled here in 3 weeks.  Has an ultrasound at 3pm today    The patient presents with the following concerns:   -Having nausea daily. Not taking anything for it and not interested in trying medications at the time.  - Reports no HA, visual changes, or epigastric/ RUQ pain    PHQ-9 SCORE 2019   PHQ-9 Total Score 0     Education completed today includes breast feeding, [facility] hand out, contraception, counting movements, signs of pre-term labor, when to present to birthplace, post partum depression, GBS, getting enough iron, labor induction, nitrous oxide, doulas and vitamin K.  Birth preferences reviewed: Open to medication management  Labor support:      Feeding plans :  and Bottlefed   Contraception " planned:  Pao had difficulty conceiving after their second pregnancy, she is not sure what she might opt for.  The following labs were ordered today:       GCT, CBC w platelets, Vitamin D, Hep C and Anti-treponema  Water birth consent form was not given, not eligible  Blood type: O positive (Antibody screen negative)  TDAP  Was given.  A/P:  Encounter Diagnoses   Name Primary?     Multigravida of advanced maternal age in second trimester Yes     Severe fetal growth restriction      Orders Placed This Encounter   Procedures     TDAP VACCINE (Adacel, Boostrix)  [[id number]]     Glucose tolerance gest screen 1 hour     CBC with platelets     Treponema Abs w Reflex to RPR and Titer     Vitamin D Deficiency     Hepatitis C antibody       Physical Exam  General: 41 year-old female appears to be in no acute distress.  Head: symmetrical features  Eye: appears midline and aligned. No drainage and lesions. Sclera is white. Conjunctiva is pink.   Nose/Mouth: deferred due to mask.  Abdomen: Soft and non tender. No budging or masses. No organomegaly. Uterus is appropriate to dates. Fetal heart tone present.  Musculoskeletal: Gait is coordinated. Full range of motion.    Plan  - Provide EOB folder and education on handouts  - Reviewed why/how to contact provider if headache/visual changes/RUQ or epigastric pain, decreased fetal movement, vaginal bleeding, leakage of fluid or more than 4 contractions in an hour.  - Encourage increasing physical activity and making healthy food choices. Encouraged to reduce simple carbohydrates in her diet.  - Discuss healthy weight gain  - Continue biweekly fetal monitoring to fetal growth restriction  - Review blood pressure, repeat blood pressure in one week, will ask RN team to request this is done at her next M appointment. If elevated will do HELLP  Labs, orders placed.   -Encouraged Pao to get a home blood pressure cuff.   - Continue scheduled prenatal care     Reinaldo JUDDN, RN DNP WHNP  Student, completed the PFSH and ROS. I then acted as a scribe for CNM for the remainder of the visit.     I agree with the PFSH and ROS as completed by Reinaldo TANNER, RN DNP [employer] Student, except for changes made by me. The remainder of the encounter was performed by me and scribed by the [employer] student. The scribed note accurately reflects my personal services and decisions made by me.    Shandra Leo CNM, APRN                    Again, thank you for allowing me to participate in the care of your patient.      Sincerely,    Shandra Leo CNM

## 2022-03-04 NOTE — PROGRESS NOTES
" 41 year old, , 27w0d, presents for routine prenatal appointment. Taking aspirin and PNV. EOB education provided today.  Accepts GCT, RPR, CBC, Vitamin D and Tdap today. I hr GTT. Will add urine culture, Hep C.     [place] and \"[place] jogging\" going to the gym every other day.    Gained 30lbs with her other two children. 215lb when she got pregnant , weight today 242 = 27 lb weight gain  TANYA at 25 weeks  Diagnosed with Severe FGR at 19 weeks, transferred to [facility] for care  22: 26/3 weeks, anterior placenta, AC <1%, EFW <1%, reactive NST, doppler WNL  Pao has opted to defer all further testing regarding etiology of the FGR until after the baby is born. She has transferred her care to [facility] in anticipation of delivery at  [facility]. We will continue with twice weekly NST, amniotic fluid and UA Doppler assessment and a repeat assessment of fetal growth has been scheduled here in 3 weeks.  Has an ultrasound at 3pm today    The patient presents with the following concerns:   -Having nausea daily. Not taking anything for it and not interested in trying medications at the time.  - Reports no HA, visual changes, or epigastric/ RUQ pain    PHQ-9 SCORE 2019   PHQ-9 Total Score 0     Education completed today includes breast feeding, [facility] hand out, contraception, counting movements, signs of pre-term labor, when to present to birthplace, post partum depression, GBS, getting enough iron, labor induction, nitrous oxide, doulas and vitamin K.  Birth preferences reviewed: Open to medication management  Labor support:      Feeding plans :  and Bottlefed   Contraception planned:  Poa had difficulty conceiving after their second pregnancy, she is not sure what she might opt for.  The following labs were ordered today:       GCT, CBC w platelets, Vitamin D, Hep C and Anti-treponema  Water birth consent form was not given, not eligible  Blood type: O positive (Antibody screen " negative)  TDAP  Was given.  A/P:  Encounter Diagnoses   Name Primary?     Multigravida of advanced maternal age in second trimester Yes     Severe fetal growth restriction      Orders Placed This Encounter   Procedures     TDAP VACCINE (Adacel, Boostrix)  [[id number]]     Glucose tolerance gest screen 1 hour     CBC with platelets     Treponema Abs w Reflex to RPR and Titer     Vitamin D Deficiency     Hepatitis C antibody       Physical Exam  General: 41 year-old female appears to be in no acute distress.  Head: symmetrical features  Eye: appears midline and aligned. No drainage and lesions. Sclera is white. Conjunctiva is pink.   Nose/Mouth: deferred due to mask.  Abdomen: Soft and non tender. No budging or masses. No organomegaly. Uterus is appropriate to dates. Fetal heart tone present.  Musculoskeletal: Gait is coordinated. Full range of motion.    Plan  - Provide EOB folder and education on handouts  - Reviewed why/how to contact provider if headache/visual changes/RUQ or epigastric pain, decreased fetal movement, vaginal bleeding, leakage of fluid or more than 4 contractions in an hour.  - Encourage increasing physical activity and making healthy food choices. Encouraged to reduce simple carbohydrates in her diet.  - Discuss healthy weight gain  - Continue biweekly fetal monitoring to fetal growth restriction  - Review blood pressure, repeat blood pressure in one week, will ask RN team to request this is done at her next M appointment. If elevated will do HELLP  Labs, orders placed.   -Encouraged Pao to get a home blood pressure cuff.   - Continue scheduled prenatal care     I, Reinaldo TANNER, RN LU[employer] LOYDNP Student, completed the PFSH and ROS. I then acted as a scribe for CNM for the remainder of the visit.     I agree with the PFSH and ROS as completed by Reinaldo TANNER, RN DNP WHNP Student, except for changes made by me. The remainder of the encounter was performed by me and scribed by the WHNP student. The  scribed note accurately reflects my personal services and decisions made by me.    Shandra Leo, KELLEN, APRN

## 2022-03-04 NOTE — PROGRESS NOTES
"Please see \"Imaging\" tab under Chart Review for full details.    [place] MD  Maternal Fetal Medicine    "

## 2022-03-05 PROBLEM — O16.9 ELEVATED BLOOD PRESSURE AFFECTING PREGNANCY, ANTEPARTUM: Status: ACTIVE | Noted: 2022-03-05

## 2022-03-05 PROBLEM — O26.00 EXCESSIVE WEIGHT GAIN AFFECTING PREGNANCY: Status: ACTIVE | Noted: 2022-03-05

## 2022-03-05 LAB — BACTERIA UR CULT: NO GROWTH

## 2022-03-07 ENCOUNTER — OFFICE VISIT (OUTPATIENT)
Dept: MATERNAL FETAL MEDICINE | Facility: HOSPITAL | Age: 42
End: 2022-03-07
Attending: OBSTETRICS & GYNECOLOGY
Payer: COMMERCIAL

## 2022-03-07 ENCOUNTER — TELEPHONE (OUTPATIENT)
Dept: OBGYN | Facility: CLINIC | Age: 42
End: 2022-03-07
Payer: COMMERCIAL

## 2022-03-07 ENCOUNTER — ANCILLARY PROCEDURE (OUTPATIENT)
Dept: ULTRASOUND IMAGING | Facility: HOSPITAL | Age: 42
End: 2022-03-07
Attending: OBSTETRICS & GYNECOLOGY
Payer: COMMERCIAL

## 2022-03-07 ENCOUNTER — HOSPITAL ENCOUNTER (INPATIENT)
Facility: CLINIC | Age: 42
LOS: 22 days | Discharge: HOME | End: 2022-03-29
Attending: OBSTETRICS & GYNECOLOGY | Admitting: OBSTETRICS & GYNECOLOGY
Payer: COMMERCIAL

## 2022-03-07 VITALS — SYSTOLIC BLOOD PRESSURE: 145 MMHG | HEART RATE: 63 BPM | DIASTOLIC BLOOD PRESSURE: 88 MMHG

## 2022-03-07 DIAGNOSIS — R73.09 IMPAIRED GLUCOSE TOLERANCE TEST: Primary | ICD-10-CM

## 2022-03-07 DIAGNOSIS — Z98.891 S/P PRIMARY LOW TRANSVERSE C-SECTION: Primary | ICD-10-CM

## 2022-03-07 DIAGNOSIS — O36.5990 PREGNANCY AFFECTED BY FETAL GROWTH RESTRICTION: ICD-10-CM

## 2022-03-07 DIAGNOSIS — O36.5990 PREGNANCY AFFECTED BY FETAL GROWTH RESTRICTION: Primary | ICD-10-CM

## 2022-03-07 PROBLEM — O14.90 PREECLAMPSIA: Status: ACTIVE | Noted: 2022-03-07

## 2022-03-07 PROBLEM — Z36.89 ENCOUNTER FOR TRIAGE IN PREGNANT PATIENT: Status: ACTIVE | Noted: 2022-03-07

## 2022-03-07 LAB
ABO/RH(D): NORMAL
ABO/RH(D): NORMAL
ALT SERPL W P-5'-P-CCNC: 78 U/L (ref 0–50)
ALT SERPL W P-5'-P-CCNC: 79 U/L (ref 0–50)
ALT SERPL W P-5'-P-CCNC: 86 U/L (ref 0–50)
ANTIBODY SCREEN: NEGATIVE
ANTIBODY SCREEN: NEGATIVE
AST SERPL W P-5'-P-CCNC: 31 U/L (ref 0–45)
AST SERPL W P-5'-P-CCNC: 33 U/L (ref 0–45)
AST SERPL W P-5'-P-CCNC: 36 U/L (ref 0–45)
CREAT SERPL-MCNC: 0.61 MG/DL (ref 0.52–1.04)
CREAT SERPL-MCNC: 0.62 MG/DL (ref 0.52–1.04)
CREAT SERPL-MCNC: 0.64 MG/DL (ref 0.52–1.04)
CREAT UR-MCNC: 30 MG/DL
CREAT UR-MCNC: 52 MG/DL
ERYTHROCYTE [DISTWIDTH] IN BLOOD BY AUTOMATED COUNT: 13.4 % (ref 10–15)
ERYTHROCYTE [DISTWIDTH] IN BLOOD BY AUTOMATED COUNT: 13.5 % (ref 10–15)
GFR SERPL CREATININE-BSD FRML MDRD: >90 ML/MIN/1.73M2
HCT VFR BLD AUTO: 37 % (ref 35–47)
HCT VFR BLD AUTO: 37 % (ref 35–47)
HGB BLD-MCNC: 12.6 G/DL (ref 11.7–15.7)
HGB BLD-MCNC: 12.9 G/DL (ref 11.7–15.7)
MCH RBC QN AUTO: 31 PG (ref 26.5–33)
MCH RBC QN AUTO: 31.5 PG (ref 26.5–33)
MCHC RBC AUTO-ENTMCNC: 34.1 G/DL (ref 31.5–36.5)
MCHC RBC AUTO-ENTMCNC: 34.9 G/DL (ref 31.5–36.5)
MCV RBC AUTO: 90 FL (ref 78–100)
MCV RBC AUTO: 91 FL (ref 78–100)
PLATELET # BLD AUTO: 221 10E3/UL (ref 150–450)
PLATELET # BLD AUTO: 238 10E3/UL (ref 150–450)
PROT UR-MCNC: <0.05 G/L
PROT UR-MCNC: <0.05 G/L
PROT/CREAT 24H UR: NORMAL MG/G{CREAT}
PROT/CREAT 24H UR: NORMAL MG/G{CREAT}
RBC # BLD AUTO: 4.07 10E6/UL (ref 3.8–5.2)
RBC # BLD AUTO: 4.1 10E6/UL (ref 3.8–5.2)
SARS-COV-2 RNA RESP QL NAA+PROBE: NEGATIVE
SPECIMEN EXPIRATION DATE: NORMAL
SPECIMEN EXPIRATION DATE: NORMAL
WBC # BLD AUTO: 8.4 10E3/UL (ref 4–11)
WBC # BLD AUTO: 9.6 10E3/UL (ref 4–11)

## 2022-03-07 PROCEDURE — 85027 COMPLETE CBC AUTOMATED: CPT | Performed by: STUDENT IN AN ORGANIZED HEALTH CARE EDUCATION/TRAINING PROGRAM

## 2022-03-07 PROCEDURE — 86850 RBC ANTIBODY SCREEN: CPT | Performed by: OBSTETRICS & GYNECOLOGY

## 2022-03-07 PROCEDURE — 250N000011 HC RX IP 250 OP 636: Performed by: STUDENT IN AN ORGANIZED HEALTH CARE EDUCATION/TRAINING PROGRAM

## 2022-03-07 PROCEDURE — 84460 ALANINE AMINO (ALT) (SGPT): CPT | Performed by: OBSTETRICS & GYNECOLOGY

## 2022-03-07 PROCEDURE — 36415 COLL VENOUS BLD VENIPUNCTURE: CPT | Performed by: STUDENT IN AN ORGANIZED HEALTH CARE EDUCATION/TRAINING PROGRAM

## 2022-03-07 PROCEDURE — 85027 COMPLETE CBC AUTOMATED: CPT | Performed by: OBSTETRICS & GYNECOLOGY

## 2022-03-07 PROCEDURE — 999N000127 HC STATISTIC PERIPHERAL IV START W US GUIDANCE

## 2022-03-07 PROCEDURE — 999N000040 HC STATISTIC CONSULT NO CHARGE VASC ACCESS

## 2022-03-07 PROCEDURE — 84450 TRANSFERASE (AST) (SGOT): CPT | Performed by: STUDENT IN AN ORGANIZED HEALTH CARE EDUCATION/TRAINING PROGRAM

## 2022-03-07 PROCEDURE — 86850 RBC ANTIBODY SCREEN: CPT | Performed by: STUDENT IN AN ORGANIZED HEALTH CARE EDUCATION/TRAINING PROGRAM

## 2022-03-07 PROCEDURE — 87635 SARS-COV-2 COVID-19 AMP PRB: CPT | Performed by: STUDENT IN AN ORGANIZED HEALTH CARE EDUCATION/TRAINING PROGRAM

## 2022-03-07 PROCEDURE — 84460 ALANINE AMINO (ALT) (SGPT): CPT | Performed by: STUDENT IN AN ORGANIZED HEALTH CARE EDUCATION/TRAINING PROGRAM

## 2022-03-07 PROCEDURE — 59025 FETAL NON-STRESS TEST: CPT | Mod: 26 | Performed by: OBSTETRICS & GYNECOLOGY

## 2022-03-07 PROCEDURE — 99207 PR NO CHARGE LOS: CPT | Performed by: OBSTETRICS & GYNECOLOGY

## 2022-03-07 PROCEDURE — 59025 FETAL NON-STRESS TEST: CPT

## 2022-03-07 PROCEDURE — 120N000002 HC R&B MED SURG/OB UMMC

## 2022-03-07 PROCEDURE — 87653 STREP B DNA AMP PROBE: CPT | Performed by: STUDENT IN AN ORGANIZED HEALTH CARE EDUCATION/TRAINING PROGRAM

## 2022-03-07 PROCEDURE — 258N000003 HC RX IP 258 OP 636: Performed by: STUDENT IN AN ORGANIZED HEALTH CARE EDUCATION/TRAINING PROGRAM

## 2022-03-07 PROCEDURE — 36415 COLL VENOUS BLD VENIPUNCTURE: CPT | Performed by: OBSTETRICS & GYNECOLOGY

## 2022-03-07 PROCEDURE — 76815 OB US LIMITED FETUS(S): CPT | Mod: 26 | Performed by: OBSTETRICS & GYNECOLOGY

## 2022-03-07 PROCEDURE — 86901 BLOOD TYPING SEROLOGIC RH(D): CPT | Performed by: OBSTETRICS & GYNECOLOGY

## 2022-03-07 PROCEDURE — 250N000013 HC RX MED GY IP 250 OP 250 PS 637: Performed by: STUDENT IN AN ORGANIZED HEALTH CARE EDUCATION/TRAINING PROGRAM

## 2022-03-07 PROCEDURE — 76820 UMBILICAL ARTERY ECHO: CPT

## 2022-03-07 PROCEDURE — 84450 TRANSFERASE (AST) (SGOT): CPT | Performed by: OBSTETRICS & GYNECOLOGY

## 2022-03-07 PROCEDURE — 82565 ASSAY OF CREATININE: CPT | Performed by: STUDENT IN AN ORGANIZED HEALTH CARE EDUCATION/TRAINING PROGRAM

## 2022-03-07 PROCEDURE — 84156 ASSAY OF PROTEIN URINE: CPT | Performed by: STUDENT IN AN ORGANIZED HEALTH CARE EDUCATION/TRAINING PROGRAM

## 2022-03-07 PROCEDURE — 84156 ASSAY OF PROTEIN URINE: CPT | Performed by: OBSTETRICS & GYNECOLOGY

## 2022-03-07 PROCEDURE — 86901 BLOOD TYPING SEROLOGIC RH(D): CPT | Performed by: STUDENT IN AN ORGANIZED HEALTH CARE EDUCATION/TRAINING PROGRAM

## 2022-03-07 PROCEDURE — G0463 HOSPITAL OUTPT CLINIC VISIT: HCPCS

## 2022-03-07 PROCEDURE — 76820 UMBILICAL ARTERY ECHO: CPT | Mod: 26 | Performed by: OBSTETRICS & GYNECOLOGY

## 2022-03-07 PROCEDURE — 82565 ASSAY OF CREATININE: CPT | Performed by: OBSTETRICS & GYNECOLOGY

## 2022-03-07 RX ORDER — MAGNESIUM SULFATE HEPTAHYDRATE 500 MG/ML
10 INJECTION, SOLUTION INTRAMUSCULAR; INTRAVENOUS
Status: DISCONTINUED | OUTPATIENT
Start: 2022-03-07 | End: 2022-03-27

## 2022-03-07 RX ORDER — MAGNESIUM SULFATE HEPTAHYDRATE 40 MG/ML
4 INJECTION, SOLUTION INTRAVENOUS
Status: DISCONTINUED | OUTPATIENT
Start: 2022-03-07 | End: 2022-03-09

## 2022-03-07 RX ORDER — METHYLERGONOVINE MALEATE 0.2 MG/ML
200 INJECTION INTRAVENOUS
Status: DISCONTINUED | OUTPATIENT
Start: 2022-03-07 | End: 2022-03-09

## 2022-03-07 RX ORDER — NALOXONE HYDROCHLORIDE 0.4 MG/ML
0.2 INJECTION, SOLUTION INTRAMUSCULAR; INTRAVENOUS; SUBCUTANEOUS
Status: DISCONTINUED | OUTPATIENT
Start: 2022-03-07 | End: 2022-03-09

## 2022-03-07 RX ORDER — ACETAMINOPHEN 325 MG/1
650 TABLET ORAL EVERY 4 HOURS PRN
Status: DISCONTINUED | OUTPATIENT
Start: 2022-03-07 | End: 2022-03-27

## 2022-03-07 RX ORDER — OXYTOCIN 10 [USP'U]/ML
10 INJECTION, SOLUTION INTRAMUSCULAR; INTRAVENOUS
Status: DISCONTINUED | OUTPATIENT
Start: 2022-03-07 | End: 2022-03-09

## 2022-03-07 RX ORDER — CALCIUM GLUCONATE 94 MG/ML
1 INJECTION, SOLUTION INTRAVENOUS
Status: DISCONTINUED | OUTPATIENT
Start: 2022-03-07 | End: 2022-03-10

## 2022-03-07 RX ORDER — MAGNESIUM SULFATE IN WATER 40 MG/ML
2 INJECTION, SOLUTION INTRAVENOUS CONTINUOUS
Status: DISPENSED | OUTPATIENT
Start: 2022-03-07 | End: 2022-03-09

## 2022-03-07 RX ORDER — ONDANSETRON 4 MG/1
4 TABLET, ORALLY DISINTEGRATING ORAL EVERY 6 HOURS PRN
Status: DISCONTINUED | OUTPATIENT
Start: 2022-03-07 | End: 2022-03-27 | Stop reason: HOSPADM

## 2022-03-07 RX ORDER — MAGNESIUM SULFATE HEPTAHYDRATE 40 MG/ML
2 INJECTION, SOLUTION INTRAVENOUS
Status: DISCONTINUED | OUTPATIENT
Start: 2022-03-07 | End: 2022-03-09

## 2022-03-07 RX ORDER — OXYTOCIN/0.9 % SODIUM CHLORIDE 30/500 ML
100-340 PLASTIC BAG, INJECTION (ML) INTRAVENOUS CONTINUOUS PRN
Status: DISCONTINUED | OUTPATIENT
Start: 2022-03-07 | End: 2022-03-09

## 2022-03-07 RX ORDER — DEXTROSE MONOHYDRATE 25 G/50ML
25-50 INJECTION, SOLUTION INTRAVENOUS
Status: DISCONTINUED | OUTPATIENT
Start: 2022-03-07 | End: 2022-03-14

## 2022-03-07 RX ORDER — LORAZEPAM 2 MG/ML
2 INJECTION INTRAMUSCULAR
Status: DISCONTINUED | OUTPATIENT
Start: 2022-03-07 | End: 2022-03-27

## 2022-03-07 RX ORDER — KETOROLAC TROMETHAMINE 30 MG/ML
30 INJECTION, SOLUTION INTRAMUSCULAR; INTRAVENOUS
Status: DISCONTINUED | OUTPATIENT
Start: 2022-03-07 | End: 2022-03-09

## 2022-03-07 RX ORDER — MAGNESIUM SULFATE HEPTAHYDRATE 40 MG/ML
2 INJECTION, SOLUTION INTRAVENOUS
Status: DISCONTINUED | OUTPATIENT
Start: 2022-03-07 | End: 2022-03-27

## 2022-03-07 RX ORDER — METOCLOPRAMIDE HYDROCHLORIDE 5 MG/ML
10 INJECTION INTRAMUSCULAR; INTRAVENOUS EVERY 6 HOURS PRN
Status: DISCONTINUED | OUTPATIENT
Start: 2022-03-07 | End: 2022-03-27 | Stop reason: HOSPADM

## 2022-03-07 RX ORDER — LORAZEPAM 2 MG/ML
2 INJECTION INTRAMUSCULAR
Status: DISCONTINUED | OUTPATIENT
Start: 2022-03-07 | End: 2022-03-07

## 2022-03-07 RX ORDER — CARBOPROST TROMETHAMINE 250 UG/ML
250 INJECTION, SOLUTION INTRAMUSCULAR
Status: DISCONTINUED | OUTPATIENT
Start: 2022-03-07 | End: 2022-03-09

## 2022-03-07 RX ORDER — LABETALOL HYDROCHLORIDE 5 MG/ML
20-80 INJECTION, SOLUTION INTRAVENOUS EVERY 10 MIN PRN
Status: DISCONTINUED | OUTPATIENT
Start: 2022-03-07 | End: 2022-03-29 | Stop reason: HOSPADM

## 2022-03-07 RX ORDER — TRANEXAMIC ACID 10 MG/ML
1 INJECTION, SOLUTION INTRAVENOUS EVERY 30 MIN PRN
Status: DISCONTINUED | OUTPATIENT
Start: 2022-03-07 | End: 2022-03-09

## 2022-03-07 RX ORDER — MISOPROSTOL 200 UG/1
400 TABLET ORAL
Status: DISCONTINUED | OUTPATIENT
Start: 2022-03-07 | End: 2022-03-09

## 2022-03-07 RX ORDER — SODIUM CHLORIDE, SODIUM LACTATE, POTASSIUM CHLORIDE, CALCIUM CHLORIDE 600; 310; 30; 20 MG/100ML; MG/100ML; MG/100ML; MG/100ML
10-125 INJECTION, SOLUTION INTRAVENOUS CONTINUOUS
Status: DISCONTINUED | OUTPATIENT
Start: 2022-03-07 | End: 2022-03-10

## 2022-03-07 RX ORDER — SODIUM CHLORIDE, SODIUM LACTATE, POTASSIUM CHLORIDE, CALCIUM CHLORIDE 600; 310; 30; 20 MG/100ML; MG/100ML; MG/100ML; MG/100ML
10-125 INJECTION, SOLUTION INTRAVENOUS CONTINUOUS
Status: DISCONTINUED | OUTPATIENT
Start: 2022-03-07 | End: 2022-03-09

## 2022-03-07 RX ORDER — IBUPROFEN 600 MG/1
600 TABLET, FILM COATED ORAL
Status: DISCONTINUED | OUTPATIENT
Start: 2022-03-07 | End: 2022-03-09

## 2022-03-07 RX ORDER — NALOXONE HYDROCHLORIDE 0.4 MG/ML
0.4 INJECTION, SOLUTION INTRAMUSCULAR; INTRAVENOUS; SUBCUTANEOUS
Status: DISCONTINUED | OUTPATIENT
Start: 2022-03-07 | End: 2022-03-09

## 2022-03-07 RX ORDER — LIDOCAINE 40 MG/G
CREAM TOPICAL
Status: DISCONTINUED | OUTPATIENT
Start: 2022-03-07 | End: 2022-03-07

## 2022-03-07 RX ORDER — PROCHLORPERAZINE MALEATE 10 MG
10 TABLET ORAL EVERY 6 HOURS PRN
Status: DISCONTINUED | OUTPATIENT
Start: 2022-03-07 | End: 2022-03-27 | Stop reason: HOSPADM

## 2022-03-07 RX ORDER — MISOPROSTOL 200 UG/1
800 TABLET ORAL
Status: DISCONTINUED | OUTPATIENT
Start: 2022-03-07 | End: 2022-03-09

## 2022-03-07 RX ORDER — HYDRALAZINE HYDROCHLORIDE 20 MG/ML
10 INJECTION INTRAMUSCULAR; INTRAVENOUS
Status: DISCONTINUED | OUTPATIENT
Start: 2022-03-07 | End: 2022-03-29 | Stop reason: HOSPADM

## 2022-03-07 RX ORDER — METOCLOPRAMIDE 10 MG/1
10 TABLET ORAL EVERY 6 HOURS PRN
Status: DISCONTINUED | OUTPATIENT
Start: 2022-03-07 | End: 2022-03-27 | Stop reason: HOSPADM

## 2022-03-07 RX ORDER — MAGNESIUM SULFATE HEPTAHYDRATE 40 MG/ML
4 INJECTION, SOLUTION INTRAVENOUS
Status: DISCONTINUED | OUTPATIENT
Start: 2022-03-07 | End: 2022-03-29 | Stop reason: HOSPADM

## 2022-03-07 RX ORDER — MAGNESIUM SULFATE HEPTAHYDRATE 500 MG/ML
10 INJECTION, SOLUTION INTRAMUSCULAR; INTRAVENOUS
Status: DISCONTINUED | OUTPATIENT
Start: 2022-03-07 | End: 2022-03-09

## 2022-03-07 RX ORDER — PROCHLORPERAZINE 25 MG
25 SUPPOSITORY, RECTAL RECTAL EVERY 12 HOURS PRN
Status: DISCONTINUED | OUTPATIENT
Start: 2022-03-07 | End: 2022-03-27 | Stop reason: HOSPADM

## 2022-03-07 RX ORDER — MAGNESIUM SULFATE HEPTAHYDRATE 40 MG/ML
4 INJECTION, SOLUTION INTRAVENOUS ONCE
Status: COMPLETED | OUTPATIENT
Start: 2022-03-07 | End: 2022-03-07

## 2022-03-07 RX ORDER — BETAMETHASONE SODIUM PHOSPHATE AND BETAMETHASONE ACETATE 3; 3 MG/ML; MG/ML
12 INJECTION, SUSPENSION INTRA-ARTICULAR; INTRALESIONAL; INTRAMUSCULAR; SOFT TISSUE EVERY 24 HOURS
Status: COMPLETED | OUTPATIENT
Start: 2022-03-07 | End: 2022-03-08

## 2022-03-07 RX ORDER — NICOTINE POLACRILEX 4 MG
15-30 LOZENGE BUCCAL
Status: DISCONTINUED | OUTPATIENT
Start: 2022-03-07 | End: 2022-03-14

## 2022-03-07 RX ORDER — OXYTOCIN/0.9 % SODIUM CHLORIDE 30/500 ML
340 PLASTIC BAG, INJECTION (ML) INTRAVENOUS CONTINUOUS PRN
Status: DISCONTINUED | OUTPATIENT
Start: 2022-03-07 | End: 2022-03-09

## 2022-03-07 RX ORDER — ONDANSETRON 2 MG/ML
4 INJECTION INTRAMUSCULAR; INTRAVENOUS EVERY 6 HOURS PRN
Status: DISCONTINUED | OUTPATIENT
Start: 2022-03-07 | End: 2022-03-27 | Stop reason: HOSPADM

## 2022-03-07 RX ADMIN — LABETALOL HYDROCHLORIDE 20 MG: 5 INJECTION, SOLUTION INTRAVENOUS at 21:15

## 2022-03-07 RX ADMIN — ACETAMINOPHEN 650 MG: 325 TABLET, FILM COATED ORAL at 18:40

## 2022-03-07 RX ADMIN — BETAMETHASONE SODIUM PHOSPHATE AND BETAMETHASONE ACETATE 12 MG: 3; 3 INJECTION, SUSPENSION INTRA-ARTICULAR; INTRALESIONAL; INTRAMUSCULAR at 20:23

## 2022-03-07 RX ADMIN — MAGNESIUM SULFATE HEPTAHYDRATE 2 G/HR: 40 INJECTION, SOLUTION INTRAVENOUS at 23:18

## 2022-03-07 RX ADMIN — MAGNESIUM SULFATE IN WATER 4 G: 40 INJECTION, SOLUTION INTRAVENOUS at 22:45

## 2022-03-07 RX ADMIN — SODIUM CHLORIDE, POTASSIUM CHLORIDE, SODIUM LACTATE AND CALCIUM CHLORIDE 75 ML/HR: 600; 310; 30; 20 INJECTION, SOLUTION INTRAVENOUS at 22:46

## 2022-03-07 ASSESSMENT — ACTIVITIES OF DAILY LIVING (ADL)
TOILETING_ISSUES: NO
DRESSING/BATHING_DIFFICULTY: NO
DOING_ERRANDS_INDEPENDENTLY_DIFFICULTY: NO
WALKING_OR_CLIMBING_STAIRS_DIFFICULTY: NO
CHANGE_IN_FUNCTIONAL_STATUS_SINCE_ONSET_OF_CURRENT_ILLNESS/INJURY: NO
FALL_HISTORY_WITHIN_LAST_SIX_MONTHS: NO
WEAR_GLASSES_OR_BLIND: NO
DOING_ERRANDS_INDEPENDENTLY_DIFFICULTY: NO
CHANGE_IN_FUNCTIONAL_STATUS_SINCE_ONSET_OF_CURRENT_ILLNESS/INJURY: NO
WALKING_OR_CLIMBING_STAIRS_DIFFICULTY: NO
DIFFICULTY_EATING/SWALLOWING: NO
DIFFICULTY_EATING/SWALLOWING: NO
CONCENTRATING,_REMEMBERING_OR_MAKING_DECISIONS_DIFFICULTY: NO
CONCENTRATING,_REMEMBERING_OR_MAKING_DECISIONS_DIFFICULTY: NO
HEARING_DIFFICULTY_OR_DEAF: NO
TOILETING_ISSUES: NO
WEAR_GLASSES_OR_BLIND: NO
DIFFICULTY_COMMUNICATING: NO

## 2022-03-07 NOTE — TELEPHONE ENCOUNTER
Called patient to discuss elevated BP. She states she will get her BP done today at [facility] and write down readings and call RN back today.     Patient also curious about lab results. Reviewed 1 hour GTT abnormal - 134. Ordered 3 hour GTT per protocol and reviewed next steps with patient. She is agreeable to plan and will do 3 hour testing.

## 2022-03-07 NOTE — TELEPHONE ENCOUNTER
----- Message from Shandra Leo CNM sent at 3/5/2022  2:42 PM CST -----  Regarding: blood pressure check  Hi team,     Pao had a mildly elevated blood pressure during her visit 3/4/22. She get twice weekly  testing for severe FGR. Can you please ask that she get her blood pressure checked at one of her appointments this next week. If it is >140/90 we will need to do HELLP labs.   Thanks!    KADEEM Rodriguez, KELLEN

## 2022-03-07 NOTE — PROGRESS NOTES
Pao Pereira was seen for an ultrasound today at the Maternal-Fetal Medicine center.      For the details of the ultrasound please see the report which can be found under the imaging tab.      Pao Aragon MD  , OB/GYN  Maternal-Fetal Medicine  [email or web address]  [phone] (Main MFM Office)  [phone] or [phone] (for 24 hour MFM questions)  [phone] (Pager)

## 2022-03-07 NOTE — PLAN OF CARE
Data: Patient admitted to triage 2 @ 1645 Patient is a . Prenatal record reviewed.   OB History    Para Term  AB Living   4 2 2 0 1 2   SAB IAB Ectopic Multiple Live Births   1 0 0 0 2      # Outcome Date GA Lbr Amilcar/2nd Weight Sex Delivery Anes PTL Lv   4 Current            3 Term 10/03/14 40w0d  3.629 kg (8 lb) M    RADHA   2 Term 12 41w0d  3.175 kg (7 lb) F    RADHA   1 SAB               Obstetric Comments   Denies GDM, HTN, PPH, PPMD.   without issue.   .  Medical History:   Past Medical History:   Diagnosis Date     Asthma     no hospitalizations     Dyshidrotic hand dermatitis     no issues currently     Endometriosis      History of hypothyroidism     no meds currently   .  Gestational age 27w3d. Vital signs per doc flowsheet. Fetal movement present.  Action:, RN obtained at 1645. Verbal consent for EFM, external fetal monitors applied. Admission assessment completed.Patient instructed to report change in fetal movement, contractions, vaginal leaking of fluid or bleeding, abdominal pain, or any concerns related to the pregnancy to her nurse/physician. Patient oriented to room, call light in reach.   Response: Dr. Reaves informed of PT. Plan per provider is Pre E evaluation. Patient verbalized understanding of education and verbalized agreement with plan.     1700: Patient endorses a headache at this time, denies epigastric pain and fluid disturbances.   Vinh Montague RN on 3/7/2022 at 5:01 PM

## 2022-03-08 PROBLEM — O99.810 ABNORMAL O'SULLIVAN GLUCOSE CHALLENGE TEST, ANTEPARTUM: Status: ACTIVE | Noted: 2022-03-08

## 2022-03-08 LAB
ALT SERPL W P-5'-P-CCNC: 67 U/L (ref 0–50)
ALT SERPL W P-5'-P-CCNC: 75 U/L (ref 0–50)
ALT SERPL W P-5'-P-CCNC: 76 U/L (ref 0–50)
AST SERPL W P-5'-P-CCNC: 23 U/L (ref 0–45)
AST SERPL W P-5'-P-CCNC: 28 U/L (ref 0–45)
AST SERPL W P-5'-P-CCNC: 29 U/L (ref 0–45)
B2 GLYCOPROT1 IGG SERPL IA-ACNC: 0.9 U/ML
B2 GLYCOPROT1 IGM SERPL IA-ACNC: <2.4 U/ML
CARDIOLIPIN IGG SER IA-ACNC: <2 GPL-U/ML
CARDIOLIPIN IGG SER IA-ACNC: NEGATIVE
CARDIOLIPIN IGM SER IA-ACNC: 2.5 MPL-U/ML
CARDIOLIPIN IGM SER IA-ACNC: NEGATIVE
CREAT SERPL-MCNC: 0.5 MG/DL (ref 0.52–1.04)
CREAT SERPL-MCNC: 0.51 MG/DL (ref 0.52–1.04)
CREAT SERPL-MCNC: 0.56 MG/DL (ref 0.52–1.04)
ERYTHROCYTE [DISTWIDTH] IN BLOOD BY AUTOMATED COUNT: 13.4 % (ref 10–15)
ERYTHROCYTE [DISTWIDTH] IN BLOOD BY AUTOMATED COUNT: 13.5 % (ref 10–15)
ERYTHROCYTE [DISTWIDTH] IN BLOOD BY AUTOMATED COUNT: 13.7 % (ref 10–15)
GFR SERPL CREATININE-BSD FRML MDRD: >90 ML/MIN/1.73M2
GLUCOSE BLDC GLUCOMTR-MCNC: 101 MG/DL (ref 70–99)
GLUCOSE BLDC GLUCOMTR-MCNC: 107 MG/DL (ref 70–99)
GLUCOSE BLDC GLUCOMTR-MCNC: 109 MG/DL (ref 70–99)
GLUCOSE BLDC GLUCOMTR-MCNC: 120 MG/DL (ref 70–99)
GLUCOSE BLDC GLUCOMTR-MCNC: 122 MG/DL (ref 70–99)
GLUCOSE BLDC GLUCOMTR-MCNC: 98 MG/DL (ref 70–99)
GP B STREP DNA SPEC QL NAA+PROBE: NEGATIVE
HBA1C MFR BLD: 4.6 % (ref 0–5.6)
HCT VFR BLD AUTO: 37.2 % (ref 35–47)
HCT VFR BLD AUTO: 38 % (ref 35–47)
HCT VFR BLD AUTO: 39.3 % (ref 35–47)
HGB BLD-MCNC: 12.9 G/DL (ref 11.7–15.7)
HGB BLD-MCNC: 13 G/DL (ref 11.7–15.7)
HGB BLD-MCNC: 13.7 G/DL (ref 11.7–15.7)
HOLD SPECIMEN: NORMAL
MAGNESIUM SERPL-MCNC: 5.5 MG/DL (ref 1.6–2.3)
MCH RBC QN AUTO: 31.2 PG (ref 26.5–33)
MCH RBC QN AUTO: 31.3 PG (ref 26.5–33)
MCH RBC QN AUTO: 31.6 PG (ref 26.5–33)
MCHC RBC AUTO-ENTMCNC: 34.2 G/DL (ref 31.5–36.5)
MCHC RBC AUTO-ENTMCNC: 34.7 G/DL (ref 31.5–36.5)
MCHC RBC AUTO-ENTMCNC: 34.9 G/DL (ref 31.5–36.5)
MCV RBC AUTO: 90 FL (ref 78–100)
MCV RBC AUTO: 91 FL (ref 78–100)
MCV RBC AUTO: 91 FL (ref 78–100)
PLATELET # BLD AUTO: 224 10E3/UL (ref 150–450)
PLATELET # BLD AUTO: 251 10E3/UL (ref 150–450)
PLATELET # BLD AUTO: 282 10E3/UL (ref 150–450)
RBC # BLD AUTO: 4.12 10E6/UL (ref 3.8–5.2)
RBC # BLD AUTO: 4.17 10E6/UL (ref 3.8–5.2)
RBC # BLD AUTO: 4.34 10E6/UL (ref 3.8–5.2)
TSH SERPL DL<=0.005 MIU/L-ACNC: 0.93 MU/L (ref 0.4–4)
WBC # BLD AUTO: 10.3 10E3/UL (ref 4–11)
WBC # BLD AUTO: 12.1 10E3/UL (ref 4–11)
WBC # BLD AUTO: 9.4 10E3/UL (ref 4–11)

## 2022-03-08 PROCEDURE — 83735 ASSAY OF MAGNESIUM: CPT | Performed by: STUDENT IN AN ORGANIZED HEALTH CARE EDUCATION/TRAINING PROGRAM

## 2022-03-08 PROCEDURE — 99232 SBSQ HOSP IP/OBS MODERATE 35: CPT | Performed by: OBSTETRICS & GYNECOLOGY

## 2022-03-08 PROCEDURE — 120N000002 HC R&B MED SURG/OB UMMC

## 2022-03-08 PROCEDURE — 250N000012 HC RX MED GY IP 250 OP 636 PS 637: Performed by: NURSE PRACTITIONER

## 2022-03-08 PROCEDURE — 85027 COMPLETE CBC AUTOMATED: CPT | Performed by: STUDENT IN AN ORGANIZED HEALTH CARE EDUCATION/TRAINING PROGRAM

## 2022-03-08 PROCEDURE — 82565 ASSAY OF CREATININE: CPT | Performed by: STUDENT IN AN ORGANIZED HEALTH CARE EDUCATION/TRAINING PROGRAM

## 2022-03-08 PROCEDURE — 84450 TRANSFERASE (AST) (SGOT): CPT | Performed by: STUDENT IN AN ORGANIZED HEALTH CARE EDUCATION/TRAINING PROGRAM

## 2022-03-08 PROCEDURE — 99221 1ST HOSP IP/OBS SF/LOW 40: CPT | Mod: GC | Performed by: OBSTETRICS & GYNECOLOGY

## 2022-03-08 PROCEDURE — 85390 FIBRINOLYSINS SCREEN I&R: CPT | Mod: 26 | Performed by: PATHOLOGY

## 2022-03-08 PROCEDURE — 84460 ALANINE AMINO (ALT) (SGPT): CPT | Performed by: STUDENT IN AN ORGANIZED HEALTH CARE EDUCATION/TRAINING PROGRAM

## 2022-03-08 PROCEDURE — 85730 THROMBOPLASTIN TIME PARTIAL: CPT | Performed by: STUDENT IN AN ORGANIZED HEALTH CARE EDUCATION/TRAINING PROGRAM

## 2022-03-08 PROCEDURE — 86147 CARDIOLIPIN ANTIBODY EA IG: CPT | Performed by: STUDENT IN AN ORGANIZED HEALTH CARE EDUCATION/TRAINING PROGRAM

## 2022-03-08 PROCEDURE — 99222 1ST HOSP IP/OBS MODERATE 55: CPT | Performed by: NURSE PRACTITIONER

## 2022-03-08 PROCEDURE — 258N000003 HC RX IP 258 OP 636: Performed by: STUDENT IN AN ORGANIZED HEALTH CARE EDUCATION/TRAINING PROGRAM

## 2022-03-08 PROCEDURE — 84443 ASSAY THYROID STIM HORMONE: CPT | Performed by: STUDENT IN AN ORGANIZED HEALTH CARE EDUCATION/TRAINING PROGRAM

## 2022-03-08 PROCEDURE — 36415 COLL VENOUS BLD VENIPUNCTURE: CPT | Performed by: STUDENT IN AN ORGANIZED HEALTH CARE EDUCATION/TRAINING PROGRAM

## 2022-03-08 PROCEDURE — 36415 COLL VENOUS BLD VENIPUNCTURE: CPT | Performed by: NURSE PRACTITIONER

## 2022-03-08 PROCEDURE — 86146 BETA-2 GLYCOPROTEIN ANTIBODY: CPT | Performed by: STUDENT IN AN ORGANIZED HEALTH CARE EDUCATION/TRAINING PROGRAM

## 2022-03-08 PROCEDURE — 83036 HEMOGLOBIN GLYCOSYLATED A1C: CPT | Performed by: NURSE PRACTITIONER

## 2022-03-08 PROCEDURE — 250N000011 HC RX IP 250 OP 636: Performed by: STUDENT IN AN ORGANIZED HEALTH CARE EDUCATION/TRAINING PROGRAM

## 2022-03-08 PROCEDURE — 250N000013 HC RX MED GY IP 250 OP 250 PS 637: Performed by: STUDENT IN AN ORGANIZED HEALTH CARE EDUCATION/TRAINING PROGRAM

## 2022-03-08 RX ORDER — DEXTROSE MONOHYDRATE 25 G/50ML
25-50 INJECTION, SOLUTION INTRAVENOUS
Status: DISCONTINUED | OUTPATIENT
Start: 2022-03-08 | End: 2022-03-27 | Stop reason: HOSPADM

## 2022-03-08 RX ORDER — PRENATAL VIT/IRON FUM/FOLIC AC 27MG-0.8MG
1 TABLET ORAL DAILY
Status: DISCONTINUED | OUTPATIENT
Start: 2022-03-08 | End: 2022-03-27

## 2022-03-08 RX ORDER — NICOTINE POLACRILEX 4 MG
15-30 LOZENGE BUCCAL
Status: DISCONTINUED | OUTPATIENT
Start: 2022-03-08 | End: 2022-03-27 | Stop reason: HOSPADM

## 2022-03-08 RX ORDER — NICOTINE POLACRILEX 4 MG
15-30 LOZENGE BUCCAL
Status: DISCONTINUED | OUTPATIENT
Start: 2022-03-08 | End: 2022-03-14

## 2022-03-08 RX ORDER — DEXTROSE MONOHYDRATE 25 G/50ML
25-50 INJECTION, SOLUTION INTRAVENOUS
Status: DISCONTINUED | OUTPATIENT
Start: 2022-03-08 | End: 2022-03-14

## 2022-03-08 RX ORDER — ASPIRIN 81 MG/1
81 TABLET ORAL DAILY
Status: DISCONTINUED | OUTPATIENT
Start: 2022-03-08 | End: 2022-03-27

## 2022-03-08 RX ADMIN — ASPIRIN 81 MG: 81 TABLET, COATED ORAL at 09:35

## 2022-03-08 RX ADMIN — PRENATAL VITAMINS-IRON FUMARATE 27 MG IRON-FOLIC ACID 0.8 MG TABLET 1 TABLET: at 09:35

## 2022-03-08 RX ADMIN — SODIUM CHLORIDE, POTASSIUM CHLORIDE, SODIUM LACTATE AND CALCIUM CHLORIDE 10 ML/HR: 600; 310; 30; 20 INJECTION, SOLUTION INTRAVENOUS at 23:41

## 2022-03-08 RX ADMIN — BETAMETHASONE SODIUM PHOSPHATE AND BETAMETHASONE ACETATE 12 MG: 3; 3 INJECTION, SUSPENSION INTRA-ARTICULAR; INTRALESIONAL; INTRAMUSCULAR at 20:38

## 2022-03-08 RX ADMIN — MAGNESIUM SULFATE HEPTAHYDRATE 2 G/HR: 40 INJECTION, SOLUTION INTRAVENOUS at 18:10

## 2022-03-08 RX ADMIN — INSULIN ASPART 1 UNITS: 100 INJECTION, SOLUTION INTRAVENOUS; SUBCUTANEOUS at 21:27

## 2022-03-08 NOTE — CARE PLAN
Data:  Maternal status: stable, 2 severe range pressures treated with labetalol (see mar), MD aware. Continues to endorse headache at this time.    Action: Continue with plan of care, which is monitor blood pressures, Pre E labs, and fetal status. NICU consult in AM Patient encouraged to move extremities while in bed.  Response: Patient coping verbalized understanding of plan.   Vinh Montague RN on 3/7/2022 at 10:04 PM

## 2022-03-08 NOTE — CONSULTS
NEW INPATIENT DIABETES MANAGEMENT CONSULT  Pao Pereira  Age: 41 year old  MRN # [id number]   YOB: 1980    Chief Complaint: hyperglycemia  Reason for Consult: Failed GCT; BMZ administration  Consulting Provider: Cheyenne Nguyen MD     History of Present Illness: 41 year old  at 27w4d by 11w0d US, here for preeclampsia without severe features. Pregnancy is notable for severe FGR and asthma.    on her 1 hour glucose screen on 3/4/22.  She was called and told of this result.  She states that she has had a poor diet prior to this.  Eating high carb, processed, etc.  Not watching what she is eating.  She started making dietary changes this past weekend and started exercising.  She has appts twice per week given the severe FGR.  At her appt, her BP had been noted to be on the higher end.  Dr. Aragon sent her in to be monitored and BP continued to be elevated.  She will stay in the hospital until delivery, per OB team for Pre E w/SF.      She denies a history of DM or previous GDM with other pregnancies.  A1C 4.6 in November at [facility] (early on in pregnancy).  A1C 4.6 on 3/8/22.  She is trying to eat more low carb here.  Had an omelette this AM for bfast.  GFR>90.  Hgb 13.7. TSH 0.93.  Received betamethasone last night and will receive again tonight.      BG trend:         Other Active Medical Problems: Pre E w/SF, severe FGR  Diabetes Mellitus Type: ?GDM, steroid induced hyperglycemia  Duration:  New diagnosis  Diabetic Complications: none known  Prior to Admission Diabetes Regimen:      BG monitor: none    Diet: states she has not been eating healthy during this pregnancy or watching diet at all.   Usual BG control PTA:   A1c 4.6   History of DKA: denies  Able to Detect Hypoglycemia: unknown  Usual Diabetes Care Provider: none  Primary Care Provider: Nathan Penn--OB provider  Factors Impacting IP Glucose Control: steroids, pregnancy  Current Diet: regular diet    10 point  ROS completed with pertinent positives and negatives noted in the HPI  Past medical, family and social histories are reviewed and updated.    Social History    Tobacco: denies    Alcohol: denies    Marital Status:      Place of Residence: [place], [place]    Occupation: unemployed    Family History   maternal grandfather with DM    Physical Exam   BP (!) 147/84 (BP Location: Left arm, Patient Position: Semi-Gibbs's, Cuff Size: Adult Large)   Pulse 79   Temp 98.6  F (37  C) (Oral)   Resp 22   Wt 107.5 kg (237 lb)   SpO2 100%   BMI 44.06 kg/m    General: pleasant, in no distress. Sitting up in chair.  HEENT: normocephalic, atraumatic. Oral mucous membranes moist.   Lungs: unlabored respiration, no cough  ABD: rounded, nondistended  Skin: warm and dry, no obvious lesions  MSK:  moves all extremities  Lymp:  no LE edema   Mental status:  alert, oriented to self, place, time  Psych:   calm and appropriate interaction     Most Recent Laboratory Tests:  Recent Labs   Lab 03/08/22  0657   HGB 13.7     No results for input(s): A1C in the last 168 hours.  Recent Labs   Lab 03/08/22  0657   CR 0.51*     Recent Labs   Lab 03/08/22  0833 03/08/22  0249   * 120*       Assessment:   1) possible GDM, failed 1 hour glucose tolerance screen  2) steroid induced hyperglycemia    Plan:    -NPH 8 units once now, will reassess for further NPH doses   -start Novolog 1:16g CHO coverage with meals, snacks   -start Novolog custom sliding scale insulin, 1 per 50>100 AC, >120 HS   -BG monitoring TID AC, 2 hours PP, HS, 0200   -hypoglycemia protocol   -recommend 75g CHO diet with carb counting protocol   -diabetes education consult placed   -nutrition consult placed   -on discharge, follow up TBD, will need OGTT 6 weeks post delivery    Discussed plan of care with patient, nursing, and primary team.   Thank you for this consult; Inpatient Diabetes will continue to follow.     To contact Endocrine Diabetes service:   From 8AM-4PM: page  inpatient diabetes provider that is following the patient  For questions or updates from 4PM-8AM: page the diabetes job code for on call fellow: [phone]    80 minutes spent on the date of the encounter doing chart review, history and exam, documentation and further activities per the note      Over 50% of my time on the unit was spent counseling the patient and/or coordinating care regarding acute hyperglycemia management.  See note for details.    KADEEM De Oliveira, CNP  Inpatient Diabetes Management Service  Pager [phone]

## 2022-03-08 NOTE — PROVIDER NOTIFICATION
03/08/22 0602   Provider Notification   Provider Name/Title Dr. Simmons (G2)   Method of Notification Electronic Page   Request Evaluate - Remote   Notification Reason Status Update;Other (Comment)     Patient c/o chest pain that radiates to her back. Has since resolved since sitting her higher up in bed.

## 2022-03-08 NOTE — PROGRESS NOTES
"CLINICAL NUTRITION SERVICES - ASSESSMENT NOTE     Nutrition Prescription    Malnutrition Status:    Patient does not meet two of the established criteria necessary for diagnosing malnutrition    Recommendations already ordered by Registered Dietitian (RD):  Nutrition education  PRN snacks    Future/Additional Recommendations:  Additional diet education (carb counting) as needed closer to discharge      REASON FOR ASSESSMENT  Pao Pereira is a 41 year old female assessed by the dietitian for Provider Order - new GDM  PMH significant for hypothyroidism and asthma admitted for preeclampsia without severe features. Pregnancy is notable for severe FGR. Currently 27w4d.  NUTRITION HISTORY  Pt with no h/o DM. Stated she does not follow a specific diet at home but mentioned she has not been eating \"healthy\". Per chart review pt has gained ~22# so far during pregnancy.    CURRENT NUTRITION ORDERS  Diet: High Consistent Carbohydrate (75g CHO per meal)  Intake/Tolerance: PO has been good sicne admit. Denied N/V/C/D or difficulty chewing.swallowing. Pt was interested in diet education re GDM and general healthy diet. RD discussed sources of CHO. Discussed planning meals using my plate (DM version). /pt asked good questions throughout . RD encouraged protein with every meal/snack. Did not discuss carb counting at this time as it is unknown if she will require insulin at discharge. RD will provide carb counting diet as needed.     LABS  Labs reviewed    MEDICATIONS  Medications reviewed:  Novolog (sliding scale, 1u/16g)  NPH  Prenatal w/ iron  LR @ 10-125ml/hr  PRN: novolog, LR, reglan, zofran, compazine    ANTHROPOMETRICS  Height: 156.2 cm (5'1.5\")  Most Recent Weight: 107.5 kg (237 lb)    IBW: 48.8 kg  BMI: 44.06 kg/m^2, Obesity Grade III BMI >40  Prepregnancy BMI: 40 kg/m^2  Weight History: Recommended wt gain during pregnancy with prepregnancy BMI of 40 kg/m^2, 11-20#. Pt has gained 22# so far during pregnancy.   Wt " Readings from Last 10 Encounters:   03/08/22 107.5 kg (237 lb)   03/04/22 109.8 kg (242 lb)   08/31/21 97.7 kg (215 lb)-prepregnancy wt   01/30/20 80.7 kg (178 lb)   01/09/20 80.7 kg (178 lb)   12/31/19 82.6 kg (182 lb)   12/10/19 83.5 kg (184 lb)   11/25/19 85.3 kg (188 lb)   04/23/19 92.3 kg (203 lb 6.4 oz)   07/16/18 96.6 kg (213 lb)     Dosing Weight: 61 kg (adjusted using prepregnancy wt of 97.7 kg and ideal wt of 48.8 kg)    ASSESSED NUTRITION NEEDS  Estimated Energy Needs: 8617-4191 kcals/day (20 - 25 kcals/kg +452)  Justification: Obese and increased needs (third trimester)  Estimated Protein Needs: 86-98 grams protein/day (1 - 1.2 grams of pro/kg+25)  Justification: Increased needs and Obesity guidelines  Estimated Fluid Needs: 3000 mL/day  Justification: Increased needs    PHYSICAL FINDINGS  See malnutrition section below.     MALNUTRITION  % Intake: No decreased intake noted  % Weight Loss: None noted  Subcutaneous Fat Loss: None observed  Muscle Loss: None observed  Fluid Accumulation/Edema: 1-2+ BLE  Malnutrition Diagnosis: Patient does not meet two of the established criteria necessary for diagnosing malnutrition    NUTRITION DIAGNOSIS  Predicted food- and nutrition-related knowledge deficit related to GDM as evidenced by no previous GDM diet education and limited baseline knowledge       INTERVENTIONS  Implementation  Nutrition education: Discussed role of RD, menu ordering and available snacks. Discussed carbohydrate sources, portion sizes and meal ordering (for well balanced meals). Encouraged adequate protein. Handouts provided    PRN snacks    Goals  Patient to consume % of nutritionally adequate meal trays TID, or the equivalent with supplements/snacks.     Monitoring/Evaluation  Progress toward goals will be monitored and evaluated per protocol.    Velvet Mcbride MS, RD, LDN  Unit Pager [phone]

## 2022-03-08 NOTE — PLAN OF CARE
Goal Outcome Evaluation:  Pt states is comfortable, denies pain, feeling ctx's leaking or  bleeding. No vision changes or headache. Running Mag 2g continuous with LR total fluid 60/hour. BG as ordered. Baby difficult to trace; pt seating on sofa during this morning. Eating and drinking well. NICU consult done; SW consult tomorrow morning.

## 2022-03-08 NOTE — H&P
OB History and Physical     Pao Pereira    MRN# [id number]  YOB: 1980      HPI: Pao Pereira is a 41 year old  at 27w3d by 11w0d US who presents today for blood pressure monitoring. The patient notes that over the past two days she has been having headaches. She notes that they are frontal and occipital. States that the pain is better today and improved with heat and hydration. She states that yesterday the pain was a 6/10 and today is a 3/10. She also notes that she has had a few episodes of changes in vision with light spots that she notes. She endorses some shortness of breath without cough. She notes that she does not have any epigastric pain. Lower extremity edema is stable. She was seen in clinic today and noted to have elevated blood pressures. She was then asked to present to triage for evaluation.     Pregnancy notable for:   Obesity  Severe FGR  Hypothyroidism  Asthma     Her previous pregnancies were unremarkable per patient report. Her delivery was uncomplicated. Denies history of postpartum hemorrhage, shoulder dystocia, pre-eclampsia. No history of high blood pressure.    She reports good fetal movement. Denies LOF, vaginal bleeding, or contractions.       Prenatal Labs:   Lab Results   Component Value Date    AS Negative 2022    HGB 12.9 2022       GBS Status:   No results found for: GBS      OBHX:   OB History    Para Term  AB Living   4 2 2 0 1 2   SAB IAB Ectopic Multiple Live Births   1 0 0 0 2      # Outcome Date GA Lbr Amilcar/2nd Weight Sex Delivery Anes PTL Lv   4 Current            3 Term 10/03/14 40w0d  3.629 kg (8 lb) M    RADHA   2 Term 12 41w0d  3.175 kg (7 lb) F    RADHA   1 SAB               Obstetric Comments   Denies GDM, HTN, PPH, PPMD.   without issue.       MedicalHX:   Past Medical History:   Diagnosis Date     Asthma     no hospitalizations     Dyshidrotic hand dermatitis     no issues currently      Endometriosis      History of hypothyroidism     no meds currently       SurgicalHX:   Past Surgical History:   Procedure Laterality Date     BIOPSY      GYN     DILATION AND CURETTAGE, HYSTEROSCOPY DIAGNOSTIC, COMBINED N/A 12/31/2019    Procedure: HYSTEROSCOPY, DIAGNOSTIC, WITH DILATION AND CURETTAGE OF UTERUS;  Surgeon: Jd Epps MD;  Location:  OR     LASER CO2 LAPAROSCOPY DIAGNOSTIC, LYSIS ADHESIONS, COMBINED N/A 12/31/2019    Procedure: LAPAROSCOPY, DIAGNOSTIC, WITH LYSIS OF ADHESIONS USING CO2 LASER, BILATERAL OVARIAN CYSTOSCOPY;  Surgeon: Jd Epps MD;  Location:  OR       Medications:   No current facility-administered medications on file prior to encounter.  aspirin (ASA) 81 MG chewable tablet, Take 81 mg by mouth daily  PRENATAL VIT-FE FUMARATE-FA PO,   UNABLE TO FIND, MEDICATION NAME: IMMUNITY BOOSTER        Allergies:  Allergies   Allergen Reactions     Dust Mites      Nickel Rash       FamilyHX:  Family History   Problem Relation Age of Onset     Hypertension Father      Thyroid Cancer Maternal Grandmother      Myocardial Infarction Maternal Grandfather      Cerebral aneurysm Maternal Grandfather      Diabetes Maternal Grandfather      Kidney failure Maternal Grandfather         was on dialysis     Thyroid Cancer Maternal Aunt      Breast Cancer Maternal Aunt      Colon Cancer Maternal Aunt      Bone Cancer Maternal Uncle      Colon Cancer Paternal Aunt      Breast Cancer Other         MGGM       SocialHX:   Social History     Socioeconomic History     Marital status:      Spouse name: Abel     Number of children: 2     Years of education: None     Highest education level: None   Occupational History     Occupation: unemployed/homemaker   Tobacco Use     Smoking status: Never Smoker     Smokeless tobacco: Never Used   Substance and Sexual Activity     Alcohol use: Not Currently     Alcohol/week: 1.0 standard drink     Comment: Very rarely     Drug use: No     Sexual activity:  Yes     Partners: Male   Other Topics Concern     Parent/sibling w/ CABG, MI or angioplasty before 65F 55M? Not Asked   Social History Narrative     None     Social Determinants of Health     Financial Resource Strain: Not on file   Food Insecurity: Not on file   Transportation Needs: Not on file   Physical Activity: Not on file   Stress: Not on file   Social Connections: Not on file   Intimate Partner Violence: Not on file   Housing Stability: Not on file       ROS: 10 point ROS negative other than above    Physical Exam:  Patient Vitals for the past 24 hrs:   BP Temp Temp src Pulse Resp   22 1735 (!) 144/86 -- -- -- --   22 1721 (!) 155/99 98.2  F (36.8  C) Oral 79 16   22 1703 (!) 155/99 -- -- -- --   22 1640 (!) 151/92 -- -- -- --     General: AAOx3, appropriately interactive, NAD, appears generally well  CV: RRR, normal S1/S2, no m/r/g  Lungs: CTAB, non-labored breathing, no wheezes, rales, or rhonchi  Abdomen: soft, gravid, non-tender  Extremities: Non-tender with trace edema bilaterally in LE    FHT: 140, moderate variability, accels present, no decels   [place]: 0 contractions in ten minutes    Labs:   HELLP labs, UPC    Assessment & Plan: 41 year old  at 27w3d by 11w0d US, here for preeclampsia without severe features. Pregnancy is notable for severe FGR and asthma.     # Pre-eclampsia with Severe Features (BP)  - Serial BP monitoring   - IV Antihypertensives prn for sustained severe range blood pressures (>160/>110), s/p IV labetalol 20 mg x1 ()  - No PTA antihypertensives  - Labs: HELLP labs, UPC  - Diet: NPO at this time  - Mag 4 gram bolus ordered   - 2 gram/hour maintenance fluid following bolus   - Continue q4hr mag checks   - Strict I/O, daily weights  - Will consider magnesium administration pending clinical course    # FGR  - Growth ; EFW 631g, <1%, AC <1%  - UAD 3/7 wnl  - Given pre-eclampsia with severe features, will initiate betamethasone course at this  time  - Continue expectanant management through the betamethasone windown    # PNC  - Rh pos, Rubella immune, GCT failed, needs GTT, GBS unknown  - Other prenatal labs wnl  - Imaging: see above and imaging tab     # FWB:   Cat I tracing, reactive and appropriate for gestational age; transverse by MFM US today  - Continuous Fetal Monitoring  - BMZ for fetal lung maturity   - NICU for delivery   - Intrauterine resuscitative measures prn    Patient evaluated with Dr. Lorin Simmons MD  OBGYN PGY-2  March 7, 2022 9:39 PM    Staff MD Note    I appreciate the note by Dr. Simmons.  Any necessary changes have been made by me.  I saw and evaluated the patient and agree with the findings and plan of care as documented in the note.    Pao Padgett MD

## 2022-03-08 NOTE — PROGRESS NOTES
[facility]  Magnesium Check Note    S:   Patient is feeling well. Endorses a slight headache that is improved compared to earlier. Denies shortness of breath but endorses some heaviness/pressure sensation in her abdomen that makes it difficult to take a deep breath. Denies headache, vision changes/spots in vision, chest pain, RUQ or epigastric pain. No contractions.    O:  Patient Vitals for the past 4 hrs:   BP SpO2   22 0134 121/70 98 %     Gen: Appears well, nad  CV:  RRR, no murmurs  Pulm:  CTAB, no wheezes or crackles  Abd:  Gravid, soft, non-tender  Ext:  Patellar reflexes 1+ b/l, 0 beats clonus b/l, trace LE edema b/l    FHT: Baseline 130, mdoerate variability,  accelerations, no decelerations  [place]: 0 contractions in 10 minutes    I/O:  I/O last 3 completed shifts:  In: 1000 [P.O.:1000]  Out: 500 [Urine:500]  312 ml/hr over past 4 hours    A/P:  41 year old  at 27w4d by 11w0d US, here for preeclampsia without severe features. Pregnancy is notable for severe FGR and asthma.      # Pre-eclampsia with Severe Features (BP)  - Serial BP monitoring   - IV Antihypertensives prn for sustained severe range blood pressures (>160/>110), s/p IV labetalol 20 mg x1 ()  - No PTA antihypertensives  - Labs: ALT 86>79>78>76, otherwise wnl, UPC <0.05   - Diet: NPO at this time  - Mag 4 gram bolus ordered               - 2 gram/hour maintenance fluid following bolus               - Continue q4hr mag checks               - Strict I/O, daily weights  - Will consider magnesium administration pending clinical course     # FGR  - Growth ; EFW 631g, <1%, AC <1%  - UAD 3/7 wnl  - Betamethasone x1 (2023 3/7), anticipate second dose 3/8  - Continue expectanant management through the betamethasone window     # PNC  - Rh pos, Rubella immune, GCT failed, needs GTT, GBS unknown  - Other prenatal labs wnl  - Imaging: see above and imaging tab                 # FWB:   Cat I tracing, reactive  and appropriate for gestational age; transverse by MFM US today  - Continuous Fetal Monitoring  - BMZ for fetal lung maturity   - NICU for delivery   - Intrauterine resuscitative measures prn    Eber Simmons MD  OBGYN PGY-2  March 8, 2022 4:33 AM

## 2022-03-08 NOTE — PROGRESS NOTES
"[facility]  Progress Note    S:   She is doing well this morning. Headache has resolved from last night. She had \"hazy/white-darryn\" vision for a short time last night that has since resolved. She denies contractions, vaginal bleeding, chest pain, SOB, RUQ pain, blurry vision. She had many questions regarding her diagnosis and course of her hospitalization- answered to the best of our abilities.  was on the phone during the discussion.     O:  Vital signs:  Temp: 98.2  F (36.8  C) Temp src: Oral BP: 133/82 Pulse: 79   Resp: 16 SpO2: 100 %          Estimated body mass index is 44.99 kg/m  as calculated from the following:    Height as of 3/4/22: 1.562 m (5' 1.5\").    Weight as of 3/4/22: 109.8 kg (242 lb).      Gen: Appears well, nad  CV:  RRR, no murmurs  Pulm:  CTAB, no wheezes or crackles  Abd:  Gravid, soft, non-tender  Ext:  Patellar reflexes 1+ b/l, 0 beats clonus b/l, trace LE edema b/l    FHT: Baseline 135, moderate variability, present accelerations, spontaneous decelerations X1 [place] at 120 lasting 2 minutes  [place]: 0 contractions in 10 minutes    I/O:  Net: +53 ml since 0000    A/P:  41 year old  at 27w4d by 11w0d US, here for preeclampsia with severe features. Pregnancy is notable for severe FGR and asthma.      # Pre-eclampsia with Severe Features (BP)  - Serial BP monitoring   - IV Antihypertensives prn for sustained severe range blood pressures (>160/>110), s/p IV labetalol 20 mg x1 (3/7 2115)  - HR has been mid 50's s/p labetolol, so consider an alternative anti-HTN medication (hydralazine)  - No PTA antihypertensives  - Labs: ALT 86>79>75, otherwise wnl, UPC <0.05   - Mag 4 gram bolus ordered               - 2 gram/hour maintenance fluid following bolus through BMZ window               - Continue q4hr mag checks               - Strict I/O, daily weights  - admission until 34wks d/t pre-E with SF, pending earlier declaration     # FGR  - Growth ; EFW 631g, " <1%, AC <1%  - UAD 3/7 wnl  - Betamethasone x1 (2023 3/7), anticipate second dose 3/8 PM  - Continue expectant management through the betamethasone window    #Failed 1-hr Glucose Tolerance Test  - 134 on 3/4  - fasting BGL 3/8   - consult endocrinology pending  - 3-hr GTT unable to be completed due to BMZ. Can consider 3-hr GTT in a couple weeks, if deemed necessary.   - will monitor qid BGL to determine whether to add insulin vs diet control throughout the rest of the pregnancy  - no history of GDM, DM outside of pregnancy  - diabetic education  - high carb consistent diet     # PNC  - Rh pos, Rubella immune, GCT failed, needs GTT, GBS unknown  - Other prenatal labs wnl  - Imaging: see above and imaging tab                 # FWB:   Cat I tracing, reactive and appropriate for gestational age; transverse by MFM US 3/7  - Continuous Fetal Monitoring  - BMZ for fetal lung maturity   - NICU for delivery: pending consult likely 3/8  - Intrauterine resuscitative measures francesco Frank, MS4  7:39 AM  2022    Physician Attestation   I, Luara Rangel, was present with the medical/[employer]ORE[employer] student who participated in the service and in the documentation of the note.  I have verified the history and personally performed the physical exam and medical decision making.  I agree with the assessment and plan of care as documented in the note.      I personally reviewed vital signs, medications, labs, imaging and EFM.    Key findings: In summary, Pao Pereira is a  at 27w4d admitted with clinical picture consistent with preeclampsia with severe features in context of know severe fetal growth restriction.  Currently receiving magnesium and BMZ course.  Will plan inpatient management through delivery, which is recommended by 34w0d, or as clinically indicated by maternal and fetal status.  Patient currently meets criteria for expectant management and will continue close inpatient management.     Laura  MD Juan Carlos   Date of Service (when I saw the patient): 22    Time Spent on this Encounter   I spent 30 minutes on the unit/floor managing the care of Pao Pereira. Over 50% of my time was spent on the following:   - Counseling the patient and/or family regarding: diagnostic results, prognosis, risks and benefits of treatment options, recommended follow-up, medical compliance and prevention of disease  - Coordination of care with the: nurse and patient    In summary, Pao Pereira is a  at 27w4d admitted with clinical picture consistent with preeclampsia with severe features in context of know severe fetal growth restriction.  Currently receiving magnesium and BMZ course.  Will plan inpatient management through delivery, which is recommended by 34w0d, or as clinically indicated by maternal and fetal status.  Patient currently meets criteria for expectant management and will continue close inpatient management.       Laura Rangel MD

## 2022-03-08 NOTE — PLAN OF CARE
Patient here for r/o pre-eclampsia; has diet controlled GDM and SIUGR. Patient c/o HA, edema, and epigastric pain. BPs improved and WNL. All other vitals WNL. Per patient, HA has improved since starting magnesium. Patient on continuous EFM; EFM WNL, see flowsheets. No contractions present. Denies vaginal bleeding or leaking. Patient c/o chest pain that radiated to her back this morning, resolved when she sat up; Dr. Simmons notified. Continue with plan of care.     2245: Magnesium loading dose started  2318: Magnesium continuous dose started  0249:

## 2022-03-08 NOTE — PROVIDER NOTIFICATION
03/07/22 2114   Vital Signs   Oximeter Heart Rate 53 bpm   BP (!) 176/87   Dr. Troy araiza. Meets criteria for 20 of labetalol. See oziel Montague RN on 3/7/2022 at 9:29 PM

## 2022-03-08 NOTE — CONSULTS
[facility]     NICU Antepartum Counseling Consult     I was asked to provide antepartum counseling for Pao Pereira at the request of Dr. Rangel. She is currently 27 weeks and 4 days gestation with pregnancy complicated by severe fetal growth restriction and pre-eclampsia with severe features. She has received betamethasone and magnesium with the plan for delivery by 34 weeks gestation.     Ms. Pereira was accompanied by her  (Abel). We discussed the wide range of needs for babies born between 27 and 34 weeks, starting with delivery room resuscitation. I discussed the multidisciplinary team that would be present to care for their baby. We discussed the likely need for respiratory support with CPAP or a mechanical ventilator. I covered possible cardiac support including central line placement, inotropic medications and/or fluid resuscitation.    I discussed that every part of their baby will be immature. This may manifest as need for respiratory support, feeding difficulties, increased risk of infection, intraventricular hemorrhage, and/or longterm developmental differences. We discussed visiting policies in the NICU and the importance of parental involvement. I discussed the benefits of skin to skin care and the basics of initiating lactation for an infant in the NICU. We discussed discharge criteria and that most babies are hospitalized until around the time of their due date, potentially longer if there are major medical complications.        We are happy to return for further questions or to re-vist this conversation should additional questions arise. Recommend  SW consultation. Parents would like child life resources for supporting their older children at home.      Shirley Kessler MD  - Medicine Fellow   Intensive Care Unit  [facility]        Floor Time (min): 15  Face to Face Time (min): 30  Total  Time (minutes): 45  More than 50% of my time was spent in direct, face to face, antepartum counseling with the above patient.    Attending Neonatologist:  I was not present in-person for this consult. I discussed the above with the fellow, Dr. Shirley Kessler, and agree with the information in this note.  JERARDO VILLANUEVA MD

## 2022-03-08 NOTE — DISCHARGE SUMMARY
[facility] Discharge Summary    Pao Pereira MRN# [id number]   Age: 41 year old YOB: 1980     Date of Admission:  3/7/2022  Date of Discharge::  3/29/2022  Admitting Physician:  Pao Padgett MD  Discharge Physician:  Colette Moreno MD          Admission Diagnoses:     IUP at 27w3d  Pre-eclampsia with severe features  Obesity  Severe fetal growth restriction  Hypothyroidism  Asthma          Discharge Diagnosis:     Sinus bradycardia, asymptomatic   Transaminitis  Thrombocytopenia  Postpartum hemorrhage  Acute blood loss anemia             Procedures:     IV magnesium  Fetal monitoring  EKG  GTT  BPP  UAD  Magnesium  Betamethasone  Primary classical  section via Pfannenstiel skin incision with triple layer uterine closure  Spinal anesthesia             Medications Prior to Admission:     Facility-Administered Medications Prior to Admission   Medication Dose Route Frequency Provider Last Rate Last Admin     [DISCONTINUED] leuprolide (LUPRON DEPOT) kit 3.75 mg  3.75 mg Intramuscular Q28 Days Namrata Liu APRN CNP   3.75 mg at 20 1140     Medications Prior to Admission   Medication Sig Dispense Refill Last Dose     PRENATAL VIT-FE FUMARATE-FA PO         UNABLE TO FIND MEDICATION NAME: IMMUNITY BOOSTER        [DISCONTINUED] aspirin (ASA) 81 MG chewable tablet Take 81 mg by mouth daily                Discharge Medications:        Review of your medicines      START taking      Dose / Directions   acetaminophen 325 MG tablet  Commonly known as: TYLENOL  Used for: S/P primary low transverse       Dose: 650 mg  Take 2 tablets (650 mg) by mouth every 6 hours as needed for mild pain Start after Delivery.  Quantity: 100 tablet  Refills: 0     ferrous sulfate 325 (65 Fe) MG tablet  Commonly known as: FEROSUL  Used for: S/P primary low transverse       Dose: 325 mg  Take 1 tablet (325 mg) by mouth daily (with breakfast)  Quantity: 60 tablet  Refills:  0     ibuprofen 600 MG tablet  Commonly known as: ADVIL/MOTRIN  Used for: S/P primary low transverse       Dose: 600 mg  Take 1 tablet (600 mg) by mouth every 6 hours as needed for moderate pain Start after delivery  Quantity: 60 tablet  Refills: 0     oxyCODONE 5 MG tablet  Commonly known as: ROXICODONE  Used for: S/P primary low transverse       Dose: 5 mg  Take 1 tablet (5 mg) by mouth every 4 hours as needed for breakthrough pain  Quantity: 8 tablet  Refills: 0     senna-docusate 8.6-50 MG tablet  Commonly known as: SENOKOT-S/PERICOLACE  Used for: S/P primary low transverse       Dose: 1 tablet  Take 1 tablet by mouth daily Start after delivery.  Quantity: 100 tablet  Refills: 0        CONTINUE these medicines which have NOT CHANGED      Dose / Directions   PRENATAL VIT-FE FUMARATE-FA PO      Refills: 0     UNABLE TO FIND      MEDICATION NAME: IMMUNITY BOOSTER  Refills: 0        STOP taking    aspirin 81 MG chewable tablet  Commonly known as: ASA              Where to get your medicines      These medications were sent to [facility] [address]  [address] [address]    Phone: [phone]     acetaminophen 325 MG tablet    ferrous sulfate 325 (65 Fe) MG tablet    ibuprofen 600 MG tablet    oxyCODONE 5 MG tablet    senna-docusate 8.6-50 MG tablet               Consultations:   Consultation during this admission received from diabetic education, endocrinology, NICU, nutrition, physical therapy, cardiology          Brief History of Admission and Antepartum Course:   41 year old  at 27w3d by 11w0d US, here for preeclampsia without severe features by blood pressure and headache criteria. Pregnancy is notable for severe FGR and asthma.     She received IV labetalol 20mg on admission with improvement in severe range blood pressures. She was started on IV magnesium for 48 hours through BMZ window. She received fetal  monitoring while on magnesium that was reassuring. Her HELLP labs were trended and were stable with elevated ALT 86>79>>55. She remained mild range-normotensive throughout first 48 hours of admission.     Her TSH was normal on admission. She had an elevated GCT and was started on 10U NPH nightly and Novolog 1U per 20g CHO while on BMZ taper.     HD#3: consent for c/s, blood transfusion; lovenox ppx started. Her LFTs normalized  HD#4: her magnesium was discontinued  HD#5: EKG WNL other than bradycardia. Spoke with cardiology- advised to consult again if bradycardia not improving with light exercise. Her insulin was discontinued due to normalized blood glucoses.   HD#10: Blood glucose levels reassuring for 3 days. Stopped POC glucose checks. Plan for GTT on HD#13.   HD#13: Passed GTT, so no need for continued monitoring of glucose levels.   HD#15: Umbilical artery dopplers with findings of increased placental resistance.  HD#17: ALT elevated to 64, AST 46, Plt 129. Asymptomatic, labs trended.   HD#18: ALT elevated to 98, AST 73, Plts decreased to 114. Labs tomorrow morning. Rescue BMZ given.  HD#19: ALT and AST decreased. Plts increased. #2 BMZ given. Fetus reassuring for gestational age.     She received follow up OB ultrasounds which showed reassuring fetal status.     On HD#21 she was noted to have worsening symptoms. Labs were obtained and showing significantly increased LFTs. Her blood pressure was noted to be intermittently severe range. Due to worsening symptoms and labs, delivery was recommended via Primary CS. The patient was amenable with this plan. She was started on magnesium at 2g/hour following a 6 gram load.         Operative Course:   Procedure performed is primary classical  section via Pfannenstiel skin incision with triple layer uterine closure. Procedure was uncomplicated. EBL 1500mL    Operative findings:   1. Single, liveborn male infant at 1732 hours on 3/27/2022. Apgars and weight  pending  Fetal presentation: transverse, back up. Amniotic fluid: clear.    2. Arterial Cord pH pending  3. Placenta intact with 3 vessel cord.     4. Normal appearing uterus. Filmy adhesions between ovaries and uterus. Large right sided ovarian cyst. Simple appearning  5.  No abdominal wall adhesions          Postpartum Hospital Course:   She received 24 hours of Magnesium postpartum. Blood pressures controlled without medications. HELLP labs initially peaked POD#0 at 247 ALT and 238 AST, however were improving upon discharge to  and . Platelets were stable at 91 from 104 immediately post op. Plan to repeat HELLP labs at 1 week follow up.     On discharge, her pain was well controlled. Vaginal bleeding is similar to peak menstrual flow.  Voiding without difficulty.  Ambulating well and tolerating a normal diet.  No fever.  Breast pumping well.  Infant is stable.  She was discharged on post-partum day #2.    Post-partum hemoglobin: 9.5, discharged with PO FE    Contraception: POPs vs condoms    Rhogam was not indicated          Discharge Instructions and Follow-Up:     Discharge diet: Regular   Discharge activity: Lifting restricted to 15 pounds  No lifting, driving, or strenuous exercise for 6 week(s)   Discharge follow-up: Follow up with OB in 1 week for BP control, repeat HELLP labs, and 6 weeks for Postpartum visit   Wound care: Steri-strips off in 7 days           Discharge Disposition:     Discharged to home      Roseann Dempsey MD PGY3  Obstetrics & Gynecology  03/29/22     I saw and evaluated this patient prior to discharge. I discussed the patient with the resident and agree with plan of care as documented in the resident note.   I personally reviewed vital signs, medications, labs.   I personally spent 10 minutes on discharge activities.  Colette Moreno MD

## 2022-03-08 NOTE — CONSULTS
Consulted by Jessica Burr with Inpatient Diabetes Management Team to run a test claim for Blood Glucose Meter/Supplies, NPH insulins, & Novolog.    Patient has pharmacy benefits through [employer] ([employer]). Per insurance, the following are covered and preferred under the plan:      Freestyle Liter meter/strips/lancets - $15.09/$30/$8.18    OneTouch meter/strips/lancets - $22.82/$30/$8.52    Humulin N pens/vials - $30    Humalog pens/vials - $30    The following are not covered under the plan:    Accu-chek meter/supplies    Contour meter/supplies    Novolin N pens/vials    Novolog pens/vials    Insulin Aspart pens/vials    Fiasp pens/vials    Insulin Lispro pens/vials    Admelog pens/vials      Please feel free to contact me with any other test claims, prior authorizations, or insurance questions regarding outpatient medications.     Thanks!      Aurea Tiwari [facility] Discharge Pharmacy Liaison  Pronouns: She/Her/Hers    [facility]  [address] [place] [address] [address] [id number]   [email or web address]  [email or web address]   Phone: [phone]  Pager: [phone]  Fax: [phone]

## 2022-03-09 LAB
ABO/RH(D): NORMAL
ALBUMIN SERPL-MCNC: 2.8 G/DL (ref 3.4–5)
ALBUMIN SERPL-MCNC: 3 G/DL (ref 3.4–5)
ALP SERPL-CCNC: 77 U/L (ref 40–150)
ALP SERPL-CCNC: 87 U/L (ref 40–150)
ALT SERPL W P-5'-P-CCNC: 55 U/L (ref 0–50)
ALT SERPL W P-5'-P-CCNC: 59 U/L (ref 0–50)
ANION GAP SERPL CALCULATED.3IONS-SCNC: 8 MMOL/L (ref 3–14)
ANION GAP SERPL CALCULATED.3IONS-SCNC: 9 MMOL/L (ref 3–14)
ANTIBODY SCREEN: NEGATIVE
AST SERPL W P-5'-P-CCNC: 19 U/L (ref 0–45)
AST SERPL W P-5'-P-CCNC: 20 U/L (ref 0–45)
BILIRUB SERPL-MCNC: 0.3 MG/DL (ref 0.2–1.3)
BILIRUB SERPL-MCNC: 0.3 MG/DL (ref 0.2–1.3)
BUN SERPL-MCNC: 11 MG/DL (ref 7–30)
BUN SERPL-MCNC: 9 MG/DL (ref 7–30)
CALCIUM SERPL-MCNC: 8.5 MG/DL (ref 8.5–10.1)
CALCIUM SERPL-MCNC: 9.2 MG/DL (ref 8.5–10.1)
CHLORIDE BLD-SCNC: 107 MMOL/L (ref 94–109)
CHLORIDE BLD-SCNC: 108 MMOL/L (ref 94–109)
CO2 SERPL-SCNC: 21 MMOL/L (ref 20–32)
CO2 SERPL-SCNC: 21 MMOL/L (ref 20–32)
CREAT SERPL-MCNC: 0.57 MG/DL (ref 0.52–1.04)
CREAT SERPL-MCNC: 0.58 MG/DL (ref 0.52–1.04)
DRVVT SCREEN RATIO: 0.85
ERYTHROCYTE [DISTWIDTH] IN BLOOD BY AUTOMATED COUNT: 13.7 % (ref 10–15)
ERYTHROCYTE [DISTWIDTH] IN BLOOD BY AUTOMATED COUNT: 14.1 % (ref 10–15)
GFR SERPL CREATININE-BSD FRML MDRD: >90 ML/MIN/1.73M2
GFR SERPL CREATININE-BSD FRML MDRD: >90 ML/MIN/1.73M2
GLUCOSE BLD-MCNC: 113 MG/DL (ref 70–99)
GLUCOSE BLD-MCNC: 134 MG/DL (ref 70–99)
GLUCOSE BLDC GLUCOMTR-MCNC: 100 MG/DL (ref 70–99)
GLUCOSE BLDC GLUCOMTR-MCNC: 102 MG/DL (ref 70–99)
GLUCOSE BLDC GLUCOMTR-MCNC: 104 MG/DL (ref 70–99)
GLUCOSE BLDC GLUCOMTR-MCNC: 104 MG/DL (ref 70–99)
GLUCOSE BLDC GLUCOMTR-MCNC: 107 MG/DL (ref 70–99)
GLUCOSE BLDC GLUCOMTR-MCNC: 107 MG/DL (ref 70–99)
GLUCOSE BLDC GLUCOMTR-MCNC: 138 MG/DL (ref 70–99)
HCT VFR BLD AUTO: 36.4 % (ref 35–47)
HCT VFR BLD AUTO: 39.1 % (ref 35–47)
HGB BLD-MCNC: 12.5 G/DL (ref 11.7–15.7)
HGB BLD-MCNC: 13.6 G/DL (ref 11.7–15.7)
INR PPP: 0.97 (ref 0.85–1.15)
LA PPP-IMP: NEGATIVE
LUPUS INTERPRETATION: NORMAL
MAGNESIUM SERPL-MCNC: 5.5 MG/DL (ref 1.6–2.3)
MAGNESIUM SERPL-MCNC: 5.6 MG/DL (ref 1.6–2.3)
MCH RBC QN AUTO: 31.4 PG (ref 26.5–33)
MCH RBC QN AUTO: 31.6 PG (ref 26.5–33)
MCHC RBC AUTO-ENTMCNC: 34.3 G/DL (ref 31.5–36.5)
MCHC RBC AUTO-ENTMCNC: 34.8 G/DL (ref 31.5–36.5)
MCV RBC AUTO: 91 FL (ref 78–100)
MCV RBC AUTO: 92 FL (ref 78–100)
PLATELET # BLD AUTO: 236 10E3/UL (ref 150–450)
PLATELET # BLD AUTO: 296 10E3/UL (ref 150–450)
POTASSIUM BLD-SCNC: 4.2 MMOL/L (ref 3.4–5.3)
POTASSIUM BLD-SCNC: 4.2 MMOL/L (ref 3.4–5.3)
PROT SERPL-MCNC: 6.4 G/DL (ref 6.8–8.8)
PROT SERPL-MCNC: 7.2 G/DL (ref 6.8–8.8)
PTT RATIO: 0.79
RBC # BLD AUTO: 3.98 10E6/UL (ref 3.8–5.2)
RBC # BLD AUTO: 4.3 10E6/UL (ref 3.8–5.2)
SODIUM SERPL-SCNC: 137 MMOL/L (ref 133–144)
SODIUM SERPL-SCNC: 137 MMOL/L (ref 133–144)
SPECIMEN EXPIRATION DATE: NORMAL
THROMBIN TIME: 15.9 SECONDS (ref 13–19)
WBC # BLD AUTO: 12.6 10E3/UL (ref 4–11)
WBC # BLD AUTO: 9.5 10E3/UL (ref 4–11)

## 2022-03-09 PROCEDURE — 99233 SBSQ HOSP IP/OBS HIGH 50: CPT | Performed by: NURSE PRACTITIONER

## 2022-03-09 PROCEDURE — 83735 ASSAY OF MAGNESIUM: CPT | Performed by: STUDENT IN AN ORGANIZED HEALTH CARE EDUCATION/TRAINING PROGRAM

## 2022-03-09 PROCEDURE — 250N000011 HC RX IP 250 OP 636: Performed by: STUDENT IN AN ORGANIZED HEALTH CARE EDUCATION/TRAINING PROGRAM

## 2022-03-09 PROCEDURE — 99231 SBSQ HOSP IP/OBS SF/LOW 25: CPT | Performed by: OBSTETRICS & GYNECOLOGY

## 2022-03-09 PROCEDURE — 250N000013 HC RX MED GY IP 250 OP 250 PS 637: Performed by: STUDENT IN AN ORGANIZED HEALTH CARE EDUCATION/TRAINING PROGRAM

## 2022-03-09 PROCEDURE — 84155 ASSAY OF PROTEIN SERUM: CPT | Performed by: STUDENT IN AN ORGANIZED HEALTH CARE EDUCATION/TRAINING PROGRAM

## 2022-03-09 PROCEDURE — 36415 COLL VENOUS BLD VENIPUNCTURE: CPT | Performed by: STUDENT IN AN ORGANIZED HEALTH CARE EDUCATION/TRAINING PROGRAM

## 2022-03-09 PROCEDURE — 86850 RBC ANTIBODY SCREEN: CPT | Performed by: STUDENT IN AN ORGANIZED HEALTH CARE EDUCATION/TRAINING PROGRAM

## 2022-03-09 PROCEDURE — 120N000002 HC R&B MED SURG/OB UMMC

## 2022-03-09 PROCEDURE — 86901 BLOOD TYPING SEROLOGIC RH(D): CPT | Performed by: STUDENT IN AN ORGANIZED HEALTH CARE EDUCATION/TRAINING PROGRAM

## 2022-03-09 PROCEDURE — 85027 COMPLETE CBC AUTOMATED: CPT | Performed by: STUDENT IN AN ORGANIZED HEALTH CARE EDUCATION/TRAINING PROGRAM

## 2022-03-09 RX ADMIN — MAGNESIUM SULFATE HEPTAHYDRATE 2 G/HR: 40 INJECTION, SOLUTION INTRAVENOUS at 14:21

## 2022-03-09 RX ADMIN — ASPIRIN 81 MG: 81 TABLET, COATED ORAL at 07:56

## 2022-03-09 RX ADMIN — PRENATAL VITAMINS-IRON FUMARATE 27 MG IRON-FOLIC ACID 0.8 MG TABLET 1 TABLET: at 07:56

## 2022-03-09 RX ADMIN — ENOXAPARIN SODIUM 40 MG: 40 INJECTION SUBCUTANEOUS at 19:36

## 2022-03-09 NOTE — PROGRESS NOTES
Patient on IV Mg for seizure ppx at 2g/hr. No concern for Mg toxicity at this time. Most recent Mg level 5.6 at 0008. UOP more than adequate, 1150 mLs since midnight. Serial BP monitoring with mostly low mild range blood pressures, nothing requiring treatment. Repeat AM HELLP ordered. Continuous fetal heart rate monitoring. Will continue to monitor closely.     Senia Ames MD  Obstetrics & Gynecology PGY-3  3/9/22

## 2022-03-09 NOTE — PROGRESS NOTES
[facility]  Progress Note    S:   She is doing well this morning. Headache has resolved this morning. Denies any vision changes now. She denies contractions, vaginal bleeding, chest pain, SOB, RUQ pain, blurry vision. Talked to Endocrine, diabetes education, nutrition, and NICU today.    O:  Vitals:    22 0818 22 0922 22 1219 22 1533   BP:  (!) 147/84 (!) 141/82 (!) 142/87   BP Location:  Left arm     Patient Position:  Semi-Gibbs's     Cuff Size:  Adult Large     Pulse:       Resp:     Temp:  98.6  F (37  C)  99  F (37.2  C)   TempSrc:  Oral  Oral   SpO2:       Weight: 107.5 kg (237 lb)          Gen: Appears well, nad  CV:  RRR, no murmurs  Pulm:  CTAB, no wheezes or crackles  Abd:  Gravid, soft, non-tender  Ext:  Biceps reflexes 1+ b/l, 0 beats clonus b/l, trace LE edema b/l    FHT: Baseline 135, moderate variability, present accelerations, spontaneous decelerations X1 [place] at 120 lasting 2 minutes  [place]: 0 contractions in 10 minutes    I/O:  2800cc today    A/P:  41 year old  at 27w4d by 11w0d US, here for preeclampsia with severe features. Pregnancy is notable for severe FGR and asthma.      # Pre-eclampsia with Severe Features (BP)  - Serial BP monitoring   - IV Antihypertensives prn for sustained severe range blood pressures (>160/>110), s/p IV labetalol 20 mg x1 (3/7 2115)  - HR has been mid 50's s/p labetolol, so consider an alternative anti-HTN medication (hydralazine)  - No PTA antihypertensives  - Labs: ALT 86>79>75, otherwise wnl, UPC <0.05    - Repeat HELLP labs tonight 7pm  - Mag 4 gram bolus ordered               - 2 gram/hour maintenance fluid following bolus through BMZ window               - Continue q4hr mag checks               - Strict I/O, daily weights  - admission until 34wks d/t pre-E with SF, pending earlier declaration     # FGR  - Growth ; EFW 631g, <1%, AC <1%  - UAD 3/7 wnl  - Betamethasone x1 (2023 3/7), anticipate  second dose 3/8 PM  - Continue expectant management through the betamethasone window    #Failed 1-hr Glucose Tolerance Test  - 134 on 3/4  - fasting BGL 3/8   - S/p Endocrine consult  - Recommendations: 8U NPH (received midday), 1:16g CHO and sliding scale insulin    - no history of GDM, DM outside of pregnancy  - diabetic education  - Nutrition consult  - high carb consistent diet     # PNC  - Rh pos, Rubella immune, GCT failed, needs GTT, GBS unknown  - Other prenatal labs wnl  - Imaging: see above and imaging tab                 # FWB:   Cat I tracing, reactive and appropriate for gestational age; transverse by MFM US 3/7  - Continuous Fetal Monitoring on Mag  - BMZ for fetal lung maturity   - NICU for delivery: pending consult likely 3/8  - Intrauterine resuscitative measures francesco Dempsey MD PGY3  Obstetrics & Gynecology  03/08/22

## 2022-03-09 NOTE — PLAN OF CARE
Goal Outcome Evaluation:    Plan of Care Reviewed With: patient     Overall Patient Progress: improving         PT VSS on RA. Denies pain. Denies pre-eclampsia symptoms and signs of magnesium toxicity. Fetal monitoring reassuring. Blood glucose monitoring WDL. PT resting in between cares overnight.

## 2022-03-09 NOTE — PLAN OF CARE
Goal Outcome Evaluation: stable blood pressures      Patient's vital signs are stable. Denies headache, epigastric pain, visual disturbance, bleeding or leaking fluids. Magnesium Sulfate running at 2 grams/hr. Voiding without difficulty. Fetal heart rate and uterine activity see flow sheet. Continue with plan of care.

## 2022-03-09 NOTE — PROGRESS NOTES
[facility]  Progress Note    S:   She is doing well this morning. She felt mildly lightheaded and tremulous immediately after receiving the insulin once yesterday. Denies any vision changes, headache now. She denies contractions, vaginal bleeding, chest pain, SOB, RUQ pain, blurry vision. Talked to Endocrine, diabetes education, nutrition, and NICU yesterday.    O:  Vitals:    22 0513 22 0537 22 0600 22 0803   BP: 123/62   139/74   BP Location:    Left arm   Patient Position:    Semi-Gibbs's   Cuff Size:    Adult Large   Pulse:       Resp: 16   16   Temp: 97.3  F (36.3  C)   98.1  F (36.7  C)   TempSrc: Axillary   Oral   SpO2:   96% 98%   Weight:  106.6 kg (235 lb 1.6 oz)         Gen: Appears well, nad  CV:  RRR, no murmurs  Pulm:  CTAB, no wheezes or crackles  Abd:  Gravid, soft, non-tender  Ext:  Biceps and patellar reflexes 1+ b/l, 0 beats clonus b/l, trace-1+ LE edema b/l    FHT: Baseline 135, moderate variability, present accelerations, absent decels  [place]: 0 contractions in 10 minutes    I/O:  -353 cc today    Labs:  Mg- 5.5  CMP WNL      A/P:  41 year old  at 27w4d by 11w0d US, here for preeclampsia with severe features. Pregnancy is notable for severe FGR and asthma.      # Pre-eclampsia with Severe Features (BP)  - Serial BP monitoring   - IV Antihypertensives prn for sustained severe range blood pressures (>160/>110), s/p IV labetalol 20 mg x1 (3/7 2115)  - HR has been mid 50's s/p labetolol, so consider an alternative anti-HTN medication (hydralazine)  - No PTA antihypertensives  - Labs: ALT 86>75>55, otherwise wnl, UPC <0.05    - Repeat HELLP labs daily  - Mag 2 gram/hour maintenance fluid following bolus through BMZ window    - off at 2030 3/9               - Continue q4hr mag checks               - Strict I/O, daily weights  - admission until 34wks d/t pre-E with SF, pending earlier declaration     # FGR  - Growth ; EFW 631g, <1%,  AC <1%  - UAD 3/7 wnl  - Betamethasone x2 (3/7-3/8)  - Continue expectant management through the betamethasone window    #Failed 1-hr Glucose Tolerance Test  - S/p Endocrine, nutrition, diabetic educator consult  - Recommendations: 8U NPH, 1:16g CHO and sliding scale insulin    - no history of GDM, DM outside of pregnancy  - high carb consistent diet     # PNC  - Rh pos, Rubella immune, GCT failed, needs GTT, GBS unknown  - Other prenatal labs wnl  - Imaging: see above and imaging tab                 # FWB:   Cat I tracing, reactive and appropriate for gestational age; transverse by MFM US 3/7  - Continuous Fetal Monitoring on Mag  - BMZ for fetal lung maturity   - NICU consult complete  - Intrauterine resuscitative measures francesco Frank, MS4  9:00 AM  2022    Physician Attestation    Physician Attestation   I, Laura Rangel, was present with the medical/NOREEN student who participated in the service and in the documentation of the note.  I have verified the history and personally performed the physical exam and medical decision making.  I agree with the assessment and plan of care as documented in the note.      I personally reviewed vital signs, medications, labs, imaging and EFM.    Key findings:  In summary, Pao Pereira is a  at 27w5d admitted with clinical picture consistent with preeclampsia with severe features in context of know severe fetal growth restriction.  Currently receiving magnesium and BMZ course.  Will plan inpatient management through delivery, which is recommended by 34w0d, or as clinically indicated by maternal and fetal status.  Patient currently meets criteria for expectant management and will continue close inpatient management.     Laura Rangel MD  Date of Service (when I saw the patient): 22    Time Spent on this Encounter   I spent 15 minutes on the unit/floor managing the care of Pao Pereira. Over 50% of my time was spent on the following:   -  Counseling the patient and/or family regarding: diagnostic results and prognosis  - Coordination of care with the: nurse and patient    In summary, Pao Pereira is a  at 27w5d admitted with clinical picture consistent with preeclampsia with severe features in context of know severe fetal growth restriction.  Currently receiving magnesium and BMZ course.  Will plan inpatient management through delivery, which is recommended by 34w0d, or as clinically indicated by maternal and fetal status.  Patient currently meets criteria for expectant management and will continue close inpatient management.     Laura Rangel MD

## 2022-03-09 NOTE — PLAN OF CARE
Goal Outcome Evaluation: Pt no complains in continuous monitoring tolerating well magnesium IV. Baby AGA on monitor no ctx's noted. Ambulating in room and to the bathroom. Shower, linen change. Pt states feeling well. BPs within parameters, denies vision changes, headache. Mother and father came from [place] to take care her kids and house.

## 2022-03-09 NOTE — PROGRESS NOTES
"   03/09/22 1121   Child Life   Location   (Antepartum)   Intervention Sibling Support;Family Support;Referral/Consult;Initial Assessment  (Consult by NICU fellow)   Family Support Comment Certified Child Life Specialist introduced self and services to patient, Pao, at the bedside. Supportive conversation and listening was provided re: admission, current goals of care, family coping, and children's (daughter 10y and son 7y \"Roshan\") understanding of hospitalization.     Patient shared the age-appropriate ways that she has helped son & daughter understand evolving needs and care. Family has used open, honest, and age-appropriate communication throughout pregnancy; CCLS validated and encouraged the continued efforts.     Patient is interested in ongoing resources and check-ins to support connection, understanding of hospitalization, and coping needs. Family is anticipating spring break in a few weeks which would be another appropriate time to connect via FaceTime to engage in the hospital. Patient reports confidence in both set of grandparents to provide social support, routine and normalcy to children.     CCLS acknowledged the many, intentional efforts made thus far to support children, and the opportunities to compliment those going forward. Patient shared concern about \"ruining things (by doing the wrong thing or sharing too much)\" yet made appropriate connections about how information sharing has been effective for her own coping and she appropriately wants that for her children as well. CCLS provided developmental information re: age-appropriate language and its impact on positive coping.     No acute coping needs identified at this time. CCLS will continue to follow/support.   Sibling Support Comment CCLS will continue to assess needs as hospitalization evolves. Family is anticipating NICU admission and has appropriately prepared son and daughter for this as well.   Outcomes/Follow Up Continue to " Follow/Support  (ASCOM *[id number])

## 2022-03-09 NOTE — PROGRESS NOTES
[facility]  Magnesium Check Note    Subjective:     Patient is feeling well.  Face is flushed, but patient denies feeling warm.  Denies headache, vision changes/spots in vision, chest pain, shortness of breath, RUQ or epigastric pain.     Objective:  Patient Vitals for the past 4 hrs:   BP Temp Temp src Resp SpO2   22 1300 -- -- -- -- 100 %   22 1239 130/76 99.1  F (37.3  C) Oral 17 100 %     Gen: NAD  CV:  RRR  Pulm:  CTAB, no wheezes or crackles, normal respiratory effort  Abd:  Gravid, soft, non-tender  Ext:  BUE DTRs 2+, 1+ edema    FHT: Baseline 140s, moderate variability, accelerations present, no decelerations  [place]: 0 contractions in 10 minutes    I/O:  I/O last 3 completed shifts:  In: 6910.5 [P.O.:4520; I.V.:2390.5]  Out: 6100 [Urine:6100]    Assessment/Plan:  Pao Pereira is a 41 year old  at 27w5d by 11w0d US, admitted for expectant management of preeclampsia with severe features.    Preeclampsia with severe features   - No signs or symptoms of worsening pre-eclampsia or magnesium toxicity.   - Symptoms: None   - Mag sulfate for seizure prophylaxis: 2g/hr   - BP: Currently normotensive. Continue to monitor. Alert if sustained SBP >160 and/or DBP >110 for acute treatment.   - UOP:  ml/hr   - HELLP labs ALT 86>75>55, otherwise wnl, UPC <0.05    - Next clinical mag check at 1800    Cheyenne Nguyen MD  Maternal Fetal Medicine Fellow  3/9/2022 3:02 PM

## 2022-03-09 NOTE — PROGRESS NOTES
IP Diabetes Management  Daily Note           Assessment and Plan:   HPI: Pt is a 41 year old  at 27w4d by 11w0d US, here for preeclampsia without severe features.  Given betamethasone 3/7-3/8, failed outpatient glucose tolerance screen.        Assessment:   1) possible GDM, failed outpatient 1 hour glucose tolerance screen  2) steroid induced hyperglycemia    Plan:    -NPH insulin 10 units once this AM   -NPH 10 units at 8pm tonight   -continue Novolog 1:16gCHO coverage with meals and snacks/supplements    -Novolog custom sliding scale insulin, 1 per 50>100 AC, >120 HS   -BG monitoring TID AC, 2 hours PP, HS, 0200   -hypoglycemia protocol   -carb counting protocol w/75g CHO diet   -diabetes educator consult   -nutrition consult---completed 3/8.    Outpatient follow up: TBD  Plan discussed with patient, bedside RN, and primary team.        Interval History and Assessment: interval glucose trend reviewed:         BG is at target post prandially.  Fasting BG overnight was 138.  Will give 10 units NPH this AM and at 8pm tonight.  Betamethasone effect still present into tomorrow afternoon.  Pao is doing well otherwise, really motivated to make good food choices.  Awaiting CDE today for education.      GFR >90, ALT 59, other LFTS WNL.  WBC 12.6.     Current nutritional intake and type: Orders Placed This Encounter      High Consistent Carb (75 g CHO per Meal) Diet      Planned Procedures/surgeries: none known  Steroid planning: s/p betamethasone 3/7 and 3/8  D5W-containing solutions/medications: none    PTA Diabetes Regimen: none    Discharge Planning: TBD, here until delivery           Diabetes History:   Type of Diabetes: Gestational Diabetes Mellitus, Steroid Induced Diabetes Mellitus  Lab Results   Component Value Date    A1C 4.6 2022    A1C 4.9 2019              Review of Systems:     The Review of Systems is negative other than noted in the Interval History.           Medications:     Current  Facility-Administered Medications   Medication     acetaminophen (TYLENOL) tablet 650 mg     aspirin EC tablet 81 mg     calcium gluconate 10 % injection 1 g     carboprost (HEMABATE) injection 250 mcg     glucose gel 15-30 g    Or     dextrose 50 % injection 25-50 mL    Or     glucagon kit 1 mg     glucose gel 15-30 g    Or     dextrose 50 % injection 25-50 mL    Or     glucagon kit 1 mg     glucose gel 15-30 g    Or     dextrose 50 % injection 25-50 mL    Or     glucagon kit 1 mg     hydrALAZINE (APRESOLINE) injection 10 mg     ketorolac (TORADOL) injection 30 mg    Or     ketorolac (TORADOL) injection 30 mg    Or     ibuprofen (ADVIL/MOTRIN) tablet 600 mg     insulin aspart (NovoLOG) injection (RAPID ACTING)     insulin aspart (NovoLOG) injection (RAPID ACTING)     insulin aspart (NovoLOG) injection (RAPID ACTING)     insulin aspart (NovoLOG) injection (RAPID ACTING)     insulin NPH injection 10 Units     labetalol (NORMODYNE/TRANDATE) algorithm-medication instruction     labetalol (NORMODYNE/TRANDATE) injection 20-80 mg     lactated ringers BOLUS 1,000 mL    Or     lactated ringers BOLUS 500 mL     lactated ringers infusion     lactated ringers infusion     lidocaine 1 % 0.1-20 mL     LORazepam (ATIVAN) injection 2 mg     magnesium sulfate 2 g in water intermittent infusion     magnesium sulfate 2 g in water intermittent infusion     magnesium sulfate 4 g in 100 mL sterile water (premade)     magnesium sulfate 4 g in 100 mL sterile water (premade)     magnesium sulfate infusion     magnesium sulfate injection 10 g     magnesium sulfate injection 10 g     methylergonovine (METHERGINE) injection 200 mcg     metoclopramide (REGLAN) injection 10 mg    Or     metoclopramide (REGLAN) tablet 10 mg     misoprostol (CYTOTEC) tablet 400 mcg    Or     misoprostol (CYTOTEC) tablet 800 mcg     naloxone (NARCAN) injection 0.2 mg    Or     naloxone (NARCAN) injection 0.4 mg    Or     naloxone (NARCAN) injection 0.2 mg    Or      naloxone (NARCAN) injection 0.4 mg     ondansetron (ZOFRAN-ODT) ODT tab 4 mg    Or     ondansetron (ZOFRAN) injection 4 mg     oxytocin (PITOCIN) 30 units in 500 mL 0.9% NaCl infusion     oxytocin (PITOCIN) 30 units in 500 mL 0.9% NaCl infusion     oxytocin (PITOCIN) injection 10 Units     oxytocin (PITOCIN) injection 10 Units     prenatal multivitamin w/iron per tablet 1 tablet     prochlorperazine (COMPAZINE) injection 10 mg    Or     prochlorperazine (COMPAZINE) tablet 10 mg    Or     prochlorperazine (COMPAZINE) suppository 25 mg     tranexamic acid 1 g in 100 mL NS IV bag (premix)            Physical Exam:    /62   Pulse 79   Temp 97.3  F (36.3  C) (Axillary)   Resp 16   Wt 106.6 kg (235 lb 1.6 oz)   SpO2 100%   BMI 43.70 kg/m    General: pleasant, in no distress.  Resting in bed.   HEENT: normocephalic, atraumatic. Oral mucous membranes moist.   Lungs: unlabored respiration, no cough  ABD: rounded, nondistended  Skin: warm and dry, no obvious lesions  MSK:  moves all extremities  Lymp:  no LE edema   Mental status:  alert, oriented to self, place, time  Psych:  affect, calm and appropriate interaction             Data:     Recent Labs   Lab 03/09/22  1142 03/09/22  1110 03/09/22  0937 03/09/22  0725 03/09/22  0629 03/08/22  2339   * 113* 104* 138* 134* 120*     Lab Results   Component Value Date    WBC 9.5 03/09/2022    WBC 12.1 (H) 03/08/2022    WBC 10.3 03/08/2022    HGB 12.5 03/09/2022    HGB 13.0 03/08/2022    HGB 13.7 03/08/2022    HCT 36.4 03/09/2022    HCT 38.0 03/08/2022    HCT 39.3 03/08/2022    MCV 92 03/09/2022    MCV 91 03/08/2022    MCV 91 03/08/2022     03/09/2022     03/08/2022     03/08/2022     Lab Results   Component Value Date     (H) 03/08/2022     (H) 03/08/2022     (H) 03/08/2022     No results found for: BUN, CREATININE  Lab Results   Component Value Date    TSH 0.93 03/08/2022    TSH 3.04 04/23/2019     Lab Results    Component Value Date    AST 23 03/08/2022    AST 28 03/08/2022    AST 29 03/08/2022    ALT 67 (H) 03/08/2022    ALT 75 (H) 03/08/2022    ALT 76 (H) 03/08/2022           35 minutes spent on the date of the encounter doing chart review, history and exam, documentation and further activities per the note      Over 50% of my time on the unit was spent counseling the patient and/or coordinating care regarding acute hyperglycemia management.  See note for details.    To contact Endocrine Diabetes service:   From 8AM-4PM: page inpatient diabetes provider that is following the patient  For questions or updates from 4PM-8AM: page the diabetes job code for on call fellow: [phone]    KADEEM De Oliveira, CNP  Inpatient Diabetes Management Service  Pager [phone]

## 2022-03-09 NOTE — PROGRESS NOTES
Pt update:    Consented for c/s, blood transfusion. All questions answered.   She has a h/o endometriosis, s/p lap surgery in 2019 for cyst removal: bilateral cysts returned about 6 weeks later. Most recent US imaging in Jan 2022 shows R-sided complex cyst about 7cm, L-sided simple cyst about 2.5cm. She additionally had post-operative infection in the LLQ port site requiring oral antibiotics.   Additionally she agreed to lovenox ppx.     Katina Frank  10:45 AM  March 9, 2022

## 2022-03-09 NOTE — CONSULTS
Diabetes Educator consult received for Pao Pereira, age 41,  at 27w4d by 11w0d US, here for preeclampsia without severe features.  Given betamethasone 3/7-3/8, failed outpatient 1 hour glucose tolerance screen and hyperglycemia secondary to steroids.  Proceeding for now as GDM.    Met with Pao and her mother in her room this afternoon.  Pleasant young woman and eager to learn about GDM.  Education provided included:    1.  What is gestational diabetes, role of steroids with insulin resistance and hyperglycemia.  GDM ends with delivery.  Did state some do go on to develop DM, but healthy lifestyle, including healthy eating and being active, help prevent this from occurring.  2.  Target glucoses for GDM:  Fasting < 95 mg/dL; 1 hr PP < 140 mg/dL; 2 hr PP < 120 mg/dL  Explained why these targets, effect hyperglycemia has on fetus and risk of  hypoglycemia.  3.  Glucose monitoring with One Touch Verio Reflect meter.  Meter and starter kit provided to patient.  Discussed frequency of BG testing fasting, before meals, 2 hours postprandial and bedtime.  Demonstrated use of meter.  Pao stated she believes the plan now is for her to stay in hospital until she delivers so she declined to do her own fingerstick right now as was just done by RN, 100 mg/dL 2 hr pp.  She states she will be able to do this and her mother stated she had done glucose testing on a family member and knows what to do.  4.  Action, peak, dosage, duration of Humulin NPH and Humalog (Lispro) insulins, Humalog being the same as Novolog, just different company. Discussed insulin:cho ratio and correction factor, use of correction scale.  Concept of basal and bolus insulin.    5.  Injection technique with Humulin NPH kwikpen and Humalog kwikpen and B-D 4 mm lesly pen needles.  Practiced with training pens and injection pad placing pen needle, mixing NPH suspension, priming with 2 units, dialing dose, injection technique with site  selection and rotation, and safe needle removal and disposal.  Discussed storage of insulin and length of time pen can be used.  6.  RD saw patient yesterday.  Will ask RD to provide the Carboydrate Counting booklet.  Suggested Calorie [place] or My Fitness Pal apps to assist with counting grams of CHO.  7.  Signs/symptoms/causes/treatment of hypoglycemia, Rule of 15, < 70 mg/dL low.  Explained some women have glucoses in 60's and not symptomatic but would like her to be in a fairly steady state at  mg/dL during day.  8.  Provided Gestational Diabetes Booklet.    All questions were answered for now.    Informed RN and NP of education being completed.    KADEEM Shankar  Diabetes Clinical Nurse Specialist/[facility]  [phone]

## 2022-03-09 NOTE — PROGRESS NOTES
Patient on IV Mg for seizure ppx at 2g/hr. No concern for Mg toxicity. Most recent Mg level 5.5 and UOP more than adequate, 1000 mLs over last 2 hours. Serial BP monitoring with mostly low mild range blood pressures recently, nothing requiring treatment.  PM HELLP stable, ALT with slight downtrend and other labs unremarkable, repeat in AM. Continuous fetal heart rate monitoring. Will continue to monitor closely.     Senia Ames MD  Obstetrics & Gynecology PGY-3  10:18 PM 3/8/2022

## 2022-03-10 ENCOUNTER — APPOINTMENT (OUTPATIENT)
Dept: ULTRASOUND IMAGING | Facility: CLINIC | Age: 42
End: 2022-03-10
Attending: STUDENT IN AN ORGANIZED HEALTH CARE EDUCATION/TRAINING PROGRAM
Payer: COMMERCIAL

## 2022-03-10 ENCOUNTER — APPOINTMENT (OUTPATIENT)
Dept: PHYSICAL THERAPY | Facility: CLINIC | Age: 42
End: 2022-03-10
Attending: STUDENT IN AN ORGANIZED HEALTH CARE EDUCATION/TRAINING PROGRAM
Payer: COMMERCIAL

## 2022-03-10 LAB
ALBUMIN SERPL-MCNC: 2.4 G/DL (ref 3.4–5)
ALP SERPL-CCNC: 67 U/L (ref 40–150)
ALT SERPL W P-5'-P-CCNC: 39 U/L (ref 0–50)
ANION GAP SERPL CALCULATED.3IONS-SCNC: 5 MMOL/L (ref 3–14)
AST SERPL W P-5'-P-CCNC: 15 U/L (ref 0–45)
BILIRUB SERPL-MCNC: 0.2 MG/DL (ref 0.2–1.3)
BUN SERPL-MCNC: 15 MG/DL (ref 7–30)
CALCIUM SERPL-MCNC: 9.5 MG/DL (ref 8.5–10.1)
CHLORIDE BLD-SCNC: 111 MMOL/L (ref 94–109)
CO2 SERPL-SCNC: 23 MMOL/L (ref 20–32)
CREAT SERPL-MCNC: 0.59 MG/DL (ref 0.52–1.04)
ERYTHROCYTE [DISTWIDTH] IN BLOOD BY AUTOMATED COUNT: 14.1 % (ref 10–15)
GFR SERPL CREATININE-BSD FRML MDRD: >90 ML/MIN/1.73M2
GLUCOSE BLD-MCNC: 89 MG/DL (ref 70–99)
GLUCOSE BLDC GLUCOMTR-MCNC: 101 MG/DL (ref 70–99)
GLUCOSE BLDC GLUCOMTR-MCNC: 64 MG/DL (ref 70–99)
GLUCOSE BLDC GLUCOMTR-MCNC: 74 MG/DL (ref 70–99)
GLUCOSE BLDC GLUCOMTR-MCNC: 77 MG/DL (ref 70–99)
GLUCOSE BLDC GLUCOMTR-MCNC: 77 MG/DL (ref 70–99)
GLUCOSE BLDC GLUCOMTR-MCNC: 80 MG/DL (ref 70–99)
GLUCOSE BLDC GLUCOMTR-MCNC: 80 MG/DL (ref 70–99)
HCT VFR BLD AUTO: 32.5 % (ref 35–47)
HGB BLD-MCNC: 11.1 G/DL (ref 11.7–15.7)
MCH RBC QN AUTO: 31.6 PG (ref 26.5–33)
MCHC RBC AUTO-ENTMCNC: 34.2 G/DL (ref 31.5–36.5)
MCV RBC AUTO: 93 FL (ref 78–100)
PLATELET # BLD AUTO: 207 10E3/UL (ref 150–450)
POTASSIUM BLD-SCNC: 4.2 MMOL/L (ref 3.4–5.3)
PROT SERPL-MCNC: 5.7 G/DL (ref 6.8–8.8)
RBC # BLD AUTO: 3.51 10E6/UL (ref 3.8–5.2)
SODIUM SERPL-SCNC: 139 MMOL/L (ref 133–144)
WBC # BLD AUTO: 9.3 10E3/UL (ref 4–11)

## 2022-03-10 PROCEDURE — 76820 UMBILICAL ARTERY ECHO: CPT | Mod: 26 | Performed by: OBSTETRICS & GYNECOLOGY

## 2022-03-10 PROCEDURE — 97161 PT EVAL LOW COMPLEX 20 MIN: CPT | Mod: GP | Performed by: PHYSICAL THERAPIST

## 2022-03-10 PROCEDURE — 36415 COLL VENOUS BLD VENIPUNCTURE: CPT | Performed by: STUDENT IN AN ORGANIZED HEALTH CARE EDUCATION/TRAINING PROGRAM

## 2022-03-10 PROCEDURE — 99231 SBSQ HOSP IP/OBS SF/LOW 25: CPT | Mod: 25 | Performed by: OBSTETRICS & GYNECOLOGY

## 2022-03-10 PROCEDURE — 120N000002 HC R&B MED SURG/OB UMMC

## 2022-03-10 PROCEDURE — 76820 UMBILICAL ARTERY ECHO: CPT

## 2022-03-10 PROCEDURE — 99232 SBSQ HOSP IP/OBS MODERATE 35: CPT | Performed by: NURSE PRACTITIONER

## 2022-03-10 PROCEDURE — 250N000011 HC RX IP 250 OP 636: Performed by: STUDENT IN AN ORGANIZED HEALTH CARE EDUCATION/TRAINING PROGRAM

## 2022-03-10 PROCEDURE — 80053 COMPREHEN METABOLIC PANEL: CPT | Performed by: STUDENT IN AN ORGANIZED HEALTH CARE EDUCATION/TRAINING PROGRAM

## 2022-03-10 PROCEDURE — 250N000013 HC RX MED GY IP 250 OP 250 PS 637: Performed by: STUDENT IN AN ORGANIZED HEALTH CARE EDUCATION/TRAINING PROGRAM

## 2022-03-10 PROCEDURE — 85027 COMPLETE CBC AUTOMATED: CPT | Performed by: STUDENT IN AN ORGANIZED HEALTH CARE EDUCATION/TRAINING PROGRAM

## 2022-03-10 PROCEDURE — 76815 OB US LIMITED FETUS(S): CPT | Mod: 26 | Performed by: OBSTETRICS & GYNECOLOGY

## 2022-03-10 PROCEDURE — 97110 THERAPEUTIC EXERCISES: CPT | Mod: GP | Performed by: PHYSICAL THERAPIST

## 2022-03-10 RX ADMIN — ASPIRIN 81 MG: 81 TABLET, COATED ORAL at 08:28

## 2022-03-10 RX ADMIN — ACETAMINOPHEN 650 MG: 325 TABLET, FILM COATED ORAL at 14:53

## 2022-03-10 RX ADMIN — PRENATAL VITAMINS-IRON FUMARATE 27 MG IRON-FOLIC ACID 0.8 MG TABLET 1 TABLET: at 08:28

## 2022-03-10 RX ADMIN — ENOXAPARIN SODIUM 40 MG: 40 INJECTION SUBCUTANEOUS at 20:07

## 2022-03-10 NOTE — PROGRESS NOTES
[facility]  Progress Note    S:   She is doing well this morning. Had one non-sustained severe range BP yesterday, during which time she felt pressure in her ears.  Otherwise, feeling well. Denies any vision changes, headache.  She denies contractions, vaginal bleeding, chest pain, SOB, RUQ pain, blurry vision. Talked to Endocrine, diabetes education, nutrition, and NICU yesterday.    O:  Vitals:    03/09/22 2209 03/10/22 0217 03/10/22 0623 03/10/22 0624   BP: 126/58 107/55 116/68    BP Location: Left arm Left arm Left arm    Patient Position: Semi-Gibbs's Semi-Gibbs's Semi-Gibbs's    Cuff Size: Adult Large Adult Large Adult Large    Pulse:  70  72   Resp:  15 16    Temp:  98.8  F (37.1  C) 98.6  F (37  C)    TempSrc:  Oral Oral    SpO2:       Weight:         Gen: Appears well, nad  CV:  RRR, no murmurs  Pulm:  CTAB, no wheezes or crackles  Abd:  Gravid, soft, non-tender  Ext:  Biceps and patellar reflexes 1+ b/l, 0 beats clonus b/l, trace-1+ LE edema b/l    FHT: Baseline 135, moderate variability, present accelerations, absent decels  [place]: 0 contractions in 10 minutes    I/O:  -353 cc today    Labs:  Component      Latest Ref Rng & Units 3/10/2022   Sodium      133 - 144 mmol/L 139   Potassium      3.4 - 5.3 mmol/L 4.2   Chloride      94 - 109 mmol/L 111 (H)   Carbon Dioxide      20 - 32 mmol/L 23   Anion Gap      3 - 14 mmol/L 5   Urea Nitrogen      7 - 30 mg/dL 15   Creatinine      0.52 - 1.04 mg/dL 0.59   Calcium      8.5 - 10.1 mg/dL 9.5   Glucose      70 - 99 mg/dL 89   Alkaline Phosphatase      40 - 150 U/L 67   AST      0 - 45 U/L 15   ALT      0 - 50 U/L 39   Protein Total      6.8 - 8.8 g/dL 5.7 (L)   Albumin      3.4 - 5.0 g/dL 2.4 (L)   Bilirubin Total      0.2 - 1.3 mg/dL 0.2   GFR Estimate      >60 mL/min/1.73m2 >90   WBC      4.0 - 11.0 10e3/uL 9.3   RBC Count      3.80 - 5.20 10e6/uL 3.51 (L)   Hemoglobin      11.7 - 15.7 g/dL 11.1 (L)   Hematocrit      35.0 - 47.0 %  32.5 (L)   MCV      78 - 100 fL 93   MCH      26.5 - 33.0 pg 31.6   MCHC      31.5 - 36.5 g/dL 34.2   RDW      10.0 - 15.0 % 14.1   Platelet Count      150 - 450 10e3/uL 207       A/P:  41 year old  at 27w6d by 11w0d US, here for preeclampsia with severe features. Pregnancy is notable for severe FGR and asthma.      # Pre-eclampsia with Severe Features (BP)  - Serial BP monitoring   - IV Antihypertensives prn for sustained severe range blood pressures (>160/>110), s/p IV labetalol 20 mg x1 (3/7 2115)  - HR has been mid 50's s/p labetolol, so consider an alternative anti-HTN medication (hydralazine)  - No PTA antihypertensives  - Labs: ALT 86>75>55, otherwise wnl, UPC <0.05    - Repeat HELLP labs daily  - s/p Mag infusion through BMZ window    - off at 2030 3/9               - Strict I/O, daily weights  - admission until 34wks d/t pre-E with SF, pending earlier declaration     # FGR  - Growth ; EFW 631g, <1%, AC <1%  - UAD 3/7 wnl  - Betamethasone x2 (3/7-3/8)  - Continue expectant management through the betamethasone window    #Failed 1-hr Glucose Tolerance Test  - S/p Endocrine, nutrition, diabetic educator consult  - Recommendations: 8U NPH, 1:16g CHO and sliding scale insulin    - no history of GDM, DM outside of pregnancy  - high carb consistent diet     # PNC  - Rh pos, Rubella immune, GCT failed, needs GTT, GBS unknown  - Other prenatal labs wnl  - Imaging: see above and imaging tab                 # FWB:   Cat I tracing, reactive and appropriate for gestational age; transverse by M US 3/7  - Continuous Fetal Monitoring on Mag  - s/p BMZ for fetal lung maturity   - NICU consult complete  - Intrauterine resuscitative measures prn    Laura Rangel MD  Date of Service (when I saw the patient): 03/10/22    Time Spent on this Encounter   I spent 15 minutes on the unit/floor managing the care of Pao Pereira. Over 50% of my time was spent on the following:   - Counseling the patient and/or family  regarding: diagnostic results and prognosis  - Coordination of care with the: nurse and patient    In summary, Pao Pereira is a  at 27w6d admitted with clinical picture consistent with preeclampsia with severe features in context of know severe fetal growth restriction.  S/P magnesium and BMZ course.  Will plan inpatient management through delivery, which is recommended by 34w0d, or as clinically indicated by maternal and fetal status.  Patient currently meets criteria for expectant management and will continue close inpatient management.     Laura Rangel MD

## 2022-03-10 NOTE — PLAN OF CARE
Goal Outcome Evaluation:    Patient denies signs and symptoms of worsening preeclampsia, vital signs stable, and adequate output overnight. Continuous EFM overnight with difficulty due to sleep positions and habitus. Patient reported difficulties staying asleep due to EFM. Patient currently sleeping this am.

## 2022-03-10 NOTE — PLAN OF CARE
Goal Outcome Evaluation:      Pt is  at 27.6 wks here with Pre E w SF; SFGR, GDM - insulin. H/o NSVDs x2 and asthma.  Here with mother, Aisha.  Pt had morning US.  Seen by the M Team for rounds.  Last had BMZ 3/7 and 3/8.  Pt reports no s/s of PTL. Intact. No vag bleeding.  +FM.  Placed on monitors at 1335.  Pt reported prior to placement feeling x1 ctx -nonpainful tightness.  Per pt, she has felt before prior to admit.  No reported s/s of pre E.  No HA, vision changes or epigastric pain.  AIDEN Santiago PA updated on BGs and insulin adjusted per her orders/phone.  SW asked to see pt as she has questions re: parking etc. Pt updated that children are not allowed into the lobby - per Shirley Daley, Charge RN.  MD notified and pt may go outside in wheelchair.  Pt seen by PT earlier who encouraged movement - clarified with MD not strenous - pt updated and states understanding.  Call light is within reach.  Pt comfortable and stable this shift.

## 2022-03-10 NOTE — PROGRESS NOTES
IP Diabetes Management  Daily Note           Assessment and Plan:   HPI: Pt is a 41 year old  at 27w4d by 11w0d US, here for preeclampsia without severe features.  Given betamethasone 3/7-3/8, failed outpatient glucose tolerance screen.        Assessment:   1) possible GDM, failed outpatient 1 hour glucose tolerance screen  2) steroid induced hyperglycemia    Plan:    -NPH insulin 6  units once this AM at 0900   -no further NPH---trend BG to assess needs with steroid affect waning   -reduce Novolog to 1:20g from 1:16gCHO coverage with meals and snacks/supplements today--will hold starting 3/10 at 1300 for BG 77    -continue Novolog custom sliding scale insulin, 1 per 50>100 AC, >120 HS   -BG monitoring TID AC, 2 hours PP, HS, 0200   -hypoglycemia protocol   -carb counting protocol w/75g CHO diet   -diabetes educator consult   -nutrition consult---completed 3/8.    Outpatient follow up: TBD  Plan discussed with patient, bedside RN, and primary team through this note.        Interval History and Assessment: interval glucose trend reviewed:         BG at or near targets, expecially post prandially.  Fasting is 77-89 this AM.  Will reduce AM NPH to 6 units, decrease novolog to 1:20g CHO coverage today---update at 1230, BG 77 prior to lunch--will hold novolog CHO coverage.  Keep on sliding scale insulin and trend BG.  No further NPH planned.    Pao has no complaints, eating well.      GFR >90, LFTS WNL.  Hgb 11.1.     Current nutritional intake and type: Orders Placed This Encounter      High Consistent Carb (75 g CHO per Meal) Diet      Planned Procedures/surgeries: none known  Steroid planning: s/p betamethasone 3/7 and 3/8  D5W-containing solutions/medications: none    PTA Diabetes Regimen: none    Discharge Planning: TBD, here until delivery           Diabetes History:   Type of Diabetes: Gestational Diabetes Mellitus, Steroid Induced Diabetes Mellitus  Lab Results   Component Value Date    A1C 4.6  03/08/2022    A1C 4.9 04/23/2019              Review of Systems:     The Review of Systems is negative other than noted in the Interval History.           Medications:     Current Facility-Administered Medications   Medication     acetaminophen (TYLENOL) tablet 650 mg     aspirin EC tablet 81 mg     calcium gluconate 10 % injection 1 g     glucose gel 15-30 g    Or     dextrose 50 % injection 25-50 mL    Or     glucagon kit 1 mg     glucose gel 15-30 g    Or     dextrose 50 % injection 25-50 mL    Or     glucagon kit 1 mg     glucose gel 15-30 g    Or     dextrose 50 % injection 25-50 mL    Or     glucagon kit 1 mg     enoxaparin ANTICOAGULANT (LOVENOX) injection 40 mg     hydrALAZINE (APRESOLINE) injection 10 mg     insulin aspart (NovoLOG) injection (RAPID ACTING)     insulin aspart (NovoLOG) injection (RAPID ACTING)     insulin aspart (NovoLOG) injection (RAPID ACTING)     insulin aspart (NovoLOG) injection (RAPID ACTING)     insulin NPH injection 8 Units     labetalol (NORMODYNE/TRANDATE) algorithm-medication instruction     labetalol (NORMODYNE/TRANDATE) injection 20-80 mg     lactated ringers BOLUS 1,000 mL    Or     lactated ringers BOLUS 500 mL     lactated ringers infusion     LORazepam (ATIVAN) injection 2 mg     magnesium sulfate 2 g in water intermittent infusion     magnesium sulfate 4 g in 100 mL sterile water (premade)     magnesium sulfate injection 10 g     metoclopramide (REGLAN) injection 10 mg    Or     metoclopramide (REGLAN) tablet 10 mg     ondansetron (ZOFRAN-ODT) ODT tab 4 mg    Or     ondansetron (ZOFRAN) injection 4 mg     prenatal multivitamin w/iron per tablet 1 tablet     prochlorperazine (COMPAZINE) injection 10 mg    Or     prochlorperazine (COMPAZINE) tablet 10 mg    Or     prochlorperazine (COMPAZINE) suppository 25 mg            Physical Exam:    /68   Pulse 70   Temp 98.8  F (37.1  C) (Oral)   Resp 15   Wt 106.6 kg (235 lb 1.6 oz)   SpO2 100%   BMI 43.70 kg/m     General: pleasant, in no distress.    HEENT: hearing intact to conversational volume  Lungs: unlabored respiration, no cough  Mental status:  alert, oriented to self, place, time  Psych:  affect, calm and appropriate interaction             Data:     Recent Labs   Lab 03/10/22  0619 03/10/22  0219 03/09/22  2207 03/09/22  2044 03/09/22  1810 03/09/22  1354   GLC 89 101* 102* 107* 107* 100*     Lab Results   Component Value Date    WBC 9.3 03/10/2022    WBC 12.6 (H) 03/09/2022    WBC 9.5 03/09/2022    HGB 11.1 (L) 03/10/2022    HGB 13.6 03/09/2022    HGB 12.5 03/09/2022    HCT 32.5 (L) 03/10/2022    HCT 39.1 03/09/2022    HCT 36.4 03/09/2022    MCV 93 03/10/2022    MCV 91 03/09/2022    MCV 92 03/09/2022     03/10/2022     03/09/2022     03/09/2022     Lab Results   Component Value Date     03/10/2022     03/09/2022     03/09/2022    POTASSIUM 4.2 03/10/2022    POTASSIUM 4.2 03/09/2022    POTASSIUM 4.2 03/09/2022    CHLORIDE 111 (H) 03/10/2022    CHLORIDE 107 03/09/2022    CHLORIDE 108 03/09/2022    CO2 23 03/10/2022    CO2 21 03/09/2022    CO2 21 03/09/2022    GLC 89 03/10/2022     (H) 03/10/2022     (H) 03/09/2022     Lab Results   Component Value Date    BUN 15 03/10/2022    BUN 11 03/09/2022    BUN 9 03/09/2022     Lab Results   Component Value Date    TSH 0.93 03/08/2022    TSH 3.04 04/23/2019     Lab Results   Component Value Date    AST 15 03/10/2022    AST 19 03/09/2022    AST 20 03/09/2022    ALT 39 03/10/2022    ALT 59 (H) 03/09/2022    ALT 55 (H) 03/09/2022    ALKPHOS 67 03/10/2022    ALKPHOS 87 03/09/2022    ALKPHOS 77 03/09/2022           25 minutes spent on the date of the encounter doing chart review, history and exam, documentation and further activities per the note      Over 50% of my time on the unit was spent counseling the patient and/or coordinating care regarding acute hyperglycemia management.  See note for details.    To contact Endocrine  Diabetes service:   From 8AM-4PM: page inpatient diabetes provider that is following the patient  For questions or updates from 4PM-8AM: page the diabetes job code for on call fellow: [phone]    KADEEM De Oliveira, CNP  Inpatient Diabetes Management Service  Pager [phone]

## 2022-03-10 NOTE — PROVIDER NOTIFICATION
03/10/22 0927   Provider Notification   Provider Name/Title AIDEN Santiago   Method of Notification Phone   with 77 bg. nph reduced to 6 unit(s) and novolog 1 unit(s) per recently updated orders.

## 2022-03-10 NOTE — PROVIDER NOTIFICATION
03/09/22 1811   Provider Notification   Provider Name/Title Dr Ames   Method of Notification In Department   Request Evaluate - Remote   Notification Reason Decels  (PD x1 to 90 for 3 min)   Plan is to to continuous monitoring over night.

## 2022-03-10 NOTE — PROGRESS NOTES
03/09/22 0647   Quick Adds   Type of Visit Initial PT Evaluation   Living Environment   People in Home spouse;child(deandra), dependent   Self-Care   Usual Activity Tolerance excellent   Current Activity Tolerance moderate   Regular Exercise Yes   Activity/Exercise Type   (pool jogging)   Exercise Amount/Frequency 3-5 times/wk   Equipment Currently Used at Home none   General Information   Onset of Illness/Injury or Date of Surgery   (prolonged hospialization referral)   Referring Physician Dr HEAVENLY Padgett MD   Patient/Family Therapy Goals Statement (PT) prevent atrophy, deconditioning   Pertinent History of Current Problem (include personal factors and/or comorbidities that impact the POC) Pt is a 41 year old active fem, mother of 2 Dx w preclampsia and admitted to [facility] for fetal monitoring 2/2 HBP.    Cognition   Affect/Mental Status (Cognition) WFL   Orientation Status (Cognition) oriented x 4   Pain Assessment   Patient Currently in Pain No   Integumentary/Edema   Integumentary/Edema no deficits were identifed   Posture    Posture Not impaired   Strength (Manual Muscle Testing)   Strength Comments Pt is at baseline for mobility   Bed Mobility   Bed Mobility no deficits identified   Transfers   Transfers no deficits identified   Gait/Stairs (Locomotion)   [place] Level (Gait) independent   Balance   Balance no deficits were identified   Sensory Examination   Sensory Perception WFL   Coordination   Coordination no deficits were identified   Muscle Tone   Muscle Tone no deficits were identified   Clinical Impression   Criteria for Skilled Therapeutic Intervention Yes, treatment indicated;No problems identified which require skilled intervention;Current level of function same as previous level of function   PT Diagnosis (PT) prolonged hospital stay - preventative therapy   Clinical Presentation (PT Evaluation Complexity) Stable/Uncomplicated   Planned Therapy Interventions (PT) strengthening;stretching   PT  Discharge Planning   PT Discharge Recommendation (DC Rec) home   PT Rationale for DC Rec IND = at baseline for mobility   PT Brief overview of current status IND with mobility   Plan of Care Review   Plan of Care Reviewed With patient;mother   Total Evaluation Time   Total Evaluation Time (Minutes) 10   Physical Therapy Goals   PT Frequency 1x/week   PT Predicated Duration/Target Date for Goal Attainment 03/24/22   PT Goals PT Goal 1   PT: Goal 1 IND with HEP / TE for maintaining strength and mobility while in the hospital for monitoring

## 2022-03-10 NOTE — PROVIDER NOTIFICATION
03/10/22 1235   Provider Notification   Provider Name/Title Dr Dempsey   Method of Notification Electronic Page   pt is asking if she can be off the unit - outside the lobby to visit with her children.

## 2022-03-10 NOTE — CONSULTS
[facility]  MATERNAL CHILD HEALTH   INITIAL PSYCHOSOCIAL ASSESSMENT     DATA:     Presenting Information: Pao is a 41year old  admitted for preeclampsia. SW was consulted for antepartum assessment.    Living Situation: Parents are  and live together in [place], [place] in [facility], along with her  Abel and their two children Saira age 10 and Roshan age 7.    Social Support: Pao reports her  Abel and her parents are her biggest sources of support.  The family also get support from extended family and friends.    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  Abel is a  and works FT.    Insurance: [employer] through Abel's employer    Source of Financial Support: Parental employment    Mental Health History: no    History of Postpartum Mood Disorders: no    Chemical Health History: no    Legal/Child Protection Involvement: no    INTERVENTION:       Chart review    Collaboration with team    Conducted Psychosocial Assessment    Introduction to Maternal Child Health SW role and scope of practice    Orientation to the Antepartum (parking, visitation)    Validated emotions and provided supportive listening    Provided psychoeducation on  mood disorders and indicated that SW would continue to monitor mood and support bridging to mental health resources as needed.    Provided resources and referrals    Monthly parking pass    Provided SW contact info    ASSESSMENT:     Coping: This writer met with Pao at bedside in Antepartum unit.  Pao's mom Maria Elena Arango was also present offering support and encouragement.  Pao reports she was admitted on Monday with the expectation she would be monitored and discharged home.   It has been challenging to process an extended stay and possible NICU admission.  Pao is grateful for the care she is receiving and staff willingness to answer her questions.  She reports this is especially  "helpful in decreasing her anxiety.  Pao reports she doesn't typically have much anxiety but worrying about baby and an early delivery has been challenging.   Pao became very tearful when she talked about missing her kids at home.  Last night her 10 year old daughter broke down on the phone and this was very hard for Pao.  Her daughter Saira appears to be trying to fill the \"Mom\" role at home and tends to be an anxious or sensitive child.  aPo reports her parents are from the [place], [place] are and are staying with her  and the kids and helping with meals and daily routines.  This has been immensely helpful.  Abel or Maria Elena Arango try to visit daily but the cost of parking has limited their visits.  This writer provided a Monthly parking pass for the family.      We discussed coping strategies to manage stress and anxiety while in Antepartum.  Pao has consulted with Melvi in Child Life for support with separation from kids.  Pao is open to on-line support groups and other resources as needs arise.  We also discussed resources for passing time and maintaining a daily schedule when possible.  Pao reports she is very concerned for her children's wellbeing if she has a long hospital stay.  She asked if it was possible to obtain a visitor exception to connect with her kids in the lobby or outside.    Assessment of parental risk for PMAD: Higher than average risk, considering medically complexity     Current stressors, barriers, family concerns: separation from school age kids, long hospitalization, insurance questions, baby's wellbeing    Risk Factors: separation from kids may negatively impact mental health    Resiliency Factors & Strengths: experienced parents, strong support system, supportive partner, good advocacy skills, willingness to engage with support resources    PLAN:     SW will continue to follow for supportive intervention.    Ivory Crocker  DSW, MSW, [facility] HospitalSW  Maternal Child Health Social " Worker

## 2022-03-10 NOTE — PROVIDER NOTIFICATION
03/10/22 1324   Provider Notification   [place] [facility] [place]   Method of Notification Electronic Page   asked to see pt - order in place.  Pt has questions re: parking, etc.

## 2022-03-10 NOTE — PROVIDER NOTIFICATION
"   03/10/22 4431   Provider Notification   Provider Name/Title [facility]   Method of Notification At Bedside;In Department   pt reported seeing white spots for 5-10 seconds after monitors placed.  Pt then reported left ear \"wet\" \"full\".  BP in non severe range.  Pulse in 50's.  +1 reflexes. No HA or epigastric pain.  Given juice.  BG 74 - consistent all day.  Repositioned to lateral.  Remained on monitors.   "

## 2022-03-10 NOTE — PROVIDER NOTIFICATION
03/10/22 0840   Provider Notification   Provider Name/Title Katt Champion   Method of Notification Phone   asked regarding scheduled Latuda at 0800 and requirement of 350 calories.  aniti depressant - may cause nausea/upset stomach.  Per pharm, pt while inpt may take with fewer than 350 calories and/or can be rescheduled to later in the day.

## 2022-03-11 LAB
ALBUMIN SERPL-MCNC: 2.5 G/DL (ref 3.4–5)
ALP SERPL-CCNC: 70 U/L (ref 40–150)
ALT SERPL W P-5'-P-CCNC: 37 U/L (ref 0–50)
ANION GAP SERPL CALCULATED.3IONS-SCNC: 4 MMOL/L (ref 3–14)
AST SERPL W P-5'-P-CCNC: 19 U/L (ref 0–45)
BILIRUB SERPL-MCNC: 0.2 MG/DL (ref 0.2–1.3)
BUN SERPL-MCNC: 10 MG/DL (ref 7–30)
CALCIUM SERPL-MCNC: 10.5 MG/DL (ref 8.5–10.1)
CHLORIDE BLD-SCNC: 110 MMOL/L (ref 94–109)
CO2 SERPL-SCNC: 22 MMOL/L (ref 20–32)
CREAT SERPL-MCNC: 0.56 MG/DL (ref 0.52–1.04)
ERYTHROCYTE [DISTWIDTH] IN BLOOD BY AUTOMATED COUNT: 13.6 % (ref 10–15)
GFR SERPL CREATININE-BSD FRML MDRD: >90 ML/MIN/1.73M2
GLUCOSE BLD-MCNC: 75 MG/DL (ref 70–99)
GLUCOSE BLDC GLUCOMTR-MCNC: 71 MG/DL (ref 70–99)
GLUCOSE BLDC GLUCOMTR-MCNC: 79 MG/DL (ref 70–99)
GLUCOSE BLDC GLUCOMTR-MCNC: 81 MG/DL (ref 70–99)
GLUCOSE BLDC GLUCOMTR-MCNC: 82 MG/DL (ref 70–99)
GLUCOSE BLDC GLUCOMTR-MCNC: 87 MG/DL (ref 70–99)
HCT VFR BLD AUTO: 36.2 % (ref 35–47)
HGB BLD-MCNC: 12.2 G/DL (ref 11.7–15.7)
MCH RBC QN AUTO: 31.2 PG (ref 26.5–33)
MCHC RBC AUTO-ENTMCNC: 33.7 G/DL (ref 31.5–36.5)
MCV RBC AUTO: 93 FL (ref 78–100)
PLATELET # BLD AUTO: 220 10E3/UL (ref 150–450)
POTASSIUM BLD-SCNC: 4.1 MMOL/L (ref 3.4–5.3)
PROT SERPL-MCNC: 6.1 G/DL (ref 6.8–8.8)
RBC # BLD AUTO: 3.91 10E6/UL (ref 3.8–5.2)
SODIUM SERPL-SCNC: 136 MMOL/L (ref 133–144)
WBC # BLD AUTO: 8.8 10E3/UL (ref 4–11)

## 2022-03-11 PROCEDURE — 93005 ELECTROCARDIOGRAM TRACING: CPT

## 2022-03-11 PROCEDURE — 85027 COMPLETE CBC AUTOMATED: CPT | Performed by: STUDENT IN AN ORGANIZED HEALTH CARE EDUCATION/TRAINING PROGRAM

## 2022-03-11 PROCEDURE — 36415 COLL VENOUS BLD VENIPUNCTURE: CPT | Performed by: STUDENT IN AN ORGANIZED HEALTH CARE EDUCATION/TRAINING PROGRAM

## 2022-03-11 PROCEDURE — 250N000013 HC RX MED GY IP 250 OP 250 PS 637: Performed by: STUDENT IN AN ORGANIZED HEALTH CARE EDUCATION/TRAINING PROGRAM

## 2022-03-11 PROCEDURE — 99231 SBSQ HOSP IP/OBS SF/LOW 25: CPT | Performed by: OBSTETRICS & GYNECOLOGY

## 2022-03-11 PROCEDURE — 250N000011 HC RX IP 250 OP 636: Performed by: STUDENT IN AN ORGANIZED HEALTH CARE EDUCATION/TRAINING PROGRAM

## 2022-03-11 PROCEDURE — 99233 SBSQ HOSP IP/OBS HIGH 50: CPT | Performed by: NURSE PRACTITIONER

## 2022-03-11 PROCEDURE — 120N000002 HC R&B MED SURG/OB UMMC

## 2022-03-11 PROCEDURE — 80053 COMPREHEN METABOLIC PANEL: CPT | Performed by: STUDENT IN AN ORGANIZED HEALTH CARE EDUCATION/TRAINING PROGRAM

## 2022-03-11 PROCEDURE — 93010 ELECTROCARDIOGRAM REPORT: CPT | Performed by: INTERNAL MEDICINE

## 2022-03-11 RX ADMIN — ASPIRIN 81 MG: 81 TABLET, COATED ORAL at 07:58

## 2022-03-11 RX ADMIN — ENOXAPARIN SODIUM 40 MG: 40 INJECTION SUBCUTANEOUS at 19:44

## 2022-03-11 RX ADMIN — PRENATAL VITAMINS-IRON FUMARATE 27 MG IRON-FOLIC ACID 0.8 MG TABLET 1 TABLET: at 07:58

## 2022-03-11 NOTE — PROGRESS NOTES
Diabetes Consult Daily  Progress Note          Assessment/Plan:     HPI:   Pt is a 41 year old  at 27w4d by 11w0d US, here for preeclampsia without severe features.  Given betamethasone 3/7-3/8, failed outpatient glucose tolerance screen.      IDS to sign off from daily visits following today's rounding      Assessment:     1) possible GDM, failed outpatient 1 hour glucose tolerance screen  2) steroid induced hyperglycemia     Plan:                  -NPH now discontinued ---trend BG to assess needs with steroid affect waning                 -Holding Novolog to 1:20g CHO coverage with meals and snacks/supplements                 -May continue/re-start if needed in future -> recommend Novolog custom sliding scale insulin, 1 per 50>100 AC, >120 HS                 -BG monitoring TID AC, 2 hours PP, HS, 0200                 -hypoglycemia protocol                 -carb counting protocol w/75g CHO diet                 -diabetes educator consult                 -nutrition consult---completed 3/8.    Management of Diabetes in pregnancy:    BG targets  Less than 95 fasting  Less than 140 one hour post-meal  Less than 120 two hours post-meal  Goal of 0 episodes of blood sugars less than 60     A1c goal in pregnancy: 6.5% or less      Inpatient Diabetes Service Signing off 2022 - communicated to Dr. Rangel  Please call back with any future needs/questions.     Outpatient follow up: TBD    Plan discussed with patient, bedside RN/primary team via this note.           Interval History:     The last 24 hours progress and nursing notes reviewed.      BG with no steroid effects and now no apparent need for insulin     BG trend:      Doing well.  Asking a lot of questions about diet and food.  Will be in hospital for some weeks and likely until delivery.    Aware IDS will not continue coming for daily visits but can be reached for any future needs.     sliding scale insulin has now been  discontinued as well. Postprandial BG will continue for data points.    Both Pao and her mother verbalized understanding of plan going forward      Planned Procedures/surgeries: none  D5W-containing solutions/medications: none    Recent Labs   Lab 03/11/22  0218 03/10/22  2300 03/10/22  1856 03/10/22  1428 03/10/22  1134 03/10/22  0913   GLC 79 80 64* 74 77 77         Nutrition:     Orders Placed This Encounter      High Consistent Carb (75 g CHO per Meal) Diet          PTA Regimen:     None          Review of Systems:   CC: Denies    Constitutional:   No fever/chills  HEENT:   Denies vision or hearing changes.  No swallowing difficulties.  CV:   Denies CP  Resp:   Denies SOB, cough or wheezing  GI:   no N/V, no constipation, no  diarrhea.  no abd pain.  :  No dysuria or frequency.    Musk:  Denies myalgia/joint pain, no back pain  Skin/heme:   Denies new rashes/open areas.  No pruritis  Neuro:   No new weakness, denies numbness/tingling, denies headache  Psych:   Denies mood changes or depression/anxiety  Endocrine:  No new polyuria or polydipsia         Medications:   Steroid planning:  s/p betamethasone 3/7 and 3/8  Tube Feeding: nine       Physical Exam:   /70   Pulse 52   Temp 98.4  F (36.9  C) (Oral)   Resp 16   Wt 106.6 kg (235 lb 1.6 oz)   SpO2 99%   BMI 43.70 kg/m      General:   A&O, NAD, pleasant, resting comfortably. Well nourished - mom in room, supportive  HEENT:  NC/AT. MMM, EOMI, Anicteric  Lungs:  unremarkable, no new cough, no SOB  ABD:   rounded, soft  Extremities:  no edema, non-tender.  Feet wnl  Skin:  warm and dry, no obvious lesions/rash/bleeding  Neuro:  No focal neurological deficits  Psych:   Cooperative, appropriate mood, good eye contact, congruent affect          Data:     Lab Results   Component Value Date    A1C 4.6 03/08/2022    A1C 4.9 04/23/2019        CBC RESULTS: Recent Labs   Lab Test 03/11/22  0625   WBC 8.8   RBC 3.91   HGB 12.2   HCT 36.2   MCV 93   MCH 31.2    MCHC 33.7   RDW 13.6          Recent Labs   Lab Test 03/11/22  0218 03/10/22  2300 03/10/22  0711 03/10/22  0619 03/09/22  1142 03/09/22  1110   NA  --   --   --  139  --  137   POTASSIUM  --   --   --  4.2  --  4.2   CHLORIDE  --   --   --  111*  --  107   CO2  --   --   --  23  --  21   ANIONGAP  --   --   --  5  --  9   GLC 79 80   < > 89   < > 113*   BUN  --   --   --  15  --  11   CR  --   --   --  0.59  --  0.57   THELMA  --   --   --  9.5  --  9.2    < > = values in this interval not displayed.     Liver Function Studies -   Recent Labs   Lab Test 03/10/22  0619   PROTTOTAL 5.7*   ALBUMIN 2.4*   BILITOTAL 0.2   ALKPHOS 67   AST 15   ALT 39     Lab Results   Component Value Date    INR 0.97 03/08/2022     KADEEM Hollis CNP   Inpatient Diabetes Management Service  Pager - [phone]  Available on [place]   Friday - Monday 0800 - 1600 hrs    To contact Endocrine Diabetes service:   From 8AM-4PM: page inpatient diabetes provider who is following the patient that day (see filed or incomplete progress notes/consult notes).  If uncertain of provider assignment: page job code [id number]. (To page job code in-house dial 3 stars, 777 then enter number).  For questions or updates AFTER HOURS from 4PM-8AM: page the diabetes job code for on call fellow: [phone]    Please notify inpatient diabetes service if changes are planned to steroids, nutrition, or if procedures are planned requiring prolonged NPO status.Diabetes Management Team job code: [phone]    I spent a total of 35 minutes on the date of the encounter doing chart review, history and exam, documentation and further activities per the note.  Over 50% of my time on the unit was spent counseling the patient and/or coordinating care regarding acute hyperglycemic management.  See note for details.

## 2022-03-11 NOTE — PLAN OF CARE
Goal Outcome Evaluation:       28.0 wks here with pre E SF, SFGR, GDM insulin.  Had extended morning monitor following a VD at 0638.  +FM.   mod with 10x10 after period of minimal.  Pt reported x 1 nonpainful tightness while standing this morning.  Bradycardic.  Had 12 lead ECG - marked sinus variability.  See note re: pt pacing in room and pulse response.  BG WNL and not requiring insulin.  Pt reports no headache, vision changes, epigastric pain.  +1 reflexes.  Pt reported the sensation of ear fullness while up - resolving - declined any interventions.  Dr Dempsey notified.  Seen by Dr Rangel, Dr Dempsey and MS on MFM Rounds this morning.  Call light is within reach.

## 2022-03-11 NOTE — PROVIDER NOTIFICATION
03/11/22 1450   Provider Notification   Provider Name/Title Dr Dempsey   Method of Notification Phone   notified of fetal tachycardia.  Will continue to monitor.  Maternal pulse 46 - no change in condition.  C/o dull left temporal headache.  Declines meds.     "Patient: Tasneem Peck    Procedure Summary     Date: 11/25/20 [facility] / [facility]    Anesthesia Start: 0757 Anesthesia Stop: 0821    Procedure: COLONOSCOPY to CECUM (N/A ) Diagnosis:       Family history of colon cancer      Bowel habit changes      (Family history of colon cancer [Z80.0])      (Bowel habit changes [R19.4])    Surgeon: Justus Ann MD Provider: Caro Stanley MD    Anesthesia Type: MAC ASA Status: 2          Anesthesia Type: MAC    Vitals  Vitals Value Taken Time   /86 11/25/20 0845   Temp     Pulse 83 11/25/20 0845   Resp 16 11/25/20 0845   SpO2 97 % 11/25/20 0845           Post Anesthesia Care and Evaluation    Patient location during evaluation: PACU  Patient participation: complete - patient participated  Level of consciousness: awake  Pain management: adequate  Airway patency: patent  Anesthetic complications: No anesthetic complications  PONV Status: none  Cardiovascular status: acceptable  Respiratory status: acceptable  Hydration status: acceptable    Comments: /86   Pulse 83   Temp 36.7 °C (98 °F) (Oral)   Resp 16   Ht 188 cm (74\")   Wt 123 kg (270 lb 14.4 oz)   SpO2 97%   BMI 34.78 kg/m²         "

## 2022-03-11 NOTE — PLAN OF CARE
No vision changes or severe range BPs this evening. Extensive education provided on preeclampsia and questions answered. Blood glucose low-normal range and all insulin held. Ate veggie burger, green beans, and salad for dinner. Happy, curious and cooperative.    No contractions or signs of PTL. Fetal baseline 155, minimal variability and no decelerations with monitoring which is still in progress. Lots of fetal movement and difficulty with tracing.    Vitals:    03/10/22 1500 03/10/22 1842 03/10/22 1845 03/10/22 2128   BP:   (!) 147/74 133/73   BP Location:       Patient Position:       Cuff Size:       Pulse:       Resp:    16   Temp:  98  F (36.7  C)  98  F (36.7  C)   TempSrc:  Oral  Oral   SpO2: 99%      Weight:         Recent Results (from the past 12 hour(s))   Glucose by meter    Collection Time: 03/10/22 11:34 AM   Result Value Ref Range    GLUCOSE BY METER POCT 77 70 - 99 mg/dL   Glucose by meter    Collection Time: 03/10/22  2:28 PM   Result Value Ref Range    GLUCOSE BY METER POCT 74 70 - 99 mg/dL   Glucose by meter    Collection Time: 03/10/22  6:56 PM   Result Value Ref Range    GLUCOSE BY METER POCT 64 (L) 70 - 99 mg/dL   Glucose by meter    Collection Time: 03/10/22 11:00 PM   Result Value Ref Range    GLUCOSE BY METER POCT 80 70 - 99 mg/dL      Continue current plan of care.    Noelle Whittaker RN

## 2022-03-11 NOTE — PROGRESS NOTES
[facility]  Progress Note    S:   She is doing well this morning. Had one non-sustained mild range BP yesterday. Informed her that she has persistent bradycardia- she is not lightheaded, not dizzy, no chest pain. Informed her that her EKG was WNL for completeness.  Otherwise, feeling well. Denies any vision changes, headache.  She denies contractions, vaginal bleeding, chest pain, SOB, RUQ pain, blurry vision.     O:  Vitals:    03/10/22 2128 03/10/22 2326 22 0220 22 0657   BP: 133/73 132/73 129/70 133/73   BP Location:       Patient Position:       Cuff Size:       Pulse:    (!) 44   Resp: 16 16 16 16   Temp: 98  F (36.7  C) 98.4  F (36.9  C) 98.4  F (36.9  C)    TempSrc: Oral Oral Oral    SpO2:       Weight:         Gen: Appears well, nad  CV:  RRR, no murmurs  Pulm:  CTAB, no wheezes or crackles  Abd:  Gravid, soft, non-tender  Ext:  trace-1+ LE edema b/l    FHT: Baseline 155, moderate variability, present accelerations, one spontaneous deceleration this AM, not recurrent  [place]: 0 contractions in 10 minutes    I/O:  -800 cc today    Labs:  BGL 71 @0724  CMP WNL    A/P:  41 year old  at 27w6d by 11w0d US, here for preeclampsia with severe features. Pregnancy is notable for severe FGR and asthma.      # Pre-eclampsia with Severe Features (BP)  - Serial BP monitoring   - IV Antihypertensives prn for sustained severe range blood pressures (>160/>110), s/p IV labetalol 20 mg x1 (3/7 2115)  - due to bradycardia, avoid prn labetolol  - No PTA antihypertensives  - Labs: ALT 86>75>37, otherwise wnl, UPC <0.05    - Repeat HELLP labs daily  - s/p Mag infusion through BMZ window    - off at 2030 3/9               - Strict I/O, daily weights  - admission until 34wks d/t pre-E with SF, pending earlier declaration     # FGR  - Growth ; EFW 631g, <1%, AC <1%  - UAD 3/10 wnl  - Betamethasone x2 (3/7-3/8)    #Failed 1-hr Glucose Tolerance Test  - S/p Endocrine, nutrition, diabetic  educator consult  - continue Novolog custom sliding scale insulin, 1 per 50>100 AC, >120   - Recommendations: hold 8U NPH, hold 1:20g CHO  - no history of GDM, DM outside of pregnancy  - high carb consistent diet     #Persistent Bradycardia  -HR in hospital 40-50's  -asymptomatic  -documented HR of 40's back in 2019  -EKG to rule out heart block, WNL other than bradycardic    # PNC  - Rh pos, Rubella immune, GCT failed, needs GTT, GBS unknown  - Other prenatal labs wnl  - Imaging: see above and imaging tab                 # FWB:   Cat I tracing, reactive and appropriate for gestational age; transverse by MFM US 3/10  - s/p BMZ for fetal lung maturity   - NICU consult complete  - Intrauterine resuscitative measures francesco Frank   7:53 AM  2022    Physician Attestation   I, Laura Rangel, was present with the medical/NOREEN student who participated in the service and in the documentation of the note.  I have verified the history and personally performed the physical exam and medical decision making.  I agree with the assessment and plan of care as documented in the note.      I personally reviewed vital signs, medications, labs, imaging and EFM.    In summary, Pao Pereira is a  at 27w5d admitted with clinical picture consistent with preeclampsia with severe features in context of know severe fetal growth restriction.  Currently receiving magnesium and BMZ course.  Will plan inpatient management through delivery, which is recommended by 34w0d, or as clinically indicated by maternal and fetal status.  Patient currently meets criteria for expectant management and will continue close inpatient management.     Reviewed EKG with Cardiology due to sinus bradycardia, who recommended assessment of maternal HR with activity.  If persistent HR < 50-60 despite activity, then will plan to consult Cardiology and consider telemetry.      Laura Rangel MD  Date of Service (when I saw the patient):  03/11/22    Time Spent on this Encounter   I spent 15 minutes on the unit/floor managing the care of Pao Pereira. Over 50% of my time was spent on the following:   - Counseling the patient and/or family regarding: diagnostic results and prognosis  - Coordination of care with the: nurse and patient    Laura Rangel MD

## 2022-03-11 NOTE — PROVIDER NOTIFICATION
03/10/22 2300   Fetal Assessment   Fetal Movement active   Fetal HR Assessment Method external US   Fetal HR (beats/min) 155   Fetal HR Baseline normal range   Fetal HR Variability minimal (detectable, amplitude less than or equal to 5 bpm)   Fetal HR Accelerations absent   Fetal HR Decelerations absent       Frequent lost tracing d/t fetal movement. RN at bedside to reposition monitor > 25 min.

## 2022-03-11 NOTE — PROVIDER NOTIFICATION
03/11/22 1210   Provider Notification   Provider Name/Title Dr Dempsey   Method of Notification In Department   per pt, endo saw - no longer requires insulin.  Will no longer check pre meal BGs at this time.

## 2022-03-11 NOTE — PROGRESS NOTES
"   03/11/22 1548   Child Life   Location (Antepartum)   Intervention Follow Up;Sibling Support;Family Support   Family Support Comment Supportive check-in, conversation and listening was provided at the bedside to patient. Patient shared updates re:coping of son and daughter. CCLS encouraged continued information sharing in age-appropriate ways; tour of room, pictures of equipment, conversations about reason for hospitalization, brief updates about the \"things doctors and nursing are doing to keep the baby safe and healthy\", etc. Patient asked appropriate questions about supporting children's understanding and continued positive coping.    Additionally, CCLS engaged patient in conversation about her ongoing coping; highlighting wellness resources available. [facility] newsletter and information was provided.     Sibling Support Comment CCLS provided ' pocket stars' for family members to keep in pockets of their clothing to hold/fidget with while at school and away from each other; Blank journals were provided for patient/kids to create a traveling journal that can go to/from home/hospital; supporting connection, expression, and processing of hospitalization- Matching pillow cases were also given to foster connection while .   Outcomes/Follow Up Continue to Follow/Support     "

## 2022-03-11 NOTE — PLAN OF CARE
Pt able to rest well overnight. VSS besides HR of 44 @ 0657-provider notified, afebrile. BP WNL. Pt denies HA, blurred vision, SOB and RUQ pain overnight. 0200 BG 79. 0630- 71. No contractions or s/s of PTL. Fetal baseline 155, moderate variability, acceleration present. See previous RN and flowsheet for decelerations. Will continue monitoring this morning. Continue with plan of care.

## 2022-03-11 NOTE — PROVIDER NOTIFICATION
03/11/22 0701   Provider Notification   Provider Name/Title Dr. Ames   Method of Notification Electronic Page   Request Evaluate - Remote   Notification Reason Maternal Vital Sign Change   FYI pt HR 44 this morning.

## 2022-03-11 NOTE — PROVIDER NOTIFICATION
03/11/22 0640   Provider Notification   Provider Name/Title Dr. Ames   Method of Notification Phone   Request Evaluate - Remote   Notification Reason Decels   Deceleration @ 0628 followed by a second 2 minute deceleration. Repositioned pt. FHR has returned to baseline. Per provider continue EFM until rounds this morning.

## 2022-03-12 LAB
ALBUMIN SERPL-MCNC: 2.5 G/DL (ref 3.4–5)
ALP SERPL-CCNC: 69 U/L (ref 40–150)
ALT SERPL W P-5'-P-CCNC: 37 U/L (ref 0–50)
ANION GAP SERPL CALCULATED.3IONS-SCNC: 6 MMOL/L (ref 3–14)
AST SERPL W P-5'-P-CCNC: 20 U/L (ref 0–45)
BILIRUB SERPL-MCNC: 0.2 MG/DL (ref 0.2–1.3)
BUN SERPL-MCNC: 13 MG/DL (ref 7–30)
CALCIUM SERPL-MCNC: 10.3 MG/DL (ref 8.5–10.1)
CHLORIDE BLD-SCNC: 110 MMOL/L (ref 94–109)
CO2 SERPL-SCNC: 22 MMOL/L (ref 20–32)
CREAT SERPL-MCNC: 0.59 MG/DL (ref 0.52–1.04)
ERYTHROCYTE [DISTWIDTH] IN BLOOD BY AUTOMATED COUNT: 13.5 % (ref 10–15)
GFR SERPL CREATININE-BSD FRML MDRD: >90 ML/MIN/1.73M2
GLUCOSE BLD-MCNC: 81 MG/DL (ref 70–99)
GLUCOSE BLDC GLUCOMTR-MCNC: 81 MG/DL (ref 70–99)
GLUCOSE BLDC GLUCOMTR-MCNC: 90 MG/DL (ref 70–99)
GLUCOSE BLDC GLUCOMTR-MCNC: 91 MG/DL (ref 70–99)
GLUCOSE BLDC GLUCOMTR-MCNC: 93 MG/DL (ref 70–99)
HCT VFR BLD AUTO: 36 % (ref 35–47)
HGB BLD-MCNC: 12.4 G/DL (ref 11.7–15.7)
MCH RBC QN AUTO: 31.2 PG (ref 26.5–33)
MCHC RBC AUTO-ENTMCNC: 34.4 G/DL (ref 31.5–36.5)
MCV RBC AUTO: 91 FL (ref 78–100)
PLATELET # BLD AUTO: 211 10E3/UL (ref 150–450)
POTASSIUM BLD-SCNC: 4.1 MMOL/L (ref 3.4–5.3)
PROT SERPL-MCNC: 6.1 G/DL (ref 6.8–8.8)
RBC # BLD AUTO: 3.98 10E6/UL (ref 3.8–5.2)
SODIUM SERPL-SCNC: 138 MMOL/L (ref 133–144)
WBC # BLD AUTO: 7.9 10E3/UL (ref 4–11)

## 2022-03-12 PROCEDURE — 99231 SBSQ HOSP IP/OBS SF/LOW 25: CPT | Performed by: OBSTETRICS & GYNECOLOGY

## 2022-03-12 PROCEDURE — 250N000011 HC RX IP 250 OP 636: Performed by: STUDENT IN AN ORGANIZED HEALTH CARE EDUCATION/TRAINING PROGRAM

## 2022-03-12 PROCEDURE — 85014 HEMATOCRIT: CPT | Performed by: STUDENT IN AN ORGANIZED HEALTH CARE EDUCATION/TRAINING PROGRAM

## 2022-03-12 PROCEDURE — 36415 COLL VENOUS BLD VENIPUNCTURE: CPT | Performed by: STUDENT IN AN ORGANIZED HEALTH CARE EDUCATION/TRAINING PROGRAM

## 2022-03-12 PROCEDURE — 250N000013 HC RX MED GY IP 250 OP 250 PS 637: Performed by: STUDENT IN AN ORGANIZED HEALTH CARE EDUCATION/TRAINING PROGRAM

## 2022-03-12 PROCEDURE — 120N000002 HC R&B MED SURG/OB UMMC

## 2022-03-12 PROCEDURE — 80053 COMPREHEN METABOLIC PANEL: CPT | Performed by: STUDENT IN AN ORGANIZED HEALTH CARE EDUCATION/TRAINING PROGRAM

## 2022-03-12 RX ADMIN — ENOXAPARIN SODIUM 40 MG: 40 INJECTION SUBCUTANEOUS at 19:35

## 2022-03-12 RX ADMIN — PRENATAL VITAMINS-IRON FUMARATE 27 MG IRON-FOLIC ACID 0.8 MG TABLET 1 TABLET: at 07:50

## 2022-03-12 RX ADMIN — ASPIRIN 81 MG: 81 TABLET, COATED ORAL at 07:50

## 2022-03-12 NOTE — PROGRESS NOTES
[facility]  Progress Note    S:   Pao has no complaints or concerns this morning. Did not have any severe range BP overnight. Denies HA/VC/EP. No contractions, bleeding or LOF. No CP or SOB.     Had normal pre-eclampsia serum labs the last few days and all normal glucose testing.    O:  Vitals:    03/12/22 0108 03/12/22 0511 03/12/22 0756 03/12/22 0900   BP: 120/76 123/73  134/66   BP Location:    Left arm   Patient Position:    Semi-Gibbs's   Cuff Size:    Adult Large   Pulse:    57   Resp:  16  16   Temp:  98.8  F (37.1  C)     TempSrc:  Oral  Oral   SpO2:       Weight:   106.1 kg (233 lb 14.4 oz)      Gen: Appears well, nad  CV:  RRR, no murmurs  Abd:  Gravid, soft, non-tender    FHT: Baseline 150, moderate variability, no decelerations, occasional acceleration  [place]: 0 contractions noted      Labs:  Results for orders placed or performed during the hospital encounter of 03/07/22 (from the past 24 hour(s))   Glucose by meter   Result Value Ref Range    GLUCOSE BY METER POCT 82 70 - 99 mg/dL   Glucose by meter   Result Value Ref Range    GLUCOSE BY METER POCT 81 70 - 99 mg/dL   Glucose by meter   Result Value Ref Range    GLUCOSE BY METER POCT 87 70 - 99 mg/dL   Glucose by meter   Result Value Ref Range    GLUCOSE BY METER POCT 81 70 - 99 mg/dL   CBC with platelets   Result Value Ref Range    WBC Count 7.9 4.0 - 11.0 10e3/uL    RBC Count 3.98 3.80 - 5.20 10e6/uL    Hemoglobin 12.4 11.7 - 15.7 g/dL    Hematocrit 36.0 35.0 - 47.0 %    MCV 91 78 - 100 fL    MCH 31.2 26.5 - 33.0 pg    MCHC 34.4 31.5 - 36.5 g/dL    RDW 13.5 10.0 - 15.0 %    Platelet Count 211 150 - 450 10e3/uL   Comprehensive metabolic panel   Result Value Ref Range    Sodium 138 133 - 144 mmol/L    Potassium 4.1 3.4 - 5.3 mmol/L    Chloride 110 (H) 94 - 109 mmol/L    Carbon Dioxide (CO2) 22 20 - 32 mmol/L    Anion Gap 6 3 - 14 mmol/L    Urea Nitrogen 13 7 - 30 mg/dL    Creatinine 0.59 0.52 - 1.04 mg/dL    Calcium 10.3  (H) 8.5 - 10.1 mg/dL    Glucose 81 70 - 99 mg/dL    Alkaline Phosphatase 69 40 - 150 U/L    AST 20 0 - 45 U/L    ALT 37 0 - 50 U/L    Protein Total 6.1 (L) 6.8 - 8.8 g/dL    Albumin 2.5 (L) 3.4 - 5.0 g/dL    Bilirubin Total 0.2 0.2 - 1.3 mg/dL    GFR Estimate >90 >60 mL/min/1.73m2   Glucose by meter   Result Value Ref Range    GLUCOSE BY METER POCT 93 70 - 99 mg/dL       A/P:  41 year old  at 28w1dd by 11w0d US, here for preeclampsia with severe features by headache and severe BP, initially with mild lab changes now resolved x 3 days. Pregnancy is notable for severe FGR and asthma.      # Pre-eclampsia with Severe Features (BP)  - Serial BP monitoring   - IV Antihypertensives prn for sustained severe range blood pressures (>160/>110), s/p IV labetalol 20 mg x1 (3/7 2115)  - due to bradycardia, avoid prn labetolol  - No PTA antihypertensives  - Labs:all labs have normalized x 3 assessments.  Space out HELLP lab evaluation to every 48 hours at this time, consider spacing to every 72 hours on Monday if labs still all normal  - s/p Mag infusion through BMZ window    - off at 2030 3/9               - Strict I/O, daily weights  - admission until anticipated delivery at 34wks d/t pre-E with SF, pending earlier declaration. Magnesium for seizure prophylaxis at delivery.     # FGR  - Growth ; EFW 631g, <1%, AC <1%  - UAD 3/10 wnl - repeat US Monday  - Betamethasone x2 (3/7-3/8)    #Failed 1-hr Glucose Tolerance Test  - S/p Endocrine, nutrition, diabetic educator consult  - continue Novolog custom sliding scale insulin, 1 per 50>100 AC, >120   - Recommendations: hold 8U NPH, hold 1:20g CHO  - no history of GDM, DM outside of pregnancy  - high carb consistent diet  - all normal glucose values over last 24 hours     #Persistent Bradycardia  -HR in hospital 40-50's  -asymptomatic  -documented HR of 40's back in 2019  -EKG to rule out heart block, WNL other than bradycardic  - cardiology recommends reconsult if  symptoms or other concerns identified    # PNC  - Rh pos, Rubella immune, GCT failed, needs GTT, GBS unknown  - Other prenatal labs wnl  - Imaging: see above and imaging tab                 # FWB:   Cat I tracing, reactive and appropriate for gestational age; transverse by MFM US 3/10  - s/p BMZ for fetal lung maturity   - NICU consult complete  - Intrauterine resuscitative measures prn    Jose Manuel Ibarra MD   22  I spent a total of 15 minutes face to face with Pao at the bedside in rounds this am.     Over 50% of this time was spent counseling the patient and/or coordinating care regarding  preE with severe features, fetal growth restriction and GDM.     Jose Manuel Ibarra MD  Maternal-Fetal Medicine  pgr [phone]

## 2022-03-12 NOTE — PLAN OF CARE
Patient slept through the night. Vital signs were stable except for a lower heart rate of 45. Provider notified. FHR tracing within normal limits see flow sheet for details. Patient denied any pain, headaches, or visual changes overnight. Blood sugar 81. Will continue with plan of care.

## 2022-03-12 NOTE — PLAN OF CARE
Goal Outcome Evaluation:      Pt is  28.1 wks.  Pt is here w Pre E w SF and SFGR.  GDM - diet controlled.  Seen by Dr Ibarra during MFM Rounds this morning.  No reported s/s of PreE.  No s/s of PTL.  +FM.  Intact. No vag bleeding.  VSS.  Bradycardia - no changes.  BGs WNL.  Pt out in wheelchair ride with  to visit with children.  Call light is within reach.

## 2022-03-12 NOTE — PLAN OF CARE
Data: Blood pressures within parameters. Signs and symptoms of pre-eclampsia absent. Fetal assessment Appropriate for Gestational Age.  Interventions: Monitor blood pressures and indicators of pre-eclampsia every 4 hours. Continue uterine/fetal assessment tid. Activity level Regular activity. Preventive measures include Positioning and Frequent voiding. Encourage active range of motion and frequent position changes.  Plan: Continue expectant management. Observe for and notify care provider of indicators of worsening pre-eclampsia or signs of fetal/maternal compromise. Goal Outcome Evaluation:          Overall Patient Progress: no change

## 2022-03-13 LAB
ABO/RH(D): NORMAL
ALBUMIN SERPL-MCNC: 2.5 G/DL (ref 3.4–5)
ALP SERPL-CCNC: 73 U/L (ref 40–150)
ALT SERPL W P-5'-P-CCNC: 29 U/L (ref 0–50)
ANION GAP SERPL CALCULATED.3IONS-SCNC: 4 MMOL/L (ref 3–14)
ANTIBODY SCREEN: NEGATIVE
AST SERPL W P-5'-P-CCNC: 14 U/L (ref 0–45)
BILIRUB SERPL-MCNC: 0.2 MG/DL (ref 0.2–1.3)
BUN SERPL-MCNC: 13 MG/DL (ref 7–30)
CALCIUM SERPL-MCNC: 10.7 MG/DL (ref 8.5–10.1)
CHLORIDE BLD-SCNC: 109 MMOL/L (ref 94–109)
CO2 SERPL-SCNC: 23 MMOL/L (ref 20–32)
CREAT SERPL-MCNC: 0.61 MG/DL (ref 0.52–1.04)
ERYTHROCYTE [DISTWIDTH] IN BLOOD BY AUTOMATED COUNT: 13.5 % (ref 10–15)
GFR SERPL CREATININE-BSD FRML MDRD: >90 ML/MIN/1.73M2
GLUCOSE BLD-MCNC: 85 MG/DL (ref 70–99)
GLUCOSE BLDC GLUCOMTR-MCNC: 105 MG/DL (ref 70–99)
GLUCOSE BLDC GLUCOMTR-MCNC: 78 MG/DL (ref 70–99)
GLUCOSE BLDC GLUCOMTR-MCNC: 89 MG/DL (ref 70–99)
GLUCOSE BLDC GLUCOMTR-MCNC: 91 MG/DL (ref 70–99)
HCT VFR BLD AUTO: 36.3 % (ref 35–47)
HGB BLD-MCNC: 12.7 G/DL (ref 11.7–15.7)
MCH RBC QN AUTO: 31.8 PG (ref 26.5–33)
MCHC RBC AUTO-ENTMCNC: 35 G/DL (ref 31.5–36.5)
MCV RBC AUTO: 91 FL (ref 78–100)
PLATELET # BLD AUTO: 221 10E3/UL (ref 150–450)
POTASSIUM BLD-SCNC: 4.4 MMOL/L (ref 3.4–5.3)
PROT SERPL-MCNC: 6.2 G/DL (ref 6.8–8.8)
RBC # BLD AUTO: 4 10E6/UL (ref 3.8–5.2)
SODIUM SERPL-SCNC: 136 MMOL/L (ref 133–144)
SPECIMEN EXPIRATION DATE: NORMAL
WBC # BLD AUTO: 8.4 10E3/UL (ref 4–11)

## 2022-03-13 PROCEDURE — 80053 COMPREHEN METABOLIC PANEL: CPT | Performed by: STUDENT IN AN ORGANIZED HEALTH CARE EDUCATION/TRAINING PROGRAM

## 2022-03-13 PROCEDURE — 99231 SBSQ HOSP IP/OBS SF/LOW 25: CPT | Performed by: OBSTETRICS & GYNECOLOGY

## 2022-03-13 PROCEDURE — 36415 COLL VENOUS BLD VENIPUNCTURE: CPT | Performed by: STUDENT IN AN ORGANIZED HEALTH CARE EDUCATION/TRAINING PROGRAM

## 2022-03-13 PROCEDURE — 120N000002 HC R&B MED SURG/OB UMMC

## 2022-03-13 PROCEDURE — 250N000011 HC RX IP 250 OP 636: Performed by: STUDENT IN AN ORGANIZED HEALTH CARE EDUCATION/TRAINING PROGRAM

## 2022-03-13 PROCEDURE — 86850 RBC ANTIBODY SCREEN: CPT | Performed by: STUDENT IN AN ORGANIZED HEALTH CARE EDUCATION/TRAINING PROGRAM

## 2022-03-13 PROCEDURE — 85027 COMPLETE CBC AUTOMATED: CPT | Performed by: STUDENT IN AN ORGANIZED HEALTH CARE EDUCATION/TRAINING PROGRAM

## 2022-03-13 PROCEDURE — 250N000013 HC RX MED GY IP 250 OP 250 PS 637: Performed by: STUDENT IN AN ORGANIZED HEALTH CARE EDUCATION/TRAINING PROGRAM

## 2022-03-13 RX ADMIN — ASPIRIN 81 MG: 81 TABLET, COATED ORAL at 08:06

## 2022-03-13 RX ADMIN — ENOXAPARIN SODIUM 40 MG: 40 INJECTION SUBCUTANEOUS at 19:07

## 2022-03-13 RX ADMIN — PRENATAL VITAMINS-IRON FUMARATE 27 MG IRON-FOLIC ACID 0.8 MG TABLET 1 TABLET: at 08:06

## 2022-03-13 NOTE — PROGRESS NOTES
[facility]  Progress Note    S: Doing well this morning, sitting up in chair near window. No headache/vision change/epigastric pain. No contractions or shortness of breath. Feeling well.     Lab did come to draw labs today, all normal again, so will space out and not order again until Tuesday.   Type and screen added on to already drawn labs today since > 72 hours.     O:  Vitals:    03/12/22 1940 03/12/22 2235 03/13/22 0159 03/13/22 0808   BP: (!) 144/83 134/73 116/56    BP Location: Left arm Left arm Left arm    Patient Position: Semi-Gibbs's Semi-Gibbs's Semi-Gibbs's    Cuff Size: Adult Large  Adult Large    Pulse:       Resp: 16 16 16    Temp: 98.4  F (36.9  C) 98.4  F (36.9  C) 98.3  F (36.8  C)    TempSrc: Oral Oral Oral    SpO2:       Weight:    105.6 kg (232 lb 14.4 oz)     Gen: Appears well, nad  CV:  RRR, no murmurs  Abd:  Gravid, soft, non-tender  Extr:     Symmetric, no edema    FHT: Monitored x 120 minutes as initially difficult to assess continuously. Baseline 150, moderate variability, no decelerations, accelerations  [place]: 0 contractions noted    Results for orders placed or performed during the hospital encounter of 03/07/22 (from the past 24 hour(s))   Glucose by meter   Result Value Ref Range    GLUCOSE BY METER POCT 93 70 - 99 mg/dL   Glucose by meter   Result Value Ref Range    GLUCOSE BY METER POCT 91 70 - 99 mg/dL   Glucose by meter   Result Value Ref Range    GLUCOSE BY METER POCT 90 70 - 99 mg/dL   CBC with platelets   Result Value Ref Range    WBC Count 8.4 4.0 - 11.0 10e3/uL    RBC Count 4.00 3.80 - 5.20 10e6/uL    Hemoglobin 12.7 11.7 - 15.7 g/dL    Hematocrit 36.3 35.0 - 47.0 %    MCV 91 78 - 100 fL    MCH 31.8 26.5 - 33.0 pg    MCHC 35.0 31.5 - 36.5 g/dL    RDW 13.5 10.0 - 15.0 %    Platelet Count 221 150 - 450 10e3/uL   Comprehensive metabolic panel   Result Value Ref Range    Sodium 136 133 - 144 mmol/L    Potassium 4.4 3.4 - 5.3 mmol/L    Chloride 109  94 - 109 mmol/L    Carbon Dioxide (CO2) 23 20 - 32 mmol/L    Anion Gap 4 3 - 14 mmol/L    Urea Nitrogen 13 7 - 30 mg/dL    Creatinine 0.61 0.52 - 1.04 mg/dL    Calcium 10.7 (H) 8.5 - 10.1 mg/dL    Glucose 85 70 - 99 mg/dL    Alkaline Phosphatase 73 40 - 150 U/L    AST 14 0 - 45 U/L    ALT 29 0 - 50 U/L    Protein Total 6.2 (L) 6.8 - 8.8 g/dL    Albumin 2.5 (L) 3.4 - 5.0 g/dL    Bilirubin Total 0.2 0.2 - 1.3 mg/dL    GFR Estimate >90 >60 mL/min/1.73m2   ABO/Rh type and screen    Narrative    The following orders were created for panel order ABO/Rh type and screen.  Procedure                               Abnormality         Status                     ---------                               -----------         ------                     Adult Type and Screen[[id number]]                            In process                   Please view results for these tests on the individual orders.   Glucose by meter   Result Value Ref Range    GLUCOSE BY METER POCT 78 70 - 99 mg/dL       A/P:  41 year old  at 28w2dd by 11w0d US, here for preeclampsia with severe features by headache and severe BP, initially with mild lab changes now resolved x 4 days. Pregnancy is notable for severe FGR and asthma.      # Pre-eclampsia  with Severe Features (BP) - stable  - Serial BP monitoring   - IV Antihypertensives prn for sustained severe range blood pressures (>160/>110), s/p IV labetalol 20 mg x1 (3/7 2115)  - due to bradycardia, avoid prn labetolol  - No PTA antihypertensives  - Labs:all labs have normalized.  Space out HELLP lab evaluation to every 48 hours at this time, consider spacing to every 72 hours on Tuesday if labs still all normal  - s/p Mag infusion through BMZ window, discontinued 3/9  - admission until anticipated delivery at 34wks d/t pre-E with SF, pending earlier declaration. Magnesium for seizure prophylaxis at delivery.     # FGR - reassuring status at this time  - Growth ; EFW 631g, <1%, AC <1%  - UAD  3/10 wnl - repeat US Monday 3/14  - Betamethasone x 1 course (3/7-3/8)    #Failed 1-hr Glucose Tolerance Test - normal glucose values   - S/p Endocrine, nutrition, diabetic educator consult  - continue Novolog custom sliding scale insulin if indicated - all noermal glucose values over last 48 hours, scale - 1 per 50>100 AC, >120   - high carb consistent diet     #Persistent maternal Bradycardia  -HR in hospital 40-50's  -asymptomatic  -documented HR of 40's back in 2019  -EKG WNL other than bradycardic  -  Seen by cardiology, normal increase in HR with light activity, no further workup at this time - recommends reconsult if symptoms or other concerns identified    # PNC  - Rh pos, Rubella immune  - GBS negative  - Other prenatal labs wnl  - Imaging: see imaging tab                 # FWB:   -fetal heart rate today reactive and appropriate for gestational age; transverse by MFM US 3/10  - s/p BMZ for fetal lung maturity   - NICU consult complete  - Intrauterine resuscitative measures prn    Jose Manuel Ibarra MD   22    I spent a total of 20 minutes face to face with Pao at the bedside in rounds this am.     Over 50% of this time was spent counseling the patient and/or coordinating care regarding  preE with severe features, fetal growth restriction and GDM, and frequency of lab assessment.     Jose Manuel Ibarra MD  Maternal-Fetal Medicine  pgr [phone]

## 2022-03-13 NOTE — PROVIDER NOTIFICATION
03/13/22 0800   Provider Notification   Provider Name/Title dr rhodes   Method of Notification In Department   reviewed tracing - pt may come off monitors at this time.

## 2022-03-13 NOTE — PLAN OF CARE
Elevated blood pressure, but none severe range. Denies headache, vision changes, RUQ pain. BG within goal. FHR appropriate for gestational age -  see flowsheets for details. No ctx on toco. Will continue with current plan of care.

## 2022-03-13 NOTE — PLAN OF CARE
VSS this shift. Patient denies HA, vision changes, abdominal pain, nausea, swelling. Denies signs/symptoms of PTL. Difficult tracing with monitoring this morning, will stay on until day shift.

## 2022-03-13 NOTE — PLAN OF CARE
Goal Outcome Evaluation:      G4PP2 28.2 wks here w Pre E w SF, maternal bradycardia; GDM- diet; SGR.  Comfortable and stable this morning.  Morning monitor reviewed by MD.   mod no decels. AGA.  No ctxs.  Intact. No vag bleeding. +FM.  No s/s of PreE.  Trace edema.  BG checks 2 hrs post meals.  Seen by Dr Ibarra for MFM Rounds.  Pt may go outside in wheelchair today to visit with family.  Pt states understanding to put on call light prior to leaving unit for assessment.  Pt states understanding to put on call light if any changes in her condition.  Call light is within reach.

## 2022-03-14 ENCOUNTER — APPOINTMENT (OUTPATIENT)
Dept: ULTRASOUND IMAGING | Facility: CLINIC | Age: 42
End: 2022-03-14
Attending: STUDENT IN AN ORGANIZED HEALTH CARE EDUCATION/TRAINING PROGRAM
Payer: COMMERCIAL

## 2022-03-14 LAB
GLUCOSE BLDC GLUCOMTR-MCNC: 85 MG/DL (ref 70–99)
GLUCOSE BLDC GLUCOMTR-MCNC: 88 MG/DL (ref 70–99)
GLUCOSE BLDC GLUCOMTR-MCNC: 92 MG/DL (ref 70–99)
GLUCOSE BLDC GLUCOMTR-MCNC: 95 MG/DL (ref 70–99)
SARS-COV-2 RNA RESP QL NAA+PROBE: NEGATIVE

## 2022-03-14 PROCEDURE — 76818 FETAL BIOPHYS PROFILE W/NST: CPT | Mod: 26 | Performed by: OBSTETRICS & GYNECOLOGY

## 2022-03-14 PROCEDURE — 99231 SBSQ HOSP IP/OBS SF/LOW 25: CPT | Mod: 25 | Performed by: OBSTETRICS & GYNECOLOGY

## 2022-03-14 PROCEDURE — 250N000013 HC RX MED GY IP 250 OP 250 PS 637: Performed by: STUDENT IN AN ORGANIZED HEALTH CARE EDUCATION/TRAINING PROGRAM

## 2022-03-14 PROCEDURE — 250N000011 HC RX IP 250 OP 636: Performed by: STUDENT IN AN ORGANIZED HEALTH CARE EDUCATION/TRAINING PROGRAM

## 2022-03-14 PROCEDURE — U0005 INFEC AGEN DETEC AMPLI PROBE: HCPCS | Performed by: OBSTETRICS & GYNECOLOGY

## 2022-03-14 PROCEDURE — 120N000002 HC R&B MED SURG/OB UMMC

## 2022-03-14 PROCEDURE — 76820 UMBILICAL ARTERY ECHO: CPT | Mod: 26 | Performed by: OBSTETRICS & GYNECOLOGY

## 2022-03-14 PROCEDURE — 76819 FETAL BIOPHYS PROFIL W/O NST: CPT

## 2022-03-14 RX ADMIN — PRENATAL VITAMINS-IRON FUMARATE 27 MG IRON-FOLIC ACID 0.8 MG TABLET 1 TABLET: at 07:56

## 2022-03-14 RX ADMIN — ENOXAPARIN SODIUM 40 MG: 40 INJECTION SUBCUTANEOUS at 19:39

## 2022-03-14 RX ADMIN — ASPIRIN 81 MG: 81 TABLET, COATED ORAL at 07:56

## 2022-03-14 RX ADMIN — ACETAMINOPHEN 650 MG: 325 TABLET, FILM COATED ORAL at 18:06

## 2022-03-14 NOTE — PLAN OF CARE
Data: Maternal status is stable, vss, afebrile and normotensive. Patient denies contractions and abdomen is soft and non-tender when palpated. Fetal assessment is appropriate for gestational age.   Action: Continue with plan of care, which is close monitoring of I/O, BP & temperature. Patient encouraged to move extremities while in bed.  Response: Patient coping with support and visits from her family.

## 2022-03-14 NOTE — PROGRESS NOTES
[facility]  Progress Note  DOS: 3/14/22    S: Doing well this morning, sitting up in chair near window. No headache/vision change/epigastric pain. No contractions or shortness of breath. Feeling well. No new acute concerns      O:  Vitals:    22 1757 22 2040 22 0100 22 0530   BP: 136/77 127/71 130/72 127/70   BP Location: Left arm Left arm Left arm Left arm   Patient Position: Semi-Gibbs's Semi-Gibbs's Semi-Gibbs's Semi-Gibbs's   Cuff Size: Adult Large Adult Large Adult Large Adult Large   Pulse:       Resp: 16 16 16 16   Temp: 98  F (36.7  C) 98.2  F (36.8  C) 98.1  F (36.7  C) 98.1  F (36.7  C)   TempSrc: Oral Oral Oral Oral   SpO2:       Weight:         Intake/Output Summary (Last 24 hours) at 3/14/2022 1301  Last data filed at 3/14/2022 1000  Gross per 24 hour   Intake 3700 ml   Output 4300 ml   Net -600 ml     Gen: Appears well, nad  CV:  Regular rate, well perfused  Abd:  Gravid, soft, non-tender  Extr:     Symmetric, no edema    FHT: Baseline 145, mod variability, + accels, no decels  [place]: 0 contractions noted    Results for orders placed or performed during the hospital encounter of 22 (from the past 24 hour(s))   Glucose by meter   Result Value Ref Range    GLUCOSE BY METER POCT 91 70 - 99 mg/dL   Glucose by meter   Result Value Ref Range    GLUCOSE BY METER POCT 89 70 - 99 mg/dL   Glucose by meter   Result Value Ref Range    GLUCOSE BY METER POCT 105 (H) 70 - 99 mg/dL   Glucose by meter   Result Value Ref Range    GLUCOSE BY METER POCT 88 70 - 99 mg/dL     Vitals:    22 0756 22 0808 22 1000   Weight: 106.1 kg (233 lb 14.4 oz) 105.6 kg (232 lb 14.4 oz) 106.8 kg (235 lb 6.4 oz)       Assessment and Plan:  41 year old  at 28w3d by 11w0d US, now HD#8 for preeclampsia with severe features by headache and severe BP, initially with mild lab changes now resolved x 4 days. Pregnancy is notable for severe FGR and asthma.      Pre-eclampsia  with Severe Features (BP) - stable  - serial BP monitoring   - immediate acting antihypertensives prn for sustained severe range blood pressures, avoid labetalol due to maternal bradycardia  - monitor for symptoms, signs of PEC  - labs every 48 hours, if continue to be normal, space out to every 72 hours  - serial weights, monitor I/O  - admission until anticipated delivery at 34wks d/t pre-E with SF unless indicated earlier  - magnesium during delivery and for 24h postpartum    FWB/severe FGR  - reassuring status at this time  - growth ; EFW 631g, <1%, AC <1%  - twice weekly Dopplers; normal 3/14  - betamethasone x 1 course (3/7-3/8)  - NICU consult complete  - Intrauterine resuscitative measures prn  - TID monitoring and prn  - fetal heart rate today reactive and appropriate for gestational age    Elevated GCT - normal glucose values   - all normal values over the last 24h  - high carb consistent diet  - will plan for GTT once steroid effect has worn off     Persistent maternal Bradycardia  - HR in hospital 40-50's  - asymptomatic  - documented HR of 40's back in 2019  - EKG WNL other than bradycardic  - Seen by cardiology, normal increase in HR with light activity, no further workup at this time - recommends reconsult if symptoms or other concerns identified    PNC  - Rh pos, Rubella immune  - GBS negative  - Other prenatal labs wnl  - Imaging: see imaging tab                 Separation from children  Long hospitalization  - SW and child life consulted  - discussed pt today at SW rounds  - okay to take breaks from floor to visit with children given she is stable w/o preeclampsia or obstetric complaints  - enoxaparin for DVT ppx    Senia Ames MD  Obstetrics & Gynecology PGY-3  9:20 AM 3/14/2022    MFM Attending Attestation  I personally evaluated Pao Pereira with Dr. Ames  and agree with Dr. Ames 's findings, assessment and plan of care as documented in the above note. The  plan of care was formulated by me.  I personally reviewed vital signs, laboratory results, imaging and orders as well as fetal monitoring. See note for details; I have made the necessary edits/additions.  Reactive and reassuring NST interpreted by me today.    BP (!) 142/83 (BP Location: Left arm, Patient Position: Semi-Gibbs's)   Pulse 54   Temp 98.2  F (36.8  C) (Oral)   Resp 16   Wt 106.8 kg (235 lb 6.4 oz)   SpO2 98%   BMI 43.76 kg/m      Pao Aragon MD  , OB/GYN  Maternal-Fetal Medicine  [email or web address]  [phone] (Main M Office)  [phone] or [phone] (for 24 hour MFM questions)  [phone] (Pager)      Time Spent on this Encounter   I spent a total 15 minutes on the encounter with Pao Pereira today   More than 50% of my time was spent on counseling and/or coordination of care   - Counseling the patient and/or family regarding: diagnosis, diagnostic results, prognosis and risks and benefits of management options  - Coordination of care with the: nurse and patient    Date of service (when I saw the patient): March 14, 2022

## 2022-03-14 NOTE — PLAN OF CARE
[facility] vital signs were stable over night except for a pulse of 50 this morning. Patient denies any pain, vaginal discharge, bleeding, or contractions. See flow sheet for FHR tracing and UC documentation. Patient tolerating carb diet and voiding without difficulty. Patient up ad ashanti. Will continue with plan of care.

## 2022-03-14 NOTE — PLAN OF CARE
VSS - BP within normal limits. Denies headache, vision changes, RUQ pain. BG within goal. FHR appropriate for gestational age -  see flowsheets for details. No ctx on toco. Denies LOF/bleeding. Will continue with current plan of care

## 2022-03-15 LAB
ABO/RH(D): NORMAL
ALBUMIN SERPL-MCNC: 2.5 G/DL (ref 3.4–5)
ALP SERPL-CCNC: 78 U/L (ref 40–150)
ALT SERPL W P-5'-P-CCNC: 20 U/L (ref 0–50)
ANION GAP SERPL CALCULATED.3IONS-SCNC: 4 MMOL/L (ref 3–14)
ANTIBODY SCREEN: NEGATIVE
AST SERPL W P-5'-P-CCNC: 16 U/L (ref 0–45)
BILIRUB SERPL-MCNC: 0.3 MG/DL (ref 0.2–1.3)
BUN SERPL-MCNC: 12 MG/DL (ref 7–30)
CALCIUM SERPL-MCNC: 10.4 MG/DL (ref 8.5–10.1)
CHLORIDE BLD-SCNC: 107 MMOL/L (ref 94–109)
CO2 SERPL-SCNC: 25 MMOL/L (ref 20–32)
CREAT SERPL-MCNC: 0.55 MG/DL (ref 0.52–1.04)
ERYTHROCYTE [DISTWIDTH] IN BLOOD BY AUTOMATED COUNT: 13.5 % (ref 10–15)
GFR SERPL CREATININE-BSD FRML MDRD: >90 ML/MIN/1.73M2
GLUCOSE BLD-MCNC: 80 MG/DL (ref 70–99)
GLUCOSE BLDC GLUCOMTR-MCNC: 89 MG/DL (ref 70–99)
GLUCOSE BLDC GLUCOMTR-MCNC: 90 MG/DL (ref 70–99)
GLUCOSE BLDC GLUCOMTR-MCNC: 95 MG/DL (ref 70–99)
GLUCOSE BLDC GLUCOMTR-MCNC: 96 MG/DL (ref 70–99)
HCT VFR BLD AUTO: 37.2 % (ref 35–47)
HGB BLD-MCNC: 12.7 G/DL (ref 11.7–15.7)
MCH RBC QN AUTO: 31.2 PG (ref 26.5–33)
MCHC RBC AUTO-ENTMCNC: 34.1 G/DL (ref 31.5–36.5)
MCV RBC AUTO: 91 FL (ref 78–100)
PLATELET # BLD AUTO: 203 10E3/UL (ref 150–450)
POTASSIUM BLD-SCNC: 4.3 MMOL/L (ref 3.4–5.3)
PROT SERPL-MCNC: 5.7 G/DL (ref 6.8–8.8)
RBC # BLD AUTO: 4.07 10E6/UL (ref 3.8–5.2)
SODIUM SERPL-SCNC: 136 MMOL/L (ref 133–144)
SPECIMEN EXPIRATION DATE: NORMAL
WBC # BLD AUTO: 7.3 10E3/UL (ref 4–11)

## 2022-03-15 PROCEDURE — 59025 FETAL NON-STRESS TEST: CPT | Mod: 26 | Performed by: OBSTETRICS & GYNECOLOGY

## 2022-03-15 PROCEDURE — 36415 COLL VENOUS BLD VENIPUNCTURE: CPT | Performed by: STUDENT IN AN ORGANIZED HEALTH CARE EDUCATION/TRAINING PROGRAM

## 2022-03-15 PROCEDURE — 120N000002 HC R&B MED SURG/OB UMMC

## 2022-03-15 PROCEDURE — 250N000013 HC RX MED GY IP 250 OP 250 PS 637: Performed by: STUDENT IN AN ORGANIZED HEALTH CARE EDUCATION/TRAINING PROGRAM

## 2022-03-15 PROCEDURE — 86850 RBC ANTIBODY SCREEN: CPT | Performed by: STUDENT IN AN ORGANIZED HEALTH CARE EDUCATION/TRAINING PROGRAM

## 2022-03-15 PROCEDURE — 99231 SBSQ HOSP IP/OBS SF/LOW 25: CPT | Mod: 25 | Performed by: OBSTETRICS & GYNECOLOGY

## 2022-03-15 PROCEDURE — 80053 COMPREHEN METABOLIC PANEL: CPT | Performed by: STUDENT IN AN ORGANIZED HEALTH CARE EDUCATION/TRAINING PROGRAM

## 2022-03-15 PROCEDURE — 250N000011 HC RX IP 250 OP 636: Performed by: STUDENT IN AN ORGANIZED HEALTH CARE EDUCATION/TRAINING PROGRAM

## 2022-03-15 PROCEDURE — 85014 HEMATOCRIT: CPT | Performed by: STUDENT IN AN ORGANIZED HEALTH CARE EDUCATION/TRAINING PROGRAM

## 2022-03-15 RX ADMIN — PRENATAL VITAMINS-IRON FUMARATE 27 MG IRON-FOLIC ACID 0.8 MG TABLET 1 TABLET: at 07:45

## 2022-03-15 RX ADMIN — ASPIRIN 81 MG: 81 TABLET, COATED ORAL at 07:45

## 2022-03-15 RX ADMIN — ENOXAPARIN SODIUM 40 MG: 40 INJECTION SUBCUTANEOUS at 19:26

## 2022-03-15 NOTE — PLAN OF CARE
Pt BP 130s/70s this evening. Had headache that was resolved with tylenol. Blood sugar after dinner was 95. Monitoring shows moderate variability with accels, no decels, and no contractions.  visited. Continue to monitor.

## 2022-03-15 NOTE — PROGRESS NOTES
[facility]  Progress Note  DOS: 3/15/22    S: Doing well this morning, sitting up in chair near window. No headache/vision change/epigastric pain. No contractions or shortness of breath. Feeling well. No new acute concerns.      O:  /79 (BP Location: Left arm, Patient Position: Sitting, Cuff Size: Adult Large)   Pulse 54   Temp 97.8  F (36.6  C) (Oral)   Resp 18   Wt 106.8 kg (235 lb 6.4 oz)   SpO2 98%   BMI 43.76 kg/m      Intake/Output: -750 since 0000    Gen: Appears well, nad  CV:  Regular rate, well perfused  Abd:  Gravid, soft, non-tender  Extr:     Symmetric, no edema    FHT: Baseline 145, mod variability, + accels, no decels  [place]: 0 contractions noted    Labs:  HELLP wnl    Assessment and Plan:  41 year old  at 28w4d by 11w0d US, now HD#9 for preeclampsia with severe features by headache and severe BP, initially with mild lab changes now resolved x 5 days. Pregnancy is notable for severe FGR and asthma.     Pre-eclampsia  with Severe Features (BP) - stable  - serial BP monitoring   - immediate acting antihypertensives prn for sustained severe range blood pressures, avoid labetalol due to maternal bradycardia  - monitor for symptoms, signs of PEC  - HELLP labs every 72 hours  - serial weights, monitor I/O  - admission until anticipated delivery at 34wks d/t pre-E with SF unless indicated earlier  - magnesium during delivery and for 24h postpartum    FWB/severe FGR  - reassuring status at this time  - growth ; EFW 631g, <1%, AC <1%  - twice weekly Dopplers; normal 3/14  - betamethasone x 1 course (3/7-3/8)  - NICU consult complete  - Intrauterine resuscitative measures prn  - TID monitoring and prn  - fetal heart rate today reactive and appropriate for gestational age    Elevated GCT - normal glucose values   - all normal values over the last 24h  - high carb consistent diet  - will plan for GTT once steroid effect has worn off - tentatively plan to do  ~3/19/22     Persistent maternal Bradycardia  - HR in hospital 40-50's  - asymptomatic  - documented HR of 40's back in 2019  - EKG WNL other than bradycardic  - Seen by cardiology, normal increase in HR with light activity, no further workup at this time - recommends reconsult if symptoms or other concerns identified    PNC  - Rh pos, Rubella immune  - GBS negative  - Other prenatal labs wnl  - Imaging: see imaging tab                 Separation from children  Long hospitalization  - SW and child life consulted  - okay to take breaks from floor to visit with children given she is stable w/o preeclampsia or obstetric complaints  - enoxaparin for DVT ppx  - Doing PT in room for extended hospitalization    Katina Frank, MS4  6:53 AM  March 15, 2022  Physician Attestation  I was present with the medical student who participated in the service and in the documentation of the note. I have verified the history and personally performed the physical exam and medical decision making. I agree with the assessment and plan of care as documented in the note.    Dr. Jose Manuel Ibarra 3/15/2022       Key findings: In summary, Pao is a  at 28w4d with  preeclampsia with severe features, currently stable.  Pregnancy also complicated by fetal growth restrictoin.  She meets criteria to continue inpatient expectant management with goal of delivery at 34 weeks.    I spent 15 minutes at the bedside in evaluation and discussion of management. I reviewed her NST, which is reassuring and reactive, on the unit.       Jose Manuel Ibarra MD  Maternal Fetal Medicine  Date of Service (when I saw the patient): 03/15/22

## 2022-03-15 NOTE — PLAN OF CARE
Goal Outcome Evaluation:      Pt is  at 28.4 wks here w Pre E w SF; GDM - diet controlled; FGR; maternal bradycardia.  Pt is stable and comfortable. No reported s/s of PreE; no reported s/s of PTL.  Intact. No vag bleeding; +FM.  Pt reported feeling briefly tingling in her arms - resolved quickly with position change - occurred last night - per pt, this has happened in the past.  Dr Ibarra and MS updated on all during M morning rounds.  Pt will put on her call light if any changes in her condition.  Call light is within reach.  Pt may see family today - w wheelchair ride per Dr Ibarra.

## 2022-03-15 NOTE — PROGRESS NOTES
S: Status Update  B:  at 28.4 weeks gestation admitted for pre-eclampsia with severe features and FGR. Allergies: Dust mites and Nickel. Labs: O Positive, GBS: Negative, Rubella: Immune, HIV: Non-Reactive, Hep B: Non-Reactive, COVID: Negative. Pregnancy Related Complications: Pre-Eclampsia with severe features, AMA, fetal growth restriction, GDM, persistent maternal bradycardia, obesity.  A: Has been resting throughout the night. Vital signs WDL, afebrile. Denies headache, vision changes and RUQ pain. Reports positive fetal movement. Denies leaking of fluid and vaginal bleeding. Fasting blood glucose 90. Up in room ad ashanti. EFM/[place] appropriate for gestational age - see flowsheet. Monitoring intake and output, as well as daily weights. Excited that children may visit today because they are on spring break.   R: Continue with current plan of care. Report given to BETTY You. Care relinquished.

## 2022-03-16 LAB
ATRIAL RATE - MUSE: 44 BPM
DIASTOLIC BLOOD PRESSURE - MUSE: NORMAL MMHG
GLUCOSE BLDC GLUCOMTR-MCNC: 84 MG/DL (ref 70–99)
INTERPRETATION ECG - MUSE: NORMAL
P AXIS - MUSE: 11 DEGREES
PR INTERVAL - MUSE: 136 MS
QRS DURATION - MUSE: 74 MS
QT - MUSE: 448 MS
QTC - MUSE: 383 MS
R AXIS - MUSE: 59 DEGREES
SYSTOLIC BLOOD PRESSURE - MUSE: NORMAL MMHG
T AXIS - MUSE: 29 DEGREES
VENTRICULAR RATE- MUSE: 44 BPM

## 2022-03-16 PROCEDURE — 250N000011 HC RX IP 250 OP 636: Performed by: STUDENT IN AN ORGANIZED HEALTH CARE EDUCATION/TRAINING PROGRAM

## 2022-03-16 PROCEDURE — 120N000002 HC R&B MED SURG/OB UMMC

## 2022-03-16 PROCEDURE — 250N000013 HC RX MED GY IP 250 OP 250 PS 637: Performed by: STUDENT IN AN ORGANIZED HEALTH CARE EDUCATION/TRAINING PROGRAM

## 2022-03-16 PROCEDURE — 99231 SBSQ HOSP IP/OBS SF/LOW 25: CPT | Mod: 25 | Performed by: OBSTETRICS & GYNECOLOGY

## 2022-03-16 PROCEDURE — 59025 FETAL NON-STRESS TEST: CPT | Mod: 26 | Performed by: OBSTETRICS & GYNECOLOGY

## 2022-03-16 RX ADMIN — ENOXAPARIN SODIUM 40 MG: 40 INJECTION SUBCUTANEOUS at 19:42

## 2022-03-16 RX ADMIN — PRENATAL VITAMINS-IRON FUMARATE 27 MG IRON-FOLIC ACID 0.8 MG TABLET 1 TABLET: at 09:28

## 2022-03-16 RX ADMIN — ASPIRIN 81 MG: 81 TABLET, COATED ORAL at 09:28

## 2022-03-16 NOTE — PROGRESS NOTES
[facility]  Progress Note  DOS: 3/15/22    S: Doing well this morning. Didn't sleep very well, but feels well rested. No headache/vision change/epigastric pain. No contractions or shortness of breath. No new acute concerns. She is very agreeable to discontinuing BGL checks until the GTT on 3/19.       O:  /58 (BP Location: Left arm, Patient Position: Supine)   Pulse 61   Temp 98.1  F (36.7  C) (Oral)   Resp 16   Wt 105.5 kg (232 lb 8 oz)   SpO2 98%   BMI 43.22 kg/m      Intake/Output: 100 since 0000    Gen: Appears well, nad  CV:  Regular rate, well perfused  Abd:  Gravid, soft, non-tender  Extr:     Symmetric, no edema    FHT: Baseline 150, mod variability, + accels, no decels  [place]: 0 contractions noted    Labs:  No new labs today    Assessment and Plan:  41 year old  at 28w5d by 11w0d US, now HD#10 for preeclampsia with severe features by headache and severe BP, initially with mild lab changes now resolved x 5 days. Pregnancy is notable for severe FGR and asthma.     Pre-eclampsia  with Severe Features (BP) - stable  - serial BP monitoring   - immediate acting antihypertensives prn for sustained severe range blood pressures, avoid labetalol due to maternal bradycardia  - monitor for symptoms, signs of PEC  - HELLP labs every 72 hours  - serial weights, monitor I/O  - admission until anticipated delivery at 34wks d/t pre-E with SF unless indicated earlier  - magnesium during delivery and for 24h postpartum    FWB/severe FGR  - reassuring status at this time  - growth ; EFW 631g, <1%, AC <1%  - twice weekly Dopplers; normal 3/14  - betamethasone x 1 course (3/7-3/8)  - NICU consult complete  - Intrauterine resuscitative measures prn  - TID monitoring and prn  - fetal heart rate today reactive and appropriate for gestational age    Elevated GCT - normal glucose values   - high carb consistent diet  - will plan for GTT once steroid effect has worn off -  tentatively plan to do ~3/19/22  - will stop blood glucose checks due to normal levels over past 3 days.      Persistent maternal Bradycardia  - HR in hospital 40-50's  - asymptomatic  - documented HR of 40's back in 2019  - EKG WNL other than bradycardic  - Seen by cardiology, normal increase in HR with light activity, no further workup at this time - recommends reconsult if symptoms or other concerns identified    PNC  - Rh pos, Rubella immune  - GBS negative  - Other prenatal labs wnl  - Imaging: see imaging tab                 Separation from children  Long hospitalization  - SW and child life consulted  - okay to take breaks from floor to visit with children given she is stable w/o preeclampsia or obstetric complaints  - enoxaparin for DVT ppx  - Doing PT in room for extended hospitalization    Katina Frank, MS4  7:41 AM  March 16, 2022  I was present with Katina Frank who participated in the service and in the documentation of the note. I have verified the history and personally performed the physical exam and medical decision making. I agree with the assessment and plan of care as documented in the note.    Signed & Dated: March 16, 2022    I spent a total of 15 minutes face-to-face with this patient counseling and coordinating care as described above. More than 50% of the time was in coordination of care and discussion of management of care.      Luan Walker M.D.  Maternal Fetal Medicine

## 2022-03-16 NOTE — PLAN OF CARE
"  Problem: Plan of Care - These are the overarching goals to be used throughout the patient stay.    Goal: Plan of Care Review/Shift Note  Description: The Plan of Care Review/Shift note should be completed every shift.  The Outcome Evaluation is a brief statement about your assessment that the patient is improving, declining, or no change.  This information will be displayed automatically on your shift note.  Outcome: Ongoing, Progressing  Goal: Patient-Specific Goal (Individualized)  Description: You can add care plan individualizations to a care plan. Examples of Individualization might be:  \"Parent requests to be called daily at 9am for status\", \"I have a hard time hearing out of my right ear\", or \"Do not touch me to wake me up as it startles me\".  Outcome: Ongoing, Progressing  Goal: Absence of Hospital-Acquired Illness or Injury  Outcome: Ongoing, Progressing  Intervention: Prevent Skin Injury  Recent Flowsheet Documentation  Taken 3/15/2022 2339 by Chen Chase RN  Body Position: position changed independently  Intervention: Prevent and Manage VTE (Venous Thromboembolism) Risk  Recent Flowsheet Documentation  Taken 3/15/2022 2339 by Chen Chase RN  Activity Management: up ad ashanti  Goal: Optimal Comfort and Wellbeing  Outcome: Ongoing, Progressing  Intervention: Provide Person-Centered Care  Recent Flowsheet Documentation  Taken 3/15/2022 2339 by Chen Chase RN  Trust Relationship/Rapport:   care explained   choices provided   emotional support provided  Goal: Readiness for Transition of Care  Outcome: Ongoing, Progressing     Problem: Hypertensive Disorders in Pregnancy  Goal: Maternal-Fetal Stabilization  Outcome: Ongoing, Progressing   Goal Outcome Evaluation:  VSS and BG 83. Denies s/sx of PreE overnight. Slept well. Denies pain or contractions. Appreciating good fetal movement. Ambulating independently in room and in the halls.                     "

## 2022-03-16 NOTE — PROGRESS NOTES
At bedside, daily rounding. Plan to do GTT on 3/19/22. Discontinuing BG checks. Patient agrees to plan of care.

## 2022-03-16 NOTE — PLAN OF CARE
Data: Blood pressures increasing. Signs and symptoms of pre-eclampsia not present. Fetal assessment Appropriate for Gestational Age.   Interventions: Monitor blood pressures and indicators of pre-eclampsia every 4 hours. Continue uterine/fetal assessment TID, Activity level Regular activity. Encourage active range of motion and frequent position changes.  Plan: Continue expectant management. Observe for and notify care provider of indicators of worsening pre-eclampsia or signs of fetal/maternal compromise. GTT on 3/19/  Goal Outcome Evaluation:    Plan of Care Reviewed With: patient  Overall Patient Progress: no change

## 2022-03-16 NOTE — PLAN OF CARE
Patient stable this shift.  VSS.  Denies LOF, cramping/vania or vaginal bleeding. See flow sheet for FHR and contraction pattern.  Offers no complaints.  Continue with routine cares and will notify provider if there is a change in status.

## 2022-03-17 ENCOUNTER — APPOINTMENT (OUTPATIENT)
Dept: PHYSICAL THERAPY | Facility: CLINIC | Age: 42
End: 2022-03-17
Payer: COMMERCIAL

## 2022-03-17 ENCOUNTER — APPOINTMENT (OUTPATIENT)
Dept: ULTRASOUND IMAGING | Facility: CLINIC | Age: 42
End: 2022-03-17
Attending: STUDENT IN AN ORGANIZED HEALTH CARE EDUCATION/TRAINING PROGRAM
Payer: COMMERCIAL

## 2022-03-17 PROCEDURE — 76818 FETAL BIOPHYS PROFILE W/NST: CPT | Mod: 26 | Performed by: OBSTETRICS & GYNECOLOGY

## 2022-03-17 PROCEDURE — 97530 THERAPEUTIC ACTIVITIES: CPT | Mod: GP | Performed by: PHYSICAL THERAPIST

## 2022-03-17 PROCEDURE — 99231 SBSQ HOSP IP/OBS SF/LOW 25: CPT | Mod: 25 | Performed by: OBSTETRICS & GYNECOLOGY

## 2022-03-17 PROCEDURE — 76820 UMBILICAL ARTERY ECHO: CPT | Mod: 26 | Performed by: OBSTETRICS & GYNECOLOGY

## 2022-03-17 PROCEDURE — 250N000013 HC RX MED GY IP 250 OP 250 PS 637: Performed by: STUDENT IN AN ORGANIZED HEALTH CARE EDUCATION/TRAINING PROGRAM

## 2022-03-17 PROCEDURE — 250N000011 HC RX IP 250 OP 636: Performed by: STUDENT IN AN ORGANIZED HEALTH CARE EDUCATION/TRAINING PROGRAM

## 2022-03-17 PROCEDURE — 97116 GAIT TRAINING THERAPY: CPT | Mod: GP | Performed by: PHYSICAL THERAPIST

## 2022-03-17 PROCEDURE — 120N000002 HC R&B MED SURG/OB UMMC

## 2022-03-17 PROCEDURE — 76819 FETAL BIOPHYS PROFIL W/O NST: CPT

## 2022-03-17 RX ADMIN — ASPIRIN 81 MG: 81 TABLET, COATED ORAL at 09:17

## 2022-03-17 RX ADMIN — PRENATAL VITAMINS-IRON FUMARATE 27 MG IRON-FOLIC ACID 0.8 MG TABLET 1 TABLET: at 09:17

## 2022-03-17 RX ADMIN — ENOXAPARIN SODIUM 40 MG: 40 INJECTION SUBCUTANEOUS at 19:47

## 2022-03-17 NOTE — PLAN OF CARE
Patient's vitals stable and patient is afebrile. Her BPs have been within normal limits. She denies headache, vision changes, upper gastric pain, vaginal bleeding, LOF, contractions, and decrease in fetal movement. The patient is pleasant and cooperative. Strict I&Os have been charted. Fetal heart monitor charted in flowsheet.

## 2022-03-17 NOTE — PROGRESS NOTES
[facility]  Progress Note  DOS: 3/17/22    Subjective:  Doing well this morning. No headache/vision change/epigastric pain. No contractions or shortness of breath. No new acute concerns.     Objective:  /63   Pulse 61   Temp 98.3  F (36.8  C) (Oral)   Resp 18   Wt 105.5 kg (232 lb 8 oz)   SpO2 98%   BMI 43.22 kg/m      Gen: Appears well, nad  CV:  Regular rate, well perfused  Abd:  Gravid, soft, non-tender  Extr:     Symmetric, no edema    FHT: Baseline 150, mod variability, + accels, no decels  [place]: 0 contractions noted    Intake/Output: -300 since 0000  Labs: No new labs today    Assessment and Plan:  41 year old  at 28w6d by 11w0d US, now HD#11 for preeclampsia with severe features. Pregnancy is otherwise complicated by severe FGR and asthma.     Pre-eclampsia  with Severe Features (BP) - stable  - No new signs or symptoms of preeclampsia.  - Continue serial BP monitoring. Alert with sustained SBP >160 and/or DBP >110 for acute treatment with antihypertensives.  Plan to avoid labetalol at this time due to maternal bradycardia.  - HELLP labs every 72 hours  - Serial weights, monitor I/O  - Admission until anticipated delivery at 34wks d/t pre-E with SF unless indicated earlier. Plan to reinitiate magnesium during induction/delivery and for 24h postpartum.    Elevated GCT   - Plan to complete GTT once out of steroid window effect. Tentatively plan to do ~3/19/22.  - Continue high carb consistent diet    Persistent maternal Bradycardia  - Previous documentation from prior hospitalization and outside pregnancy of HR in 40-50s.  - Patient asymptomatic.   - EKG WNL other than bradycardic  - S/p cardiology, normal increase in HR with light activity, no further workup at this time - recommends reconsult if symptoms or other concerns identified    Severe FGR  Fetal well being  - Last growth ; EFW 631g, <1%, AC <1%  - Continue twice weekly Dopplers and BPP. 3/17  pending results  - Continue TID monitoring and prn. FHT reactive and reassuring for gestational age today.  - S/p Betamethasone (3/7-3/8). Rescue course as clinically indicated.  - S/p NICU consult    PNC  - Labs: Rh pos, Rubella immune. GBS negative (3/7). Other prenatal labs wnl  - Immunizations: S/p Tdap, COVID vaccines  - Genetics: low risk NIPT  - PPx: Lovenox initiated.  - Other: SW involved to assist with separation from children/long hospitalization.                Katina Frank, MS4  March 17, 2022  6:53 AM    ----------  I was present with the medical student who participated in the service and in the documentation of the note. I have verified the history and personally performed the physical exam and medical decision making. I agree with the assessment and plan of care as documented in the note.    Cheyenne Nguyen MD  Maternal Fetal Medicine Fellow  3/17/2022 10:17 AM      M Attending Attestation  I personally evaluated Pao Pereira with Dr. Nguyen and agree with Dr. Nguyen's findings, assessment and plan of care as documented in the above note. The plan of care was formulated by me.  I personally reviewed vital signs, laboratory results, imaging and orders as well as fetal monitoring. See note for details; I have made the necessary edits/additions.  Reassuring fetal monitoring, appropriate for gestational age, reviewed by me.      Pao Aragon MD  , OB/GYN  Maternal-Fetal Medicine  [email or web address]  [phone] (Main [facility] Office)  [phone] or [phone] (for 24 hour [facility] questions)  [phone] (Pager)      Time Spent on this Encounter   I spent a total 15 minutes on the encounter with Pao Pereira today   More than 50% of my time was spent on counseling and/or coordination of care   - Counseling the patient and/or family regarding: diagnosis, diagnostic results, prognosis and risks and benefits of management options  - Coordination of care with the: nurse and patient    Date  of service (when I saw the patient): March 17, 2022

## 2022-03-17 NOTE — PLAN OF CARE
"  Problem: Plan of Care - These are the overarching goals to be used throughout the patient stay.    Goal: Plan of Care Review/Shift Note  Description: The Plan of Care Review/Shift note should be completed every shift.  The Outcome Evaluation is a brief statement about your assessment that the patient is improving, declining, or no change.  This information will be displayed automatically on your shift note.  Outcome: Ongoing, Progressing  Goal: Patient-Specific Goal (Individualized)  Description: You can add care plan individualizations to a care plan. Examples of Individualization might be:  \"Parent requests to be called daily at 9am for status\", \"I have a hard time hearing out of my right ear\", or \"Do not touch me to wake me up as it startles me\".  Outcome: Ongoing, Progressing  Goal: Absence of Hospital-Acquired Illness or Injury  Outcome: Ongoing, Progressing  Intervention: Prevent Skin Injury  Recent Flowsheet Documentation  Taken 3/16/2022 2200 by Chen Chase RN  Body Position: position changed independently  Intervention: Prevent and Manage VTE (Venous Thromboembolism) Risk  Recent Flowsheet Documentation  Taken 3/16/2022 2200 by Chen Chase RN  Activity Management: up ad ashanti  Goal: Optimal Comfort and Wellbeing  Outcome: Ongoing, Progressing  Intervention: Provide Person-Centered Care  Recent Flowsheet Documentation  Taken 3/16/2022 2200 by Chen Chase RN  Trust Relationship/Rapport:   care explained   choices provided   emotional support provided   empathic listening provided   questions answered   questions encouraged   reassurance provided   thoughts/feelings acknowledged  Goal: Readiness for Transition of Care  Outcome: Ongoing, Progressing     Problem: Hypertensive Disorders in Pregnancy  Goal: Maternal-Fetal Stabilization  Outcome: Ongoing, Progressing   Goal Outcome Evaluation:  VSS overnight. Denies s/sx of PreE. Showered and ambulating independently. US this AM. Tearful this am. " Reassurance given.

## 2022-03-17 NOTE — PROGRESS NOTES
"   03/17/22 9818   Child Life   Intervention Supportive Check In   Family Support Comment CCLS provided supportive check-in, conversation and listening to patient at bedside. Patient shared updates re: son and daughter's current coping and engagement with resources provided. Patient reported that the shared journals have been \"a huge hit\" and have allowed for opportunities for patient to share appropriate thoughts and feelings with children and visa versa. Expressive art also takes place in journals. Patient feels as though children are well supported and understanding hospitalization appropriately; family has shared more insights into patient's daily cares in age-appropriate ways. CCLS encouraged continued information sharing and validated patient's meaningful and intentional efforts to support connection and positive coping.    Patient appears to be coping appropriately with admission noted by ability to engage in reflection and conversation about personal coping strategies. She mentions sharing current admission with more close, social supports which she has found helpful to her own coping; Mentioning how beneficial her daily visits from  are, too.   Sibling Support Comment Son and daughter expected to transition back to school from spring break next week. Patient has plans to communicate current admission with classroom teachers to increase global awareness and social support.   Outcomes/Follow Up Continue to Follow/Support    Please call *[phone] with additional needs     "

## 2022-03-18 LAB
ALT SERPL W P-5'-P-CCNC: 27 U/L (ref 0–50)
AST SERPL W P-5'-P-CCNC: 18 U/L (ref 0–45)
CREAT SERPL-MCNC: 0.72 MG/DL (ref 0.52–1.04)
ERYTHROCYTE [DISTWIDTH] IN BLOOD BY AUTOMATED COUNT: 13.4 % (ref 10–15)
GFR SERPL CREATININE-BSD FRML MDRD: >90 ML/MIN/1.73M2
HCT VFR BLD AUTO: 37.1 % (ref 35–47)
HGB BLD-MCNC: 12.6 G/DL (ref 11.7–15.7)
MCH RBC QN AUTO: 31.4 PG (ref 26.5–33)
MCHC RBC AUTO-ENTMCNC: 34 G/DL (ref 31.5–36.5)
MCV RBC AUTO: 93 FL (ref 78–100)
PLATELET # BLD AUTO: 186 10E3/UL (ref 150–450)
RBC # BLD AUTO: 4.01 10E6/UL (ref 3.8–5.2)
WBC # BLD AUTO: 7.9 10E3/UL (ref 4–11)

## 2022-03-18 PROCEDURE — 59025 FETAL NON-STRESS TEST: CPT | Mod: 26 | Performed by: OBSTETRICS & GYNECOLOGY

## 2022-03-18 PROCEDURE — 36415 COLL VENOUS BLD VENIPUNCTURE: CPT | Performed by: STUDENT IN AN ORGANIZED HEALTH CARE EDUCATION/TRAINING PROGRAM

## 2022-03-18 PROCEDURE — 84450 TRANSFERASE (AST) (SGOT): CPT | Performed by: STUDENT IN AN ORGANIZED HEALTH CARE EDUCATION/TRAINING PROGRAM

## 2022-03-18 PROCEDURE — 85027 COMPLETE CBC AUTOMATED: CPT | Performed by: STUDENT IN AN ORGANIZED HEALTH CARE EDUCATION/TRAINING PROGRAM

## 2022-03-18 PROCEDURE — 99231 SBSQ HOSP IP/OBS SF/LOW 25: CPT | Mod: 25 | Performed by: OBSTETRICS & GYNECOLOGY

## 2022-03-18 PROCEDURE — 250N000013 HC RX MED GY IP 250 OP 250 PS 637: Performed by: STUDENT IN AN ORGANIZED HEALTH CARE EDUCATION/TRAINING PROGRAM

## 2022-03-18 PROCEDURE — 120N000002 HC R&B MED SURG/OB UMMC

## 2022-03-18 PROCEDURE — 82565 ASSAY OF CREATININE: CPT | Performed by: STUDENT IN AN ORGANIZED HEALTH CARE EDUCATION/TRAINING PROGRAM

## 2022-03-18 PROCEDURE — 84460 ALANINE AMINO (ALT) (SGPT): CPT | Performed by: STUDENT IN AN ORGANIZED HEALTH CARE EDUCATION/TRAINING PROGRAM

## 2022-03-18 PROCEDURE — 250N000011 HC RX IP 250 OP 636: Performed by: STUDENT IN AN ORGANIZED HEALTH CARE EDUCATION/TRAINING PROGRAM

## 2022-03-18 RX ADMIN — PRENATAL VITAMINS-IRON FUMARATE 27 MG IRON-FOLIC ACID 0.8 MG TABLET 1 TABLET: at 08:12

## 2022-03-18 RX ADMIN — ENOXAPARIN SODIUM 40 MG: 40 INJECTION SUBCUTANEOUS at 19:54

## 2022-03-18 RX ADMIN — ASPIRIN 81 MG: 81 TABLET, COATED ORAL at 08:12

## 2022-03-18 NOTE — PROGRESS NOTES
Acupuncture Clinical Internship Intake and Treatment Documentation   [facility]    Date:  3/18/2022  Patient Name:  Pao Pereira   YOB: 1980     Repeat Patient:  no  Has patient had acupoint/acupressure treatment before:  no    Signed consent placed in the medical record:  yes  Patient/Family verbalizes understanding of risks and benefits:  yes  Required information provided to patient:  yes    Diagnosis:  Encounter for triage in pregnant patient [Z36.89]  Preeclampsia [O14.90]    Patient condition and treatment:  Hypertension during pregnancy   Reason for Intervention Today/Chief Complaint:  Mild headache and low back acheness    Isolation:  No  Type:  None    PRE-SCORE:  mild    Other Western medical information:  NA      Medications   Current Facility-Administered Medications:     acetaminophen (TYLENOL) tablet 650 mg, 650 mg, Oral, Q4H PRN, Eber Simmons MD, 650 mg at 03/14/22 1806    aspirin EC tablet 81 mg, 81 mg, Oral, Daily, Roseann Dempsey MD, 81 mg at 03/18/22 0812    glucose gel 15-30 g, 15-30 g, Oral, Q15 Min PRN **OR** dextrose 50 % injection 25-50 mL, 25-50 mL, Intravenous, Q15 Min PRN **OR** glucagon kit 1 mg, 1 mg, Subcutaneous, Q15 Min PRN, Pamela Mireles APRN CNP    enoxaparin ANTICOAGULANT (LOVENOX) injection 40 mg, 40 mg, Subcutaneous, Q24H, Roseann Dempsey MD, 40 mg at 03/17/22 1947    [START ON 3/19/2022] glucose tolerance test (GLUCOLA) 33% oral liquid 100 g, 100 g, Oral, Once, Cheyenne Nguyen MD    hydrALAZINE (APRESOLINE) injection 10 mg, 10 mg, Intravenous, Q20 Min PRN, Eber Simmons MD    labetalol (NORMODYNE/TRANDATE) algorithm-medication instruction, , Does not apply, Continuous PRN, Eber Simmons MD    labetalol (NORMODYNE/TRANDATE) injection 20-80 mg, 20-80 mg, Intravenous, Q10 Min PRN, Eber Simmons MD, 20 mg at 03/07/22 2115    lactated ringers BOLUS 1,000 mL, 1,000 mL, Intravenous, Once PRN **OR** lactated ringers  BOLUS 500 mL, 500 mL, Intravenous, Once PRN, Eber Simmons MD    LORazepam (ATIVAN) injection 2 mg, 2 mg, Intravenous, Q3 Min PRN, Eber Simmons MD    magnesium sulfate 2 g in water intermittent infusion, 2 g, Intravenous, Once PRN seizures, Eber Simmons MD    magnesium sulfate 4 g in 100 mL sterile water (premade), 4 g, Intravenous, Once PRN seizures, Eber Simmons MD    magnesium sulfate injection 10 g, 10 g, Intramuscular, Once PRN, Eber Simmons MD    metoclopramide (REGLAN) injection 10 mg, 10 mg, Intravenous, Q6H PRN **OR** metoclopramide (REGLAN) tablet 10 mg, 10 mg, Oral, Q6H PRN, Eber Simmons MD    ondansetron (ZOFRAN-ODT) ODT tab 4 mg, 4 mg, Oral, Q6H PRN **OR** ondansetron (ZOFRAN) injection 4 mg, 4 mg, Intravenous, Q6H PRN, Eber Simmons MD    prenatal multivitamin w/iron per tablet 1 tablet, 1 tablet, Oral, Daily, Roseann Dempsey MD, 1 tablet at 03/18/22 0812    prochlorperazine (COMPAZINE) injection 10 mg, 10 mg, Intravenous, Q6H PRN **OR** prochlorperazine (COMPAZINE) tablet 10 mg, 10 mg, Oral, Q6H PRN **OR** prochlorperazine (COMPAZINE) suppository 25 mg, 25 mg, Rectal, Q12H PRN, Eber Simmons MD    sodium chloride (PF) 0.9% PF flush 3 mL, 3 mL, Intracatheter, Q8H, María Dempsey MD, 3 mL at 03/18/22 0549    No current outpatient medications on file.       Pre-Treatment Assessment  Chief Complaint/ Reason for Intervention Today:  Dull frontal headche and low back acheness  Chief Complaint Pre-Score:  mild   Describe:  Mild headache frontal 3/10 constant, sometimes see floaters on both eyes, low back acheness  Pain Location: frontal head, lower back  Pre Session Pain:  Mild  Pre Session Anxiety:  None  Pre Session Nausea:  None    10 Traditional Chinese Medicine Assessment Questions  - Cold/ Heat:  Not specific  - Sweat: night time sweat sometimes   - Headaches/Body aches: history of migraine usually in the back of the head area, like warm with that. Dull frontal headche and low  back acheness   - Chest/Abdomen:  NA  - Digestion: better in hospital  - Bowel Movement/Urination:  Good  - Hearing/Vision: floaters in the eyes from the headache  - Sleep (prior to hospital): good  - Energy:  Good, but more fatigue than last 2 pregnancy   - Emotions:  good  - Ob Gyn:  NA  - Miscellaneous: Na    Traditional Chinese Medicine Assessment  - TONGUE:  Pale, puffy  - PULSE:  Slippery and strong  - OBSERVATIONS:  good    Traditional Chinese Medicine Diagnosis  - BRANCH: Frontal headache due to liver yang raising  - ROOT:  Liver blood Deficiency    Traditional Chinese Medicine Treatment  - ACUPUNCTURE:  Sp3, gb41, yintang, ears shenmen, point 0, irving tong sabas (left) kid9    - NEEDLE COUNT: In: 13   Out: 13   Time In: 2:00 out 2:20pm     Magnet informed consent signed and given:  yes    Post Treatment Assessment  Chief complaint post score:  better  Post Session Observation:  Patient like the treatment  Patient/Family Education:  Na  Verbal information provided:  yes  Written information provided:  yes  All questions answered at time of treatment:  yes    Treatment/Procedure(s) performed by: Pati Leon    Date: 3/18/2022     I attest that this acupuncture treatment was done under my supervision and this note is complete and true.     Supervising acupuncturist:  Shay Gleason LAc [facility] #: [id number]  P: [phone]

## 2022-03-18 NOTE — PROVIDER NOTIFICATION
03/17/22 2125   Provider Notification   Provider Name/Title FEMI Dempsey   Method of Notification In Department   Request Evaluate - Remote   Notification Reason Other (Comment)  (RN requesting provider to review FHR tracing)     Writer requesting review of FHR tracing by provider. Periods of difficulty tracing FHR, RN at bedside continuously adjusting US. Spontaneous decel noted at 2106, w/ spontaneous resolution, moderate variability and accels present. TOCO quiet. Plan per provider to extend monitoring for 20 minutes.     ADDENDUM @ 2137: tracing reviewed with Dr. Dempsey. No decels, moderate variability and accels. Okay to discontinue EFM now. Will plan for AM monitoring and PRN.

## 2022-03-18 NOTE — PLAN OF CARE
Goal Outcome Evaluation:  Patient's vitals signs are stable. Pt denies headache, bleeding, cramping, or leaking fluids. FHR and uterine activity see flow sheet. Continue with plan of care.

## 2022-03-18 NOTE — PROGRESS NOTES
[facility]  Progress Note  DOS: 3/18/2022     Subjective:   Doing well this morning. Denies headache, changes in vision, chest pain, shortness of breath, RUQ pain or other systemic symptoms. Confirms active fetal movement. No new questions or concerns.    Objective:  Vitals:    22 0234 22 0535 22 0553 22 0816   BP: 107/60  138/71 (!) 140/71   BP Location: Left arm  Left arm    Patient Position: Semi-Gibbs's  Semi-Gibbs's    Cuff Size: Adult Large  Adult Large Adult Large   Pulse:    55   Resp: 16  18 17   Temp:   98.7  F (37.1  C) 98  F (36.7  C)   TempSrc:   Oral Oral   SpO2:       Weight:  105.3 kg (232 lb 3.2 oz)       Gen: Appears well, nad  CV:  Regular rate, well perfused  Abd:  Gravid, soft, non-tender  Extr:       Symmetric, no edema    FHT: Baseline 150, mod variability, + accels, isolated decel  [place]: 0 contractions noted    Labs:  Results for orders placed or performed during the hospital encounter of 22 (from the past 24 hour(s))   Creatinine   Result Value Ref Range    Creatinine 0.72 0.52 - 1.04 mg/dL    GFR Estimate >90 >60 mL/min/1.73m2   AST   Result Value Ref Range    AST 18 0 - 45 U/L   ALT   Result Value Ref Range    ALT 27 0 - 50 U/L   CBC with platelets   Result Value Ref Range    WBC Count 7.9 4.0 - 11.0 10e3/uL    RBC Count 4.01 3.80 - 5.20 10e6/uL    Hemoglobin 12.6 11.7 - 15.7 g/dL    Hematocrit 37.1 35.0 - 47.0 %    MCV 93 78 - 100 fL    MCH 31.4 26.5 - 33.0 pg    MCHC 34.0 31.5 - 36.5 g/dL    RDW 13.4 10.0 - 15.0 %    Platelet Count 186 150 - 450 10e3/uL     Assessment and Plan:  Pao Pereira is a 41 year old  at 28w6d by 11w0d US, now HD#12 for preeclampsia with severe features. Pregnancy is otherwise complicated by severe FGR and asthma.     Pre-eclampsia  with Severe Features (BP) - stable  - No new signs or symptoms of preeclampsia.  - Continue serial BP monitoring. Alert with sustained SBP >160 and/or DBP  >110 for acute treatment with antihypertensives.  Plan to avoid labetalol at this time due to maternal bradycardia.  - Continue HELLP labs every 72 hours. Of note, creatinine has increased since admission -= 0.55 > 0.72, but not yet of concern.  - Continue Serial weights and strict I/O  - Admission until anticipated delivery at 34wks d/t pre-E with SF unless indicated earlier. Plan to reinitiate magnesium during induction/delivery and for 24h postpartum.    Elevated GCT   - Plan to complete GTT once out of steroid window effect. Tentatively plan to do ~3/19/22.  - Continue high carb consistent diet    Persistent maternal Bradycardia  - Previous documentation from prior hospitalization and outside pregnancy of HR in 40-50s.  - Patient asymptomatic.   - EKG WNL other than bradycardic  - S/p cardiology, normal increase in HR with light activity, no further workup at this time - recommends reconsult if symptoms or other concerns identified    Severe FGR  Fetal well being  - Last growth 2/28; EFW 631g, <1%, AC <1%  - Continue twice weekly Dopplers and BPP, last 3/17 10/10.  - Continue TID monitoring and prn. FHT reactive and reassuring for gestational age today.  - S/p Betamethasone (3/7-3/8). Rescue course as clinically indicated.  - S/p NICU consult    PNC  - Labs: Rh pos, Rubella immune. GBS negative (3/7). Other prenatal labs wnl  - Immunizations: S/p Tdap, COVID vaccines  - Genetics: low risk NIPT  - PPx: Lovenox initiated.  - Other: SW involved to assist with separation from children/long hospitalization.                Seen and staffed with Dr. Mahesh Nguyen MD  Maternal Fetal Medicine Fellow  3/18/2022 8:56 AM      MFM Attending Attestation  I personally evaluated Pao Pereira with Dr. Nguyen and agree with Dr. Nguyen's findings, assessment and plan of care as documented in the above note. The plan of care was formulated by me.  I personally reviewed vital signs, laboratory results, imaging and orders as  well as fetal monitoring. See note for details; I have made the necessary edits/additions.  Reassuring fetal monitoring, reviewed and interpreted by me.     Pao Aragon MD  , OB/GYN  Maternal-Fetal Medicine  [email or web address]  [phone] (Main M Office)  [id number] or [phone] (for 24 hour MFM questions)  [phone] (Pager)      Time Spent on this Encounter   I spent a total 15 minutes on the encounter with Pao Pereira today   More than 50% of my time was spent on counseling and/or coordination of care   - Counseling the patient and/or family regarding: diagnosis, diagnostic results, prognosis and risks and benefits of management options  - Coordination of care with the: nurse and patient    Date of service (when I saw the patient): March 18, 2022

## 2022-03-18 NOTE — PROGRESS NOTES
CLINICAL NUTRITION SERVICES - REASSESSMENT NOTE     Nutrition Prescription    RECOMMENDATIONS FOR MDs/PROVIDERS TO ORDER:  None today    Malnutrition Status:    Patient does not meet two of the established criteria necessary for diagnosing malnutrition    Recommendations already ordered by Registered Dietitian (RD):  Continue PRN snacks/supplements     Future/Additional Recommendations:  Additional diet education (carb counting) as needed closer to discharge  Monitor intakes and wt trends      EVALUATION OF THE PROGRESS TOWARD GOALS   Diet: High Consistent Carbohydrate (75g CHO per meal)  Snacks/supplements PRN  Intake: 100% of meals per flowsheet         NEW FINDINGS   Pt reports a good appetite and intakes. Stated she is consistently consuming 3 meals daily. She is ordering appropriate per health touch. Currently has an order for PRN snacks which she stated she has not had to utilize yet. Pt had no additional questions or concerns at this time. Has a 3hr gtt planned for tomorrow   Recommended wt gain during pregnancy with prepregnancy BMI of 40 kg/m^2, 11-20#. Pt has gained 17# so far during pregnancy.   03/18/22 105.3 kg (232 lb 3.2 oz)   03/11/22 107 kg (235 lb 12.8 oz)   03/08/22 107.5 kg (237 lb)   03/04/22 109.8 kg (242 lb)   08/31/21 97.7 kg (215 lb)-prepregnancy wt   01/30/20 80.7 kg (178 lb)   01/09/20 80.7 kg (178 lb)   12/31/19 82.6 kg (182 lb)   12/10/19 83.5 kg (184 lb)   11/25/19 85.3 kg (188 lb)   04/23/19 92.3 kg (203 lb 6.4 oz)   07/16/18 96.6 kg (213 lb)      MALNUTRITION  % Intake: No decreased intake noted  % Weight Loss: None noted  Subcutaneous Fat Loss: None observed  Muscle Loss: None observed  Fluid Accumulation/Edema: 1+ generalized   Malnutrition Diagnosis: Patient does not meet two of the established criteria necessary for diagnosing malnutrition    Previous Goals   Patient to consume % of nutritionally adequate meal trays TID, or the equivalent with  supplements/snacks.  Evaluation: Met    Previous Nutrition Diagnosis  Predicted food- and nutrition-related knowledge deficit related to GDM as evidenced by no previous GDM diet education and limited baseline knowledge     Evaluation: No longer applicable, nutrition diagnosis changed below    CURRENT NUTRITION DIAGNOSIS  Predicted inadequate nutrient intake related to LOS/menu fatigue     INTERVENTIONS  Implementation  Continue PRN snacks     Goals  Patient to consume % of nutritionally adequate meal trays TID, or the equivalent with supplements/snacks.    Monitoring/Evaluation  Progress toward goals will be monitored and evaluated per protocol.    Velvet Mcbride MS, RD, LDN  Unit Pager [phone]

## 2022-03-18 NOTE — PLAN OF CARE
Patient sleeping overnight. BP stable, afebrile. Denies any cramping, contractions, LOF or bleeding. Denies any H/A, vision changes, or epigastric pain. EFM applied, see flow sheets for interpretation. IV flushed, patent. Daily weight obtained. Pt showered and linens changed yesterday evening. Pt offers no complaints this morning. Report to Angie ZULETA RN, care relinquished.

## 2022-03-19 LAB
GESTATIONAL GTT 1 HR POST DOSE: 150 MG/DL (ref 60–179)
GESTATIONAL GTT 2 HR POST DOSE: 101 MG/DL (ref 60–154)
GESTATIONAL GTT 3 HR POST DOSE: 44 MG/DL (ref 60–139)
GLUCOSE BLDC GLUCOMTR-MCNC: 105 MG/DL (ref 70–99)
GLUCOSE BLDC GLUCOMTR-MCNC: 75 MG/DL (ref 70–99)
GLUCOSE P FAST SERPL-MCNC: 79 MG/DL (ref 60–94)

## 2022-03-19 PROCEDURE — 36415 COLL VENOUS BLD VENIPUNCTURE: CPT | Performed by: STUDENT IN AN ORGANIZED HEALTH CARE EDUCATION/TRAINING PROGRAM

## 2022-03-19 PROCEDURE — 59025 FETAL NON-STRESS TEST: CPT | Mod: 26 | Performed by: OBSTETRICS & GYNECOLOGY

## 2022-03-19 PROCEDURE — 99231 SBSQ HOSP IP/OBS SF/LOW 25: CPT | Mod: 25 | Performed by: OBSTETRICS & GYNECOLOGY

## 2022-03-19 PROCEDURE — 250N000013 HC RX MED GY IP 250 OP 250 PS 637: Performed by: STUDENT IN AN ORGANIZED HEALTH CARE EDUCATION/TRAINING PROGRAM

## 2022-03-19 PROCEDURE — 250N000009 HC RX 250: Performed by: STUDENT IN AN ORGANIZED HEALTH CARE EDUCATION/TRAINING PROGRAM

## 2022-03-19 PROCEDURE — 82947 ASSAY GLUCOSE BLOOD QUANT: CPT | Performed by: STUDENT IN AN ORGANIZED HEALTH CARE EDUCATION/TRAINING PROGRAM

## 2022-03-19 PROCEDURE — 120N000002 HC R&B MED SURG/OB UMMC

## 2022-03-19 PROCEDURE — 82951 GLUCOSE TOLERANCE TEST (GTT): CPT | Performed by: STUDENT IN AN ORGANIZED HEALTH CARE EDUCATION/TRAINING PROGRAM

## 2022-03-19 PROCEDURE — 82950 GLUCOSE TEST: CPT | Performed by: STUDENT IN AN ORGANIZED HEALTH CARE EDUCATION/TRAINING PROGRAM

## 2022-03-19 PROCEDURE — 250N000011 HC RX IP 250 OP 636: Performed by: STUDENT IN AN ORGANIZED HEALTH CARE EDUCATION/TRAINING PROGRAM

## 2022-03-19 RX ADMIN — PRENATAL VITAMINS-IRON FUMARATE 27 MG IRON-FOLIC ACID 0.8 MG TABLET 1 TABLET: at 10:14

## 2022-03-19 RX ADMIN — ENOXAPARIN SODIUM 40 MG: 40 INJECTION SUBCUTANEOUS at 19:58

## 2022-03-19 RX ADMIN — Medication 100 G: at 06:25

## 2022-03-19 RX ADMIN — ASPIRIN 81 MG: 81 TABLET, COATED ORAL at 10:14

## 2022-03-19 NOTE — PROGRESS NOTES
[facility]  Progress Note  DOS: 3/19/2022    Subjective:   Doing well this morning. Denies headache, changes in vision, chest pain, shortness of breath, RUQ pain or other systemic symptoms. Confirms active fetal movement. No new questions or concerns.    Objective:  Patient Vitals for the past 24 hrs:   BP Temp Temp src Pulse Resp Weight   03/19/22 0601 115/62 98.5  F (36.9  C) Oral -- 16 --   03/19/22 0600 -- -- -- -- -- 104.8 kg (231 lb 0.7 oz)   03/19/22 0110 122/70 -- -- -- 16 --   03/18/22 2100 132/69 98.4  F (36.9  C) Oral -- 16 --   03/18/22 1712 135/81 98.6  F (37  C) Oral 53 17 --   03/18/22 1430 131/76 98.6  F (37  C) Oral -- 18 --     Gen: Appears well, in NAD  CV:  Regular rate, well perfused  Abd:  Gravid, soft, non-tender  Extr:       Symmetric, no edema    FHT: Baseline 150, mod variability, + accels, no decels  [place]: 0 contractions     Labs:  Results for orders placed or performed during the hospital encounter of 03/07/22 (from the past 24 hour(s))   Glucose tolerance gest std 100 gm 3 hr    Narrative    The following orders were created for panel order Glucose tolerance gest std 100 gm 3 hr.  Procedure                               Abnormality         Status                     ---------                               -----------         ------                     Gestational Glucose Tole...[[id number]]  Normal              Final result               Gestational Glucose Tole...[[id number]]  Normal              Final result               Gestational Glucose Tole...[[id number]]  Normal              Final result               Gestational Glucose Tole...[[id number]]  Abnormal            Final result                 Please view results for these tests on the individual orders.   Gestational Glucose Tolerance Testing, Fasting   Result Value Ref Range    Gestational GTT Fasting 79 60 - 94 mg/dL   Gestational Glucose Tolerance Testing, 1 Hour   Result Value Ref Range    Gestational  GTT 1 Hr Post Dose 150 60 - 179 mg/dL   Gestational Glucose Tolerance Testing, 2 Hour   Result Value Ref Range    Gestational GTT 2 Hr Post Dose 101 60 - 154 mg/dL   Gestational Glucose Tolerance Testing, 3 Hour   Result Value Ref Range    Gestational GTT 3 Hr Post Dose 44 (LL) 60 - 139 mg/dL    Narrative    Blood glucose levels collected at fasting, 1 hour, 2 hours and 3 hours after ingestion of 100g glucose during pregnancy. The diagnosis of gestational diabetes mellitus is made when 2 or more of the glucose values listed below are met or exceeded:    Fasting             95 mg/dL  1 hour              180 mg/dL  2 hour              155 mg/dL  3 hour              140 mg/dL         Assessment and Plan:  Pao Pereira is a 41 year old  at 28w6d by 11w0d US, now HD#13 for preeclampsia with severe features. Pregnancy is otherwise complicated by severe FGR and asthma.     Pre-eclampsia  with Severe Features (BP) - stable  - No new signs or symptoms of preeclampsia.  - Continue serial BP monitoring. Alert with sustained SBP >160 and/or DBP >110 for acute treatment with antihypertensives.  Plan to avoid labetalol at this time due to maternal bradycardia.  - Continue HELLP labs every 72 hours. Of note, creatinine has increased since admission, but has not doubled from her baseline.   - Continue Serial weights and strict I/O  - Admission until anticipated delivery at 34wks d/t Pre-E with SF unless indicated earlier. Plan to reinitiate magnesium during induction/delivery and for 24h postpartum.    Persistent maternal Bradycardia  - Previous documentation from prior hospitalization and outside pregnancy of HR in 40-50s.  - Patient asymptomatic.   - EKG WNL other than bradycardic  - S/p cardiology, normal increase in HR with light activity, no further workup at this time - recommends reconsult if symptoms or other concerns identified    Severe FGR  Fetal well being  - Last growth ; EFW 631g, <1%, AC <1%  -  Continue twice weekly Dopplers and BPP, last 10/10 on 3/17/22  - Continue TID monitoring and prn. FHT reactive and reassuring for gestational age today.  - S/p Betamethasone (3/7-3/8). Rescue course as clinically indicated.  - S/p NICU consult    PNC  - Labs: Rh pos, Rubella immune. GBS negative (3/7).  Elevated GCT, passed GTT. Other prenatal labs wnl.  - Immunizations: S/p Tdap, COVID vaccines  - Genetics: low risk NIPT  - PPx: Lovenox   - Other: SW involved to assist with separation from children/long hospitalization                Seen and staffed with Dr. Edgard Bradley MD  Ob/Gyn Resident, PGY-3  2022 10:02 AM    MFM Attending Attestation  In summary, Ms. Pereira is a 41year old  at 29w1d admitted with preeclampsia with severe features. Her blood pressures have been normal to mild range not on medications, she is asymptomatic and her labs are normal. Her pregnancy is also complicated by severe FGR  (EFW <1st%) with normal dopplers and maternal asymptomatic bradycardia.  Both maternal and fetal status are stable and reassuring and will plan to continue inpatient admission until delivery at 34 weeks or sooner if clinically indicated.     BP (!) 141/79 (BP Location: Left arm, Patient Position: Sitting, Cuff Size: Adult Large)   Pulse 53   Temp 98.4  F (36.9  C) (Oral)   Resp 16   Wt 104.8 kg (231 lb 0.7 oz)   SpO2 98%   BMI 42.95 kg/m        FHT: Baseline 150s, moderate variability, +accels, no decels  TOCO: None  NST interpretation for today: reactive, reassuring and appropriate for GA     Time Spent on this Encounter   I saw this patient with the resident and agree with the resident's findings and plan of care as documented in the resident's note. I personally reviewed her vital signs, medications, labs, and fetal monitoring.     I spent 15 minutes face-to-face managing the care of Ms. Pereira.  Over 50% of my time was spent on the following:   - Counseling the patient and/or family  regarding: diagnosis, diagnostic results, prognosis and risks and benefits of treatment options  - Coordination of care with the: nurse and patient     Date of service (when I saw the patient): 3/19/2022     Lesley Rene MD  , OB/GYN  Maternal-Fetal Medicine  [phone] (Pager)

## 2022-03-19 NOTE — PLAN OF CARE
Goal Outcome Evaluation:      Patient's vital signs are stable. She had her 3 hr glucose test this morning and had hypoglycemia with the the 3 hour at 44. Patient was treated and maintained normal glucose. Denies headache, cramping, bleeding or leaking fluids. Was able to go outside and meet her family. FHR and uterine activity see flow sheet. Continue with plan of care.

## 2022-03-19 NOTE — PLAN OF CARE
VSS. Afebrile. Denies bleeding, cramping, LOF. No symptoms of pre-e. Adequate urine output. Slept well overnight. Morning EFM in process. Fasting BG taken per lab @ 0625, pt drank glucola immediately after; lab will redraw BG at 1 hr, 2 hr and 3 hr for GTT. Patient declines further needs. Continue with current cares.

## 2022-03-20 PROCEDURE — 59025 FETAL NON-STRESS TEST: CPT | Mod: 26 | Performed by: OBSTETRICS & GYNECOLOGY

## 2022-03-20 PROCEDURE — 250N000011 HC RX IP 250 OP 636: Performed by: STUDENT IN AN ORGANIZED HEALTH CARE EDUCATION/TRAINING PROGRAM

## 2022-03-20 PROCEDURE — 120N000002 HC R&B MED SURG/OB UMMC

## 2022-03-20 PROCEDURE — 250N000013 HC RX MED GY IP 250 OP 250 PS 637: Performed by: STUDENT IN AN ORGANIZED HEALTH CARE EDUCATION/TRAINING PROGRAM

## 2022-03-20 PROCEDURE — 99231 SBSQ HOSP IP/OBS SF/LOW 25: CPT | Mod: 25 | Performed by: OBSTETRICS & GYNECOLOGY

## 2022-03-20 RX ADMIN — ASPIRIN 81 MG: 81 TABLET, COATED ORAL at 08:50

## 2022-03-20 RX ADMIN — ENOXAPARIN SODIUM 40 MG: 40 INJECTION SUBCUTANEOUS at 19:36

## 2022-03-20 RX ADMIN — PRENATAL VITAMINS-IRON FUMARATE 27 MG IRON-FOLIC ACID 0.8 MG TABLET 1 TABLET: at 08:50

## 2022-03-20 NOTE — PLAN OF CARE
Goal Outcome Evaluation:      Patient's vital signs are stable. Patient is looking forward to see her partner and children today outside. Patient denies, headache, cramping, bleeding, or leaking fluids. FHR and uterine activity see flow sheet. Continue with plan of care.

## 2022-03-20 NOTE — PLAN OF CARE
VSS, one mildly elevated BP. Afebrile. Denies bleeding, cramping, LOF. Not experiencing pre-e symptoms. Adequate urine output. PIV in place, saline locked. Slept well overnight. EFM as charted. Continue with current cares.

## 2022-03-20 NOTE — PROGRESS NOTES
[facility]  Progress Note  DOS: 3/20/2022    Subjective:   Doing well this morning. Denies headache, changes in vision, chest pain, shortness of breath, RUQ pain or other systemic symptoms. Confirms active fetal movement. No new questions or concerns.    Objective:  Patient Vitals for the past 24 hrs:   BP Temp Temp src Resp Weight   22 1243 137/72 98.3  F (36.8  C) Oral 16 no documentation   22 0848 139/74 97.6  F (36.4  C) Oral 16 no documentation   22 0535 no documentation no documentation no documentation no documentation 106.4 kg (234 lb 9.1 oz)   22 0534 (Abnormal) 144/78 97.7  F (36.5  C) Oral no documentation no documentation   22 0132 109/65 no documentation no documentation no documentation no documentation   22 2114 122/68 98.6  F (37  C) Oral 16 no documentation   22 1700 126/70 97.9  F (36.6  C) Oral 16 no documentation     Gen: Appears well, in NAD  CV:  Regular rate, well perfused  Abd:  Gravid, soft, non-tender  Extr:       Symmetric, no edema    FHT: Baseline 155, mod variability, + accels, no decels  [place]: 0 contractions     Labs:   3/19/2022 10:19 3/19/2022 10:38   GLUCOSE BY METER POCT 75 105 (H)     Assessment and Plan:  Pao Pereira is a 41 year old  at 29w2d by 11w0d US, now HD#14 for preeclampsia with severe features. Pregnancy is otherwise complicated by severe FGR and asthma.     Pre-eclampsia  with Severe Features (BP) - stable  - No new signs or symptoms of preeclampsia.  - Continue serial BP monitoring. Alert with sustained SBP >160 and/or DBP >110 for acute treatment with antihypertensives.  Plan to avoid labetalol at this time due to maternal bradycardia.  - Continue HELLP labs every 72 hours. Of note, creatinine has increased since admission, but has not doubled from her baseline.   - Continue Serial weights and strict I/O  - Admission until anticipated delivery at 34wks d/t Pre-E with SF unless  indicated earlier. Plan to reinitiate magnesium during induction/delivery and for 24h postpartum.    Persistent maternal Bradycardia  - Previous documentation from prior hospitalization and outside pregnancy of HR in 40-50s.  - Patient asymptomatic.   - EKG WNL other than bradycardic  - S/p cardiology, normal increase in HR with light activity, no further workup at this time - recommends reconsult if symptoms or other concerns identified    Severe FGR  Fetal well being  - Last growth ; EFW 631g, <1%, AC <1%  - Continue twice weekly Dopplers and BPP, last 10/10 on 3/17/22  - Continue TID monitoring and prn. FHT reactive and reassuring for gestational age today.  - S/p Betamethasone (3/7-3/8). Rescue course as clinically indicated.  - S/p NICU consult    PNC  - Labs: Rh pos, Rubella immune. GBS negative (3/7).  Elevated GCT, passed GTT. Other prenatal labs wnl.  - Immunizations: S/p Tdap, COVID vaccines  - Genetics: low risk NIPT  - PPx: Lovenox   - Other: SW involved to assist with separation from children/long hospitalization.                Seen and staffed with Dr. Edgard Romero MD  Ob/Gyn Resident, PGY-3  2022 2:43 PM     MFM Attending Attestation  In summary, Ms. Pereira is a 41year old  at 29w2d admitted with preeclampsia with severe features. Her blood pressures have been normal to mild range not on medications, she is asymptomatic and her labs are normal. Her pregnancy is also complicated by severe FGR  (EFW <1st%) with normal dopplers and maternal asymptomatic bradycardia.  Both maternal and fetal status are stable and reassuring and will plan to continue inpatient admission until delivery at 34 weeks or sooner if clinically indicated.     /72 (BP Location: Left arm, Patient Position: Semi-Gibbs's, Cuff Size: Adult Large)   Pulse 51   Temp 98.3  F (36.8  C) (Oral)   Resp 16   Wt 106.4 kg (234 lb 9.1 oz)   SpO2 98%   BMI 43.60 kg/m        FHT: Baseline 155,  moderate variability, +accels, no decels  TOCO: None  NST interpretation for today: reactive, reassuring and appropriate for GA    Time Spent on this Encounter   I saw this patient with the resident and agree with the resident's findings and plan of care as documented in the resident's note. I personally reviewed her vital signs, medications, labs, and fetal monitoring.     I spent 15 minutes face-to-face managing the care of Ms. Pereira.  Over 50% of my time was spent on the following:   - Counseling the patient and/or family regarding: diagnosis, diagnostic results, prognosis and risks and benefits of treatment options  - Coordination of care with the: nurse and patient     Date of service (when I saw the patient): 3/20/2022     Lesley Rene MD  , OB/GYN  Maternal-Fetal Medicine  [phone] (Pager)

## 2022-03-21 ENCOUNTER — APPOINTMENT (OUTPATIENT)
Dept: ULTRASOUND IMAGING | Facility: CLINIC | Age: 42
End: 2022-03-21
Attending: STUDENT IN AN ORGANIZED HEALTH CARE EDUCATION/TRAINING PROGRAM
Payer: COMMERCIAL

## 2022-03-21 LAB
ALT SERPL W P-5'-P-CCNC: 45 U/L (ref 0–50)
AST SERPL W P-5'-P-CCNC: 34 U/L (ref 0–45)
CREAT SERPL-MCNC: 0.63 MG/DL (ref 0.52–1.04)
ERYTHROCYTE [DISTWIDTH] IN BLOOD BY AUTOMATED COUNT: 13.6 % (ref 10–15)
GFR SERPL CREATININE-BSD FRML MDRD: >90 ML/MIN/1.73M2
HCT VFR BLD AUTO: 37.7 % (ref 35–47)
HGB BLD-MCNC: 13.2 G/DL (ref 11.7–15.7)
MCH RBC QN AUTO: 31.7 PG (ref 26.5–33)
MCHC RBC AUTO-ENTMCNC: 35 G/DL (ref 31.5–36.5)
MCV RBC AUTO: 90 FL (ref 78–100)
PLATELET # BLD AUTO: 186 10E3/UL (ref 150–450)
RBC # BLD AUTO: 4.17 10E6/UL (ref 3.8–5.2)
SARS-COV-2 RNA RESP QL NAA+PROBE: NEGATIVE
WBC # BLD AUTO: 9.2 10E3/UL (ref 4–11)

## 2022-03-21 PROCEDURE — 76816 OB US FOLLOW-UP PER FETUS: CPT | Mod: 26 | Performed by: OBSTETRICS & GYNECOLOGY

## 2022-03-21 PROCEDURE — 76819 FETAL BIOPHYS PROFIL W/O NST: CPT

## 2022-03-21 PROCEDURE — 87635 SARS-COV-2 COVID-19 AMP PRB: CPT | Performed by: OBSTETRICS & GYNECOLOGY

## 2022-03-21 PROCEDURE — 99232 SBSQ HOSP IP/OBS MODERATE 35: CPT | Mod: 25 | Performed by: OBSTETRICS & GYNECOLOGY

## 2022-03-21 PROCEDURE — 250N000011 HC RX IP 250 OP 636: Performed by: STUDENT IN AN ORGANIZED HEALTH CARE EDUCATION/TRAINING PROGRAM

## 2022-03-21 PROCEDURE — 84460 ALANINE AMINO (ALT) (SGPT): CPT | Performed by: STUDENT IN AN ORGANIZED HEALTH CARE EDUCATION/TRAINING PROGRAM

## 2022-03-21 PROCEDURE — 76820 UMBILICAL ARTERY ECHO: CPT | Mod: 26 | Performed by: OBSTETRICS & GYNECOLOGY

## 2022-03-21 PROCEDURE — 120N000002 HC R&B MED SURG/OB UMMC

## 2022-03-21 PROCEDURE — 82565 ASSAY OF CREATININE: CPT | Performed by: STUDENT IN AN ORGANIZED HEALTH CARE EDUCATION/TRAINING PROGRAM

## 2022-03-21 PROCEDURE — 84450 TRANSFERASE (AST) (SGOT): CPT | Performed by: STUDENT IN AN ORGANIZED HEALTH CARE EDUCATION/TRAINING PROGRAM

## 2022-03-21 PROCEDURE — 76819 FETAL BIOPHYS PROFIL W/O NST: CPT | Mod: 26 | Performed by: OBSTETRICS & GYNECOLOGY

## 2022-03-21 PROCEDURE — 36415 COLL VENOUS BLD VENIPUNCTURE: CPT | Performed by: STUDENT IN AN ORGANIZED HEALTH CARE EDUCATION/TRAINING PROGRAM

## 2022-03-21 PROCEDURE — 250N000013 HC RX MED GY IP 250 OP 250 PS 637: Performed by: STUDENT IN AN ORGANIZED HEALTH CARE EDUCATION/TRAINING PROGRAM

## 2022-03-21 PROCEDURE — 85014 HEMATOCRIT: CPT | Performed by: STUDENT IN AN ORGANIZED HEALTH CARE EDUCATION/TRAINING PROGRAM

## 2022-03-21 RX ADMIN — PRENATAL VITAMINS-IRON FUMARATE 27 MG IRON-FOLIC ACID 0.8 MG TABLET 1 TABLET: at 08:23

## 2022-03-21 RX ADMIN — ASPIRIN 81 MG: 81 TABLET, COATED ORAL at 08:23

## 2022-03-21 RX ADMIN — ENOXAPARIN SODIUM 40 MG: 40 INJECTION SUBCUTANEOUS at 18:29

## 2022-03-21 NOTE — PROGRESS NOTES
[facility]  Progress Note  DOS: 3/21/2022    Subjective:   Doing well this afternoon. Denies headache, changes in vision, chest pain, shortness of breath, RUQ pain or other systemic symptoms. Confirms active fetal movement. No new questions or concerns.    Objective:  BP (!) 143/84   Pulse 51   Temp 98.3  F (36.8  C) (Oral)   Resp 18   Wt 105.8 kg (233 lb 4.8 oz)   SpO2 98%   BMI 43.37 kg/m      Gen: Appears well, in NAD  CV:  Regular rate, well perfused  Abd:  Gravid, soft, non-tender  Extr:       Symmetric, no edema    FHT: Baseline 150s, mod variability, + accels, no decels  [place]: 0 contractions     Labs:  Creatinine pend  AST- pend  ALT- pend  CBC- WNL  Covid- neg    Assessment and Plan:  Pao Pereira is a 41 year old  at 29w3d by 11w0d US, now HD#15 for preeclampsia with severe features. Pregnancy is otherwise complicated by severe FGR and asthma.     Pre-eclampsia  with Severe Features (BP) - stable  - No new signs or symptoms of preeclampsia.  - Continue serial BP monitoring. Alert with sustained SBP >160 and/or DBP >110 for acute treatment with antihypertensives.  Plan to avoid labetalol at this time due to maternal bradycardia.  - Continue HELLP labs every 72 hours. Of note, creatinine has increased since admission, but has not doubled from her baseline.   - Continue Serial weights and strict I/O  - Admission until anticipated delivery at 34wks d/t Pre-E with SF unless indicated earlier. Plan to reinitiate magnesium during induction/delivery and for 24h postpartum.    Persistent maternal Bradycardia  - Previous documentation from prior hospitalization and outside pregnancy of HR in 40-50s.  - Patient asymptomatic.   - EKG WNL other than bradycardic  - S/p cardiology, normal increase in HR with light activity, no further workup at this time - recommends reconsult if symptoms or other concerns identified    Severe FGR  Fetal well being  - Last growth 3/21-  looks reassuring, official report pending  - Continue twice weekly Dopplers and BPP, last 10/10 on 3/17/22  - Continue TID monitoring and prn. FHT reactive and reassuring for gestational age today.  - S/p Betamethasone (3/7-3/8). Rescue course as clinically indicated.  - S/p NICU consult    PNC  - Labs: Rh pos, Rubella immune. GBS negative (3/7).  Elevated GCT, passed GTT. Other prenatal labs wnl.  - Immunizations: S/p Tdap, COVID vaccines  - Genetics: low risk NIPT  - PPx: Lovenox   - Other: SW involved to assist with separation from children/long hospitalization.                Seen and staffed with Dr. Vivian Frank, MS4  12:38 PM  March 21, 2022

## 2022-03-21 NOTE — PLAN OF CARE
Goal Outcome Evaluation:    Plan of Care Reviewed With: patient     Overall Patient Progress: no change    BP's mild range. Pt denies HA, RUQ pain or visual disturbances. 2+ swelling in BLE. 1+ reflexes, no clonus present. Pt denies contractions, pain, or LOF. EFM AGA - see flowsheet, periods of intermittent tracing and monitoring extended, no contractions.   Extended dwelling IV in place and patent. Continue plan of care.

## 2022-03-21 NOTE — PLAN OF CARE
Pt able to rest well overnight. Pt had a BP of 140/63 overnight, otherwise VSS. Afebrile. Pt denies LOF, bleeding or cramping this shift. Pt denies HA, blurry vision, SOB and RUQ pain. See flowsheet for FHR and uterine activity. Will continue to monitor pt and notify provider of any change in status.

## 2022-03-22 PROCEDURE — 250N000011 HC RX IP 250 OP 636: Performed by: STUDENT IN AN ORGANIZED HEALTH CARE EDUCATION/TRAINING PROGRAM

## 2022-03-22 PROCEDURE — 250N000013 HC RX MED GY IP 250 OP 250 PS 637: Performed by: STUDENT IN AN ORGANIZED HEALTH CARE EDUCATION/TRAINING PROGRAM

## 2022-03-22 PROCEDURE — 99232 SBSQ HOSP IP/OBS MODERATE 35: CPT | Mod: GC | Performed by: OBSTETRICS & GYNECOLOGY

## 2022-03-22 PROCEDURE — 120N000002 HC R&B MED SURG/OB UMMC

## 2022-03-22 RX ADMIN — PRENATAL VITAMINS-IRON FUMARATE 27 MG IRON-FOLIC ACID 0.8 MG TABLET 1 TABLET: at 09:00

## 2022-03-22 RX ADMIN — ASPIRIN 81 MG: 81 TABLET, COATED ORAL at 09:00

## 2022-03-22 RX ADMIN — ENOXAPARIN SODIUM 40 MG: 40 INJECTION SUBCUTANEOUS at 19:43

## 2022-03-22 NOTE — PROGRESS NOTES
[facility]  Progress Note  DOS: 3/21/2022     Subjective:   Doing well this afternoon. Denies headache, changes in vision, chest pain, shortness of breath, RUQ pain or other systemic symptoms. Confirms active fetal movement. No new questions or concerns.     Objective:  BP (!) 143/84   Pulse 51   Temp 98.3  F (36.8  C) (Oral)   Resp 18   Wt 105.8 kg (233 lb 4.8 oz)   SpO2 98%   BMI 43.37 kg/m       Gen:      Appears well, in NAD  CV:         Regular rate, well perfused  Abd:       Gravid, soft, non-tender  Extr:       Symmetric, no edema     FHT:       Baseline 150s, mod variability, + accels, no decels  [place]:     0 contractions      Labs:  Creatinine pend  AST- pend  ALT- pend  CBC- WNL  Covid- neg     Assessment and Plan:  Pao Pereira is a 41 year old  at 29w3d by 11w0d US, now HD#15 for preeclampsia with severe features. Pregnancy is otherwise complicated by severe FGR and asthma.      Pre-eclampsia  with Severe Features (BP) - stable  - No new signs or symptoms of preeclampsia.  - Continue serial BP monitoring. Alert with sustained SBP >160 and/or DBP >110 for acute treatment with antihypertensives.  Plan to avoid labetalol at this time due to maternal bradycardia.  - Continue HELLP labs every 72 hours. Of note, creatinine has increased since admission, but has not doubled from her baseline.   - Continue Serial weights and strict I/O  - Admission until anticipated delivery at 34wks d/t Pre-E with SF unless indicated earlier. Plan to reinitiate magnesium during induction/delivery and for 24h postpartum.     Persistent maternal Bradycardia  - Previous documentation from prior hospitalization and outside pregnancy of HR in 40-50s.  - Patient asymptomatic.   - EKG WNL other than bradycardic  - S/p cardiology, normal increase in HR with light activity, no further workup at this time - recommends reconsult if symptoms or other concerns identified     Severe  FGR  Fetal well being  - Last growth 3/21- looks reassuring, official report pending  - Continue twice weekly Dopplers and BPP, last 10/10 on 3/17/22  - Continue TID monitoring and prn. FHT reactive and reassuring for gestational age today.  - S/p Betamethasone (3/7-3/8). Rescue course as clinically indicated.  - S/p NICU consult     PNC  - Labs: Rh pos, Rubella immune. GBS negative (3/7).  Elevated GCT, passed GTT. Other prenatal labs wnl.  - Immunizations: S/p Tdap, COVID vaccines  - Genetics: low risk NIPT  - PPx: Lovenox   - Other: SW involved to assist with separation from children/long hospitalization.                Seen and staffed with Dr. Vivian Frank, MS4  12:38 PM  March 21, 2022    -----  I was present with the student who participated in the service and in the documentation of the note. I have verified the history and personally performed the physical exam and medical decisionmaking. I agree with the assessment and plan of care as documented in the note.    Cheyenne Nguyen MD  Maternal Fetal Medicine Fellow  3/22/2022 2:06 PM

## 2022-03-22 NOTE — PLAN OF CARE
Patient's vitals stable and afebrile. Patient has not had any severe range BP during the shift. The patient denies headache, vision changes, upper gastric pain, contractions, vaginal bleeding, LOF, and decrease in fetal movement. Strict I&Os documented. IV flushed. Patient got tearful today because she was feeling nervous about the feeling of her uterus tightening 3 times over the span of 4 hours. Nurse proved emotional support and informed patient to call if the tightening became more frequent, painful, and if she wanted to be monitored. During the afternoon monitoring there were no contractions and uterus palpated soft, will continue to monitor uterine activity. Fetal heart monitor documented in flowsheet.

## 2022-03-22 NOTE — PROVIDER NOTIFICATION
03/22/22 1842   Provider Notification   Provider Name/Title Dr. Ames    Method of Notification Electronic Page   Notification Reason Status Update   FYI, Patient BP was 155/72. She denies headache, vision changes, and upper gastric pain. Will continue to monitor.

## 2022-03-22 NOTE — PLAN OF CARE
VSS. BPs WNL. Pt is afebrile. No signs of loss of fluid, bleeding or contractions. Denies vision changes and epigastric pain.  Pt slept comfortably overnight. See flow sheet for FHR and contraction pattern. Continue with plan of care.

## 2022-03-22 NOTE — PROGRESS NOTES
"Acupuncture Clinical Internship Intake and Treatment Documentation   [facility]    Date:  3/22/2022  Patient Name:  Pao Pereira   YOB: 1980     Repeat Patient:  yes  Has patient had acupoint/acupressure treatment before:  yes    Signed consent placed in the medical record:  yes  Patient/Family verbalizes understanding of risks and benefits:  yes  Required information provided to patient:  yes    Diagnosis:  Encounter for triage in pregnant patient [Z36.89]  Preeclampsia [O14.90]    Patient condition and treatment:  29 weeks pregnant  Reason for Intervention Today/Chief Complaint:  Pressure in temporal region and behind neck, \"wet ears\"    Isolation:  No  Type:  None    PRE-SCORE:  mild      Medications  No current outpatient medications on file.     Current Facility-Administered Medications   Medication    acetaminophen (TYLENOL) tablet 650 mg    aspirin EC tablet 81 mg    glucose gel 15-30 g    Or    dextrose 50 % injection 25-50 mL    Or    glucagon kit 1 mg    enoxaparin ANTICOAGULANT (LOVENOX) injection 40 mg    hydrALAZINE (APRESOLINE) injection 10 mg    labetalol (NORMODYNE/TRANDATE) algorithm-medication instruction    labetalol (NORMODYNE/TRANDATE) injection 20-80 mg    lactated ringers BOLUS 1,000 mL    Or    lactated ringers BOLUS 500 mL    LORazepam (ATIVAN) injection 2 mg    magnesium sulfate 2 g in water intermittent infusion    magnesium sulfate 4 g in 100 mL sterile water (premade)    magnesium sulfate injection 10 g    metoclopramide (REGLAN) injection 10 mg    Or    metoclopramide (REGLAN) tablet 10 mg    ondansetron (ZOFRAN-ODT) ODT tab 4 mg    Or    ondansetron (ZOFRAN) injection 4 mg    prenatal multivitamin w/iron per tablet 1 tablet    prochlorperazine (COMPAZINE) injection 10 mg    Or    prochlorperazine (COMPAZINE) tablet 10 mg    Or    prochlorperazine (COMPAZINE) suppository 25 mg    sodium chloride (PF) 0.9% PF flush 3 mL       Pre-Treatment " "Assessment  Chief Complaint/ Reason for Intervention Today:  Pressure in temporal region and behind neck, wet ears  Chief Complaint Pre-Score:  mild   Describe:  Pt reports 2/10 pressure in temporal region and behind neck; ears feel plugged upon waking and like she needs to use a q-tip  Pain Location:  Temporal region of head and back of neck, bilateral ears  Pre Session Pain:  Mild  Pre Session Anxiety:  Moderate  Pre Session Nausea:  None    10 Traditional Chinese Medicine Assessment Questions  - Cold/ Heat:  Alternates hot and cold  - Sweat:  Night sweating around neck  - Headaches/Body aches:  No headaches currently  - Chest/Abdomen:  No issues  - Digestion:  heartburn  - Bowel Movement/Urination:  1-2BM/day, looser  - Hearing/Vision:  Had brief ringing in ears 2 days ago  - Sleep (prior to hospital):  Easy to fall asleep but wakes up 1-2 times during night and has difficulty falling asleep again; vivid dreams, gets about 7 hours of sleep and wakes up feeling tired  - Energy:  8/10 fatigue, needs naps at times  - Emotions:  Feels \"more emotional than normal\", anxious  - Ob Gyn:  Currently pregnant  - Miscellaneous:  n/a    Traditional Chinese Medicine Assessment  - TONGUE:  Puffy, pale lavender, moist, teeth marks, crack in heart region  - PULSE:  Left: wiry; right: slippery  - OBSERVATIONS:  n/a     Traditional Chinese Medicine Diagnosis  - BRANCH:  Rebellious stomach heat  - ROOT:  Blood and yin deficiency     Traditional Chinese Medicine Treatment  - ACUPUNCTURE:  Sp 3, St 36, St 40, St 44, Ki 9, rey rajput, SI 19, GB 20   Left: ling gu  - AURICULAR: left soto men and point zero  - NEEDLE COUNT: In: 19   Out: 19    Time In: 1130     Magnet informed consent signed and given:  no    Post Treatment Assessment  Chief complaint post score:  better  Post Session Observation:  Pt slept throughout treatment and reports no pressure in head and no \"wetness\" in ears  Patient/Family Education:  Drink lots of water  Verbal " information provided:  yes  Written information provided:  no  All questions answered at time of treatment:  yes    Treatment/Procedure(s) performed by:  Maribell Zarate, Acupuncture Intern    Date: 3/22/2022     I attest that this acupuncture treatment was done under my supervision and this note is complete and true.     Supervising acupuncturist:  Shay Gleason LAc [facility] license #: [id number]  P: [phone]

## 2022-03-22 NOTE — PROGRESS NOTES
[facility]  Progress Note  DOS: 3/22/2022    Subjective:   Doing well this morning. Denies headache, changes in vision, chest pain, shortness of breath, RUQ pain or other systemic symptoms. She was explained of the new findings of increased resistance on UAD- all questions and concerns addressed.     Objective:  /82   Pulse 51   Temp 98.5  F (36.9  C) (Oral)   Resp 16   Wt 105.6 kg (232 lb 11.2 oz)   SpO2 98%   BMI 43.26 kg/m      Gen: Appears well, in NAD  CV:  Regular rate, well perfused  Abd:  Gravid, soft, non-tender  Extr:       Symmetric, no edema    FHT: Baseline 150, mod variability, 1 continuous accel, 1 discontinuous possible accel, 1 spontaneous decel @ 0648 lasting 4.5 mins with [place] to ~115  [place]: 0 contractions     Recent Labs:  Results for PAO PEREIRA (MRN [id number]) as of 3/22/2022 08:59  Creatinine 0.63   GFR Estimate >90   ALT 45   AST 34   WBC 9.2   Hemoglobin 13.2   Hematocrit 37.7   Platelet Count 186   RBC Count 4.17   MCV 90   MCH 31.7   MCHC 35.0   RDW 13.6       Assessment and Plan:  Pao Pereira is a 41 year old  at 29w4d by 11w0d US, now HD#16 for preeclampsia with severe features. Pregnancy is otherwise complicated by severe FGR and asthma.     Pre-eclampsia  with Severe Features (BP) - stable  - No new signs or symptoms of preeclampsia.  - Continue serial BP monitoring. Alert with sustained SBP >160 and/or DBP >110 for acute treatment with antihypertensives.  Plan to avoid labetalol at this time due to maternal bradycardia.  - Continue HELLP labs every 72 hours. Of note, creatinine has increased since admission, but has not doubled from her baseline.   - Continue Serial weights and strict I/O  - Admission until anticipated delivery at 34wks d/t Pre-E with SF unless indicated earlier. Plan to reinitiate magnesium during induction/delivery and for 24h postpartum.    Intermittent maternal Bradycardia  - Previous documentation  from prior hospitalization and outside pregnancy of HR in 40-50s.  - Patient asymptomatic.   - EKG WNL other than bradycardic  - S/p cardiology, normal increase in HR with light activity, no further workup at this time - recommends reconsult if symptoms or other concerns identified    Severe FGR  Fetal well being  - Last growth 3/21- sFGR <1%, VIKAS nl, UAD with increased placental resistance with continuous forward flow  - Continue twice weekly Dopplers and BPP, last 10/10 on 3/21/22  - Continue TID monitoring and prn. Spontaneous decel this morning, will put her back on monitor and reassess  - S/p Betamethasone (3/7-3/8). Rescue course as clinically indicated.  - S/p NICU consult    PNC  - Labs: Rh pos, Rubella immune. GBS negative (3/7).  Elevated GCT, passed GTT. Other prenatal labs wnl.  - Immunizations: S/p Tdap, COVID vaccines  - Genetics: low risk NIPT  - PPx: Lovenox   - Other: SW involved to assist with separation from children/long hospitalization.                Seen and staffed with Dr. Vivian Frank, MS4  6:24 AM  March 22, 2022    Resident/Fellow Attestation   I, Senia Ames, was present with the medical student who participated in the service and in the documentation of the note.  I have verified the history and personally performed the physical exam and medical decision making.  I agree with the assessment and plan of care as documented in the note.  I have reviewed the note and made changes as necessary.    Senia Ames MD

## 2022-03-22 NOTE — PROGRESS NOTES
This writer stopped by Pao's room for a supportive check-in.  Pao reports she is doing well and is very grateful for her providers and support resources.  Melvi from [employer] has supported siblings which Pao has stated has been so wonderful.  Pao has no concerns at this time.    This writer will be away from the office from March 23-March 29 returning March 30.     [facility] Team can be reached via pager at [phone] Mon-Fri daytime or     After Hours/Weekend at [phone]     Ivory LYNW, MSW, LICSW  Maternal Child Health

## 2022-03-23 LAB
ALT SERPL W P-5'-P-CCNC: 64 U/L (ref 0–50)
AST SERPL W P-5'-P-CCNC: 46 U/L (ref 0–45)
CREAT SERPL-MCNC: 0.56 MG/DL (ref 0.52–1.04)
ERYTHROCYTE [DISTWIDTH] IN BLOOD BY AUTOMATED COUNT: 13.6 % (ref 10–15)
GFR SERPL CREATININE-BSD FRML MDRD: >90 ML/MIN/1.73M2
HCT VFR BLD AUTO: 36 % (ref 35–47)
HGB BLD-MCNC: 12.4 G/DL (ref 11.7–15.7)
MCH RBC QN AUTO: 31.5 PG (ref 26.5–33)
MCHC RBC AUTO-ENTMCNC: 34.4 G/DL (ref 31.5–36.5)
MCV RBC AUTO: 91 FL (ref 78–100)
PLATELET # BLD AUTO: 129 10E3/UL (ref 150–450)
RBC # BLD AUTO: 3.94 10E6/UL (ref 3.8–5.2)
WBC # BLD AUTO: 7.3 10E3/UL (ref 4–11)

## 2022-03-23 PROCEDURE — 120N000002 HC R&B MED SURG/OB UMMC

## 2022-03-23 PROCEDURE — 99232 SBSQ HOSP IP/OBS MODERATE 35: CPT | Mod: GC | Performed by: OBSTETRICS & GYNECOLOGY

## 2022-03-23 PROCEDURE — 82565 ASSAY OF CREATININE: CPT | Performed by: STUDENT IN AN ORGANIZED HEALTH CARE EDUCATION/TRAINING PROGRAM

## 2022-03-23 PROCEDURE — 250N000011 HC RX IP 250 OP 636: Performed by: STUDENT IN AN ORGANIZED HEALTH CARE EDUCATION/TRAINING PROGRAM

## 2022-03-23 PROCEDURE — 85027 COMPLETE CBC AUTOMATED: CPT | Performed by: STUDENT IN AN ORGANIZED HEALTH CARE EDUCATION/TRAINING PROGRAM

## 2022-03-23 PROCEDURE — 84460 ALANINE AMINO (ALT) (SGPT): CPT | Performed by: STUDENT IN AN ORGANIZED HEALTH CARE EDUCATION/TRAINING PROGRAM

## 2022-03-23 PROCEDURE — 250N000013 HC RX MED GY IP 250 OP 250 PS 637: Performed by: STUDENT IN AN ORGANIZED HEALTH CARE EDUCATION/TRAINING PROGRAM

## 2022-03-23 PROCEDURE — 84450 TRANSFERASE (AST) (SGOT): CPT | Performed by: STUDENT IN AN ORGANIZED HEALTH CARE EDUCATION/TRAINING PROGRAM

## 2022-03-23 PROCEDURE — 36415 COLL VENOUS BLD VENIPUNCTURE: CPT | Performed by: STUDENT IN AN ORGANIZED HEALTH CARE EDUCATION/TRAINING PROGRAM

## 2022-03-23 RX ADMIN — ASPIRIN 81 MG: 81 TABLET, COATED ORAL at 07:51

## 2022-03-23 RX ADMIN — ENOXAPARIN SODIUM 40 MG: 40 INJECTION SUBCUTANEOUS at 19:59

## 2022-03-23 RX ADMIN — PRENATAL VITAMINS-IRON FUMARATE 27 MG IRON-FOLIC ACID 0.8 MG TABLET 1 TABLET: at 07:50

## 2022-03-23 NOTE — PROVIDER NOTIFICATION
03/22/22 2030   Provider Notification   Provider Name/Title Dr. Dempsey   Method of Notification In Department   Notification Reason Decels   Comments   Comments Provider reviewed EFM strip with RN, RN to continue to monitor for 1 hour after decel, if category I, may remove monitor

## 2022-03-23 NOTE — PROVIDER NOTIFICATION
"   03/22/22 2000   Provider Notification   Provider Name/Title Dr. Dempsey   Method of Notification Phone     RN notified provider of pt feeling chest feeling \"heavy\" pt reports feeling full and \"cheat feels heavy or a pressure in chest\" Provider to come assess pt  "

## 2022-03-23 NOTE — PROGRESS NOTES
[facility]  Progress Note  DOS: 3/23/2022    Subjective:   Doing well this morning. Denies headache, changes in vision, chest pain, shortness of breath, RUQ pain or other systemic symptoms. Episode of weird chest tightness from last night resolved. Had more yao hawk contractions than usual yesterday but not today    Objective:  Vitals:    22 2205 22 0140 22 0552   BP:  138/84 124/75 137/77   BP Location:       Pulse:       Resp:  18 16 16   Temp:   98.7  F (37.1  C) 99  F (37.2  C)   TempSrc:   Oral Oral   SpO2: 98%      Weight:    105.5 kg (232 lb 8 oz)       Gen: Appears well, in NAD  CV:  Regular rate, well perfused  Resp: Normal inspiratory effort  Extr:        Symmetric, no edema    FHT: Baseline 150, mod variability, + accels, intermittent small variable decels  [place]: 0 contractions     Recent Labs:  Results for PAO PEREIRA (MRN [id number]) as of 3/23/2022 08:38  Creatinine 0.56   GFR Estimate >90   ALT 64 (H)   AST 46 (H)   WBC 7.3   Hemoglobin 12.4   Hematocrit 36.0   Platelet Count 129 (L)   RBC Count 3.94   MCV 91   MCH 31.5   MCHC 34.4   RDW 13.6       Assessment and Plan:  Pao Pereira is a 41 year old  at 29w5d by 11w0d US, now HD#17 for preeclampsia with severe features. Pregnancy is otherwise complicated by severe FGR and asthma.     Pre-eclampsia  with Severe Features (BP) - stable  - No new signs or symptoms of preeclampsia.  - Continue serial BP monitoring. Alert with sustained SBP >160 and/or DBP >110 for acute treatment with antihypertensives.  Plan to avoid labetalol at this time due to maternal bradycardia.  - HELLP today notable for ALT 64, AST 46, and platelets of 129. Cr stable. Will repeat HELLP tomorrow  - Continue Serial weights and strict I/O  - Admission until anticipated delivery at 34wks d/t Pre-E with SF unless indicated earlier. Plan to reinitiate magnesium during induction/delivery and for 24h  postpartum.    Intermittent maternal Bradycardia  - Previous documentation from prior hospitalization and outside pregnancy of HR in 40-50s.  - Patient asymptomatic.   - EKG WNL other than bradycardic  - S/p cardiology, normal increase in HR with light activity, no further workup at this time - recommends reconsult if symptoms or other concerns identified    Severe FGR  Fetal well being  - Last growth 3/21- sFGR <1%, VIKAS nl, UAD with increased placental resistance with continuous forward flow  - Continue twice weekly Dopplers and BPP, last 10/10 on 3/21/22  - Continue TID monitoring and prn.   - S/p Betamethasone (3/7-3/8). Rescue course as clinically indicated.  - S/p NICU consult    PNC  - Labs: Rh pos, Rubella immune. GBS negative (3/7).  Elevated GCT, passed GTT. Other prenatal labs wnl.  - Immunizations: S/p Tdap, COVID vaccines  - Genetics: low risk NIPT  - PPx: Lovenox   - Other: SW involved to assist with separation from children/long hospitalization.                Seen and staffed with Dr. Vivian Ames MD  Obstetrics & Gynecology PGY-3  8:36 AM 3/23/2022

## 2022-03-23 NOTE — PLAN OF CARE
VSS, mild range blood pressures this shift. Pt denies HA, visual changes, RUQ pain, SOB or chest pain. Pt did report feeling of chest heaviness and pressure, Dr. Dempsey at bedside to assess, per provider RN continue to monitor, pt to report to RN if feeling of chest pressure is worsening. Pt denies bleeding or LOF. Pt reports tightening of abdomen earlier today, but not painful. Pt reports active FM. EFM at charted, see flowsheets. Continue current plan of care.

## 2022-03-23 NOTE — PLAN OF CARE
Goal Outcome Evaluation:    Plan of Care Reviewed With: patient     Overall Patient Progress: no change    VSS. Pt denies HA, RUQ pain or visual disturbances. Reflexes 1+, no clonus present. Trace edema in BLE. Pt denies pain, leakage of fluid or contractions. EFM AGA - see flowsheet. Pt had 1x variable, monitoring extended. No contractions noted. Extended dwelling PIV saline locked in right lower extremity. Family life brought activity for pt's children to do at home and pt very appreciate. Continue plan of care.

## 2022-03-23 NOTE — PLAN OF CARE
VSS. BPs WNL. Pt is afebrile. No signs of loss of fluid, bleeding or contractions. Denies vision changes and epigastric pain.  Pt slept comfortably overnight, offers no complaints. See flow sheet for FHR and contraction pattern. Continue with plan of care.

## 2022-03-23 NOTE — PROGRESS NOTES
Brief Progress Note    At bedside to assess patient after reporting mild chest tightness. Tightness started yesterday evening after eating, described as a feeling of being more full after meals that usual that continued into today. Tightness is still present in chest radiating to back. Currently 2/10 in discomfort, not a major concern but she felt obligated to report this. Denies acid reflux, SOB, other CP, HA, vision changes, RUQ pain, increase in swelling. Has been more congested since her last COVID test last week. Has infrequent asthma, uses an inhaler only when she has a cold. Has also noticed more yao hixx contractions today.     Vitals:    22 1345 22 1735 22 1942 22   BP: 136/74 (!) 155/72 (!) 140/80    BP Location:       Pulse:       Resp:  16    Temp: 98.2  F (36.8  C) 97.8  F (36.6  C) 98.3  F (36.8  C)    TempSrc: Oral Oral Oral    SpO2:    98%   Weight:         CV: RRR  Pulm: CTAB  Abd: Nontender  Extr: 1+ edema b/l     FHT:   150 baseline, mod laura, +15x15 accels, spontaneous decel audibly heard in 80s lasting 2 min with spontaneous recovery to baseline  [place]: No ctx     Pao Pereira is a 41 year old  at 29w4d by 11w0d US, now HD#16 for preeclampsia with severe features. Pregnancy is otherwise complicated by severe FGR and asthma.     Some changes to clinical status for Pao today including more Fisher Hixx, mild chest discomfort, high mild range BP, and spontaneous deceleration. She has been very stable from pre-eclampsia perspective, most likely chest tightness consistent with normal changes in setting of progressing pregnancy, however less likely concerning for cardiopulmonary etiology. Normal vital signs including O2 sat, lungs are clear. GERD medications available prn including TUMS and pepcid. If discomfort progresses, consider evaluation with EKG, CXR if concerns for pulmonary edema, or treatment with inhaler for asthma exacerbation. Repeat HELLP  labs in AM or sooner if course changes. Extended monitoring after spontanous decel, currently reassuring with mod variability and + accels.       Roseann Dempsey MD PGY3  Obstetrics & Gynecology  03/22/22

## 2022-03-24 ENCOUNTER — APPOINTMENT (OUTPATIENT)
Dept: ULTRASOUND IMAGING | Facility: CLINIC | Age: 42
End: 2022-03-24
Attending: STUDENT IN AN ORGANIZED HEALTH CARE EDUCATION/TRAINING PROGRAM
Payer: COMMERCIAL

## 2022-03-24 LAB
ALBUMIN SERPL-MCNC: 2.4 G/DL (ref 3.4–5)
ALP SERPL-CCNC: 127 U/L (ref 40–150)
ALT SERPL W P-5'-P-CCNC: 98 U/L (ref 0–50)
ANION GAP SERPL CALCULATED.3IONS-SCNC: 8 MMOL/L (ref 3–14)
AST SERPL W P-5'-P-CCNC: 73 U/L (ref 0–45)
BILIRUB SERPL-MCNC: 0.5 MG/DL (ref 0.2–1.3)
BUN SERPL-MCNC: 13 MG/DL (ref 7–30)
CALCIUM SERPL-MCNC: 10.5 MG/DL (ref 8.5–10.1)
CHLORIDE BLD-SCNC: 109 MMOL/L (ref 94–109)
CO2 SERPL-SCNC: 18 MMOL/L (ref 20–32)
CREAT SERPL-MCNC: 0.58 MG/DL (ref 0.52–1.04)
ERYTHROCYTE [DISTWIDTH] IN BLOOD BY AUTOMATED COUNT: 13.5 % (ref 10–15)
GFR SERPL CREATININE-BSD FRML MDRD: >90 ML/MIN/1.73M2
GLUCOSE BLD-MCNC: 83 MG/DL (ref 70–99)
HCT VFR BLD AUTO: 34.9 % (ref 35–47)
HGB BLD-MCNC: 12.5 G/DL (ref 11.7–15.7)
HOLD SPECIMEN: NORMAL
MCH RBC QN AUTO: 32 PG (ref 26.5–33)
MCHC RBC AUTO-ENTMCNC: 35.8 G/DL (ref 31.5–36.5)
MCV RBC AUTO: 89 FL (ref 78–100)
PLATELET # BLD AUTO: 114 10E3/UL (ref 150–450)
POTASSIUM BLD-SCNC: 3.8 MMOL/L (ref 3.4–5.3)
PROT SERPL-MCNC: 6.3 G/DL (ref 6.8–8.8)
RBC # BLD AUTO: 3.91 10E6/UL (ref 3.8–5.2)
SODIUM SERPL-SCNC: 135 MMOL/L (ref 133–144)
WBC # BLD AUTO: 7.8 10E3/UL (ref 4–11)

## 2022-03-24 PROCEDURE — 85027 COMPLETE CBC AUTOMATED: CPT | Performed by: STUDENT IN AN ORGANIZED HEALTH CARE EDUCATION/TRAINING PROGRAM

## 2022-03-24 PROCEDURE — 82040 ASSAY OF SERUM ALBUMIN: CPT | Performed by: STUDENT IN AN ORGANIZED HEALTH CARE EDUCATION/TRAINING PROGRAM

## 2022-03-24 PROCEDURE — 76818 FETAL BIOPHYS PROFILE W/NST: CPT | Mod: 26 | Performed by: OBSTETRICS & GYNECOLOGY

## 2022-03-24 PROCEDURE — 250N000011 HC RX IP 250 OP 636: Performed by: STUDENT IN AN ORGANIZED HEALTH CARE EDUCATION/TRAINING PROGRAM

## 2022-03-24 PROCEDURE — 36415 COLL VENOUS BLD VENIPUNCTURE: CPT | Performed by: STUDENT IN AN ORGANIZED HEALTH CARE EDUCATION/TRAINING PROGRAM

## 2022-03-24 PROCEDURE — 99231 SBSQ HOSP IP/OBS SF/LOW 25: CPT | Mod: 25 | Performed by: OBSTETRICS & GYNECOLOGY

## 2022-03-24 PROCEDURE — 258N000003 HC RX IP 258 OP 636: Performed by: STUDENT IN AN ORGANIZED HEALTH CARE EDUCATION/TRAINING PROGRAM

## 2022-03-24 PROCEDURE — 250N000013 HC RX MED GY IP 250 OP 250 PS 637: Performed by: STUDENT IN AN ORGANIZED HEALTH CARE EDUCATION/TRAINING PROGRAM

## 2022-03-24 PROCEDURE — 120N000002 HC R&B MED SURG/OB UMMC

## 2022-03-24 PROCEDURE — 76820 UMBILICAL ARTERY ECHO: CPT | Mod: 26 | Performed by: OBSTETRICS & GYNECOLOGY

## 2022-03-24 PROCEDURE — 80053 COMPREHEN METABOLIC PANEL: CPT | Performed by: STUDENT IN AN ORGANIZED HEALTH CARE EDUCATION/TRAINING PROGRAM

## 2022-03-24 PROCEDURE — 76819 FETAL BIOPHYS PROFIL W/O NST: CPT

## 2022-03-24 RX ORDER — BETAMETHASONE SODIUM PHOSPHATE AND BETAMETHASONE ACETATE 3; 3 MG/ML; MG/ML
12 INJECTION, SUSPENSION INTRA-ARTICULAR; INTRALESIONAL; INTRAMUSCULAR; SOFT TISSUE EVERY 24 HOURS
Status: COMPLETED | OUTPATIENT
Start: 2022-03-24 | End: 2022-03-25

## 2022-03-24 RX ADMIN — ASPIRIN 81 MG: 81 TABLET, COATED ORAL at 08:04

## 2022-03-24 RX ADMIN — BETAMETHASONE SODIUM PHOSPHATE AND BETAMETHASONE ACETATE 12 MG: 3; 3 INJECTION, SUSPENSION INTRA-ARTICULAR; INTRALESIONAL; INTRAMUSCULAR at 09:14

## 2022-03-24 RX ADMIN — ENOXAPARIN SODIUM 40 MG: 40 INJECTION SUBCUTANEOUS at 19:59

## 2022-03-24 RX ADMIN — PRENATAL VITAMINS-IRON FUMARATE 27 MG IRON-FOLIC ACID 0.8 MG TABLET 1 TABLET: at 08:04

## 2022-03-24 RX ADMIN — SODIUM CHLORIDE, POTASSIUM CHLORIDE, SODIUM LACTATE AND CALCIUM CHLORIDE 500 ML: 600; 310; 30; 20 INJECTION, SOLUTION INTRAVENOUS at 22:18

## 2022-03-24 NOTE — PLAN OF CARE
Goal Outcome Evaluation:   Plan of care reviewed with: Patient     VSS, pt offers no complaints this shift. Pt reports mild HA this morning, declines tylenol or other interventions. Pt denies visual changes, RUQ pain, SOB or chest pain. Pt denies bleeding, LOF or contractions. Pt reports active FM. EFM as charted, see flowsheets for data. Pt had family visit last evening, pt happy to see her children and spend time with family. Continue current plan of care.

## 2022-03-24 NOTE — PROGRESS NOTES
[facility]  Progress Note  DOS: 3/24/2022    Subjective:     Reports that she is more fatigued this morning and doesn't feel quite well. Denies headache, changes in vision, chest pain, shortness of breath, RUQ pain or other systemic symptoms. Confirms fetal movement    Objective:  Vitals:    03/24/22 0158 03/24/22 0554 03/24/22 0600 03/24/22 0639   BP: (!) 143/84 (!) 147/86     BP Location:       Patient Position:       Cuff Size:       Pulse:       Resp: 16 16     Temp:    99  F (37.2  C)   TempSrc:    Oral   SpO2:       Weight:   105.4 kg (232 lb 6.4 oz)        Gen: Appears well, in NAD  CV:  Regular rate, well perfused  Resp: Normal inspiratory effort  Extr:        Symmetric, no edema    FHT: Baseline 160, mod variability, + accels, no decels  [place]: 0 contractions     Recent Labs:  Results for EL SYLVESTER (MRN [id number]) as of 3/24/2022 09:01   Ref. Range 3/21/2022 11:56 3/23/2022 06:55 3/24/2022 06:37   Sodium Latest Ref Range: 133 - 144 mmol/L   135   Potassium Latest Ref Range: 3.4 - 5.3 mmol/L   3.8   Chloride Latest Ref Range: 94 - 109 mmol/L   109   Carbon Dioxide Latest Ref Range: 20 - 32 mmol/L   18 (L)   Urea Nitrogen Latest Ref Range: 7 - 30 mg/dL   13   Creatinine Latest Ref Range: 0.52 - 1.04 mg/dL 0.63 0.56 0.58   GFR Estimate Latest Ref Range: >60 mL/min/1.73m2 >90 >90 >90   Calcium Latest Ref Range: 8.5 - 10.1 mg/dL   10.5 (H)   Anion Gap Latest Ref Range: 3 - 14 mmol/L   8   Albumin Latest Ref Range: 3.4 - 5.0 g/dL   2.4 (L)   Protein Total Latest Ref Range: 6.8 - 8.8 g/dL   6.3 (L)   Bilirubin Total Latest Ref Range: 0.2 - 1.3 mg/dL   0.5   Alkaline Phosphatase Latest Ref Range: 40 - 150 U/L   127   ALT Latest Ref Range: 0 - 50 U/L 45 64 (H) 98 (H)   AST Latest Ref Range: 0 - 45 U/L 34 46 (H) 73 (H)   Glucose Latest Ref Range: 70 - 99 mg/dL   83   WBC Latest Ref Range: 4.0 - 11.0 10e3/uL 9.2 7.3 7.8   Hemoglobin Latest Ref Range: 11.7 - 15.7 g/dL 13.2 12.4 12.5    Hematocrit Latest Ref Range: 35.0 - 47.0 % 37.7 36.0 34.9 (L)   Platelet Count Latest Ref Range: 150 - 450 10e3/uL 186 129 (L) 114 (L)   RBC Count Latest Ref Range: 3.80 - 5.20 10e6/uL 4.17 3.94 3.91   MCV Latest Ref Range: 78 - 100 fL 90 91 89   MCH Latest Ref Range: 26.5 - 33.0 pg 31.7 31.5 32.0   MCHC Latest Ref Range: 31.5 - 36.5 g/dL 35.0 34.4 35.8   RDW Latest Ref Range: 10.0 - 15.0 % 13.6 13.6 13.5       Assessment and Plan:  Pao Pereira is a 41 year old  at 29w6d by 11w0d US, now HD#18 for preeclampsia with severe features. Pregnancy is otherwise complicated by severe FGR and asthma.     Pre-eclampsia  with Severe Features (BP)  - No signs or symptoms of preeclampsia today. Continue to monitor closely.  - Continue serial BP monitoring. Alert with sustained SBP >160 and/or DBP >110 for acute treatment with antihypertensives.  Plan to avoid labetalol at this time due to maternal bradycardia.  - HELLP labs with worsening trend for LFTs and platelets. AST/ALT trending up AST 46>73, ALT 64>98, plts trending down 186>129>114. Cr stable 0.58.  Continue daily labs  - Continue Serial weights and strict I/O  - Mg started during induction and for 24h postpartum  - Due to worsening labs, recommend rescue course of betamethasone. Reviewed that if labs continue to trend in the wrong direction that delivery may be indicated within the next days to week.  Patient understands and amenable to plan.    Intermittent maternal Bradycardia  - Previous documentation from prior hospitalization and outside pregnancy of HR in 40-50s.  - Patient asymptomatic.   - EKG WNL other than bradycardic  - S/p cardiology, normal increase in HR with light activity, no further workup at this time - recommends reconsult if symptoms or other concerns identified    Severe FGR  Fetal well being  - Today's growth 3/24- sFGR <1%, VIKAS nl, UAD with increased placental resistance with continuous forward flow  - Continue twice weekly  Dopplers and BPP, last 10/10 on 3/24/22  - Continue TID monitoring and prn.   - S/p Betamethasone (3/7-3/8). Rescue course started today.  - S/p NICU consult    PNC  - Labs: Rh pos, Rubella immune. GBS negative (3/7).  Elevated GCT, passed GTT. Other prenatal labs wnl.  - Immunizations: S/p Tdap, COVID vaccines  - Genetics: low risk NIPT  - PPx: Lovenox   - Other: SW involved to assist with separation from children/long hospitalization.                Seen and staffed with Dr. Vivian Nguyen MD  Maternal Fetal Medicine Fellow  3/24/2022 9:06 AM

## 2022-03-24 NOTE — PLAN OF CARE
Pt continues to have elevated BP's, no severe range pressures today. Headache ongoing, patient declines medications at this time. Betamethasone given at 0915 due to AST/ALT trending up and platelets trending down. Patient denies any changes in vision, RUQ or other concerns. See flowsheets for FHR and [place]. Will continue to closely monitor and notify provider of any changes in maternal/fetal status.

## 2022-03-25 LAB
ABO/RH(D): NORMAL
ALT SERPL W P-5'-P-CCNC: 77 U/L (ref 0–50)
ANTIBODY SCREEN: NEGATIVE
AST SERPL W P-5'-P-CCNC: 36 U/L (ref 0–45)
CREAT SERPL-MCNC: 0.54 MG/DL (ref 0.52–1.04)
ERYTHROCYTE [DISTWIDTH] IN BLOOD BY AUTOMATED COUNT: 13.5 % (ref 10–15)
GFR SERPL CREATININE-BSD FRML MDRD: >90 ML/MIN/1.73M2
HCT VFR BLD AUTO: 35.7 % (ref 35–47)
HGB BLD-MCNC: 12.5 G/DL (ref 11.7–15.7)
MCH RBC QN AUTO: 31.8 PG (ref 26.5–33)
MCHC RBC AUTO-ENTMCNC: 35 G/DL (ref 31.5–36.5)
MCV RBC AUTO: 91 FL (ref 78–100)
PLATELET # BLD AUTO: 133 10E3/UL (ref 150–450)
RBC # BLD AUTO: 3.93 10E6/UL (ref 3.8–5.2)
SPECIMEN EXPIRATION DATE: NORMAL
WBC # BLD AUTO: 8.3 10E3/UL (ref 4–11)

## 2022-03-25 PROCEDURE — 36415 COLL VENOUS BLD VENIPUNCTURE: CPT | Performed by: OBSTETRICS & GYNECOLOGY

## 2022-03-25 PROCEDURE — 250N000011 HC RX IP 250 OP 636: Performed by: STUDENT IN AN ORGANIZED HEALTH CARE EDUCATION/TRAINING PROGRAM

## 2022-03-25 PROCEDURE — 86850 RBC ANTIBODY SCREEN: CPT | Performed by: OBSTETRICS & GYNECOLOGY

## 2022-03-25 PROCEDURE — 120N000002 HC R&B MED SURG/OB UMMC

## 2022-03-25 PROCEDURE — 84450 TRANSFERASE (AST) (SGOT): CPT | Performed by: OBSTETRICS & GYNECOLOGY

## 2022-03-25 PROCEDURE — 99231 SBSQ HOSP IP/OBS SF/LOW 25: CPT | Mod: 25 | Performed by: OBSTETRICS & GYNECOLOGY

## 2022-03-25 PROCEDURE — 84460 ALANINE AMINO (ALT) (SGPT): CPT | Performed by: OBSTETRICS & GYNECOLOGY

## 2022-03-25 PROCEDURE — 82565 ASSAY OF CREATININE: CPT | Performed by: OBSTETRICS & GYNECOLOGY

## 2022-03-25 PROCEDURE — 85027 COMPLETE CBC AUTOMATED: CPT | Performed by: OBSTETRICS & GYNECOLOGY

## 2022-03-25 PROCEDURE — 59025 FETAL NON-STRESS TEST: CPT | Mod: 26 | Performed by: OBSTETRICS & GYNECOLOGY

## 2022-03-25 PROCEDURE — 86901 BLOOD TYPING SEROLOGIC RH(D): CPT | Performed by: OBSTETRICS & GYNECOLOGY

## 2022-03-25 PROCEDURE — 250N000013 HC RX MED GY IP 250 OP 250 PS 637: Performed by: STUDENT IN AN ORGANIZED HEALTH CARE EDUCATION/TRAINING PROGRAM

## 2022-03-25 RX ADMIN — BETAMETHASONE SODIUM PHOSPHATE AND BETAMETHASONE ACETATE 12 MG: 3; 3 INJECTION, SUSPENSION INTRA-ARTICULAR; INTRALESIONAL; INTRAMUSCULAR at 09:51

## 2022-03-25 RX ADMIN — ENOXAPARIN SODIUM 40 MG: 40 INJECTION SUBCUTANEOUS at 20:19

## 2022-03-25 RX ADMIN — ASPIRIN 81 MG: 81 TABLET, COATED ORAL at 09:50

## 2022-03-25 RX ADMIN — PRENATAL VITAMINS-IRON FUMARATE 27 MG IRON-FOLIC ACID 0.8 MG TABLET 1 TABLET: at 09:50

## 2022-03-25 NOTE — PROGRESS NOTES
"SPIRITUAL HEALTH SERVICES  SPIRITUAL ASSESSMENT Progress Note  [facility] ([facility] [place]) 4BOB     PRIMARY FOCUS:   Introduction to [facility]  Assessment of needs  Support for coping    REFERRAL SOURCE:  initiated due to length of stay.    ILLNESS CIRCUMSTANCES:   Reviewed documentation. Reflective conversation shared with patient, Pao and spouse, Abel, which integrated elements of illness and family narratives.   Context of Serious Illness/Symptom(s) - Patient reflected that her \"numbers are worsening again and will likely deliver within the next week.\"  Resources for Support - She names her spouse, her parents, friends and medical team.     DISTRESS:   Emotional/Spiritual/Existential Distress - Patient reflected on her admission to the hospital and how a routine appointment evolved into hospitalization until delivery. She was tearful as she reflected on the support she has received from family and friends. We reflected together on how many changes they have had to navigate within the last few weeks.   Jewish Distress - None identified.  Social/Cultural/Economic Distress - Lengthy hospitalization and being a part from older children.     SPIRITUAL/Mandaeism COPING:   Scientology/Eunice - Pentecostal - attend [employer] in [place].  Spiritual Practice(s) - Scientologist eunice is significant for them, attending online or in-person Anabaptism services, prayer.   Emotional/Relational/Existential Connections - Patient and spouse both named feeling well supported by family and friends.     GOALS OF CARE:  Goals of Care - Patient shared that continuing to receive information from the medical team is incredibly helpful at this time and is the most significant source of relief for her when she is concerned about next steps/delivery.   Meaning/Sense-Making - Not discussed at this time.     Patient and spouse are open to continued support. They are relieved to know that Faith is an option if there was an urgent need, otherwise they " will wait until baby discharges from the NICU. They may want the   to visit once baby is born but will decide that at a later date.      PLAN: We planned that I would likely follow-up a few days after delivery but patient and spouse are aware that chaplains remain available 24/7 should they need more timely support.       Shane Chopra    Pager [phone]    [facility] remains available 24/7 for emergent requests/referrals, either by having the switchboard page the on-call  or by entering an ASAP/STAT consult in Epic (this will also page the on-call ).

## 2022-03-25 NOTE — PLAN OF CARE
BP's remain elevated but no severe range BP's. Pt denies headache, changes in vision or RUQ pain. New bruise noted on pt's right leg, and pt did have another nose bleed today. Continuing to monitor labs, platelets stable today. Pt's spouse, Abel, at bedside this morning for rounds and NNP came to talk with patient and spouse about NICU stay if delivery occurs within the next week. Second dose of rescue beta given this morning. Acupuncture came by in the afternoon. See flowsheets for FHR and [place]. Pt to notify staff of any concerns. Will continue with current plan of care and notify provider of changes in maternal/fetal status.

## 2022-03-25 NOTE — PROGRESS NOTES
Parents requested follow-up consult due to worsening pre-E labs and increased likelihood of delivery. Discussed generally positive outcomes expected at >30 weeks gestation, common short term issues, such as immature lungs, feedings, and routine issues such as hyperbili and surveillance HUS.     Parents expressed relief after discussion and had no further questions.    LUCILLE Porter 3/25/2022 9:53 AM

## 2022-03-25 NOTE — PROGRESS NOTES
Acupuncture Clinical Internship Intake and Treatment Documentation   [facility]    Date:  3/25/2022  Patient Name:  Pao Pereira   YOB: 1980     Repeat Patient:  yes  Has patient had acupoint/acupressure treatment before:  yes    Signed consent placed in the medical record:  yes  Patient/Family verbalizes understanding of risks and benefits:  yes  Required information provided to patient:  yes    Diagnosis:  Encounter for triage in pregnant patient [Z36.89]  Preeclampsia [O14.90]    Patient condition and treatment: Preeclaimpsia  Reason for Intervention Today/Chief Complaint:  Chest oppession    Isolation:  No  Type:  None    PRE-SCORE:  mild    Other Western medical information:  NA    Medications   Current Facility-Administered Medications:     acetaminophen (TYLENOL) tablet 650 mg, 650 mg, Oral, Q4H PRN, Eber Simmons MD, 650 mg at 03/14/22 1806    aspirin EC tablet 81 mg, 81 mg, Oral, Daily, Roseann Dempsey MD, 81 mg at 03/25/22 0950    glucose gel 15-30 g, 15-30 g, Oral, Q15 Min PRN **OR** dextrose 50 % injection 25-50 mL, 25-50 mL, Intravenous, Q15 Min PRN **OR** glucagon kit 1 mg, 1 mg, Subcutaneous, Q15 Min PRN, Pamela Mireles APRN CNP    enoxaparin ANTICOAGULANT (LOVENOX) injection 40 mg, 40 mg, Subcutaneous, Q24H, Roseann Dempsey MD, 40 mg at 03/24/22 1959    hydrALAZINE (APRESOLINE) injection 10 mg, 10 mg, Intravenous, Q20 Min PRN, Eber Simmons MD    labetalol (NORMODYNE/TRANDATE) algorithm-medication instruction, , Does not apply, Continuous PRN, Eber Simmons MD    labetalol (NORMODYNE/TRANDATE) injection 20-80 mg, 20-80 mg, Intravenous, Q10 Min PRN, Eber Simmons MD, 20 mg at 03/07/22 2115    LORazepam (ATIVAN) injection 2 mg, 2 mg, Intravenous, Q3 Min PRN, Eber Simmons MD    magnesium sulfate 2 g in water intermittent infusion, 2 g, Intravenous, Once PRN seizures, Eber Simmons MD    magnesium sulfate 4 g in 100 mL sterile water  (premade), 4 g, Intravenous, Once PRN seizures, Eber Simmons MD    magnesium sulfate injection 10 g, 10 g, Intramuscular, Once PRN, Eber Simmons MD    metoclopramide (REGLAN) injection 10 mg, 10 mg, Intravenous, Q6H PRN **OR** metoclopramide (REGLAN) tablet 10 mg, 10 mg, Oral, Q6H PRN, Eber Simmons MD    ondansetron (ZOFRAN-ODT) ODT tab 4 mg, 4 mg, Oral, Q6H PRN **OR** ondansetron (ZOFRAN) injection 4 mg, 4 mg, Intravenous, Q6H PRN, Eber Simmons MD    prenatal multivitamin w/iron per tablet 1 tablet, 1 tablet, Oral, Daily, Roseann Dempsey MD, 1 tablet at 03/25/22 0950    prochlorperazine (COMPAZINE) injection 10 mg, 10 mg, Intravenous, Q6H PRN **OR** prochlorperazine (COMPAZINE) tablet 10 mg, 10 mg, Oral, Q6H PRN **OR** prochlorperazine (COMPAZINE) suppository 25 mg, 25 mg, Rectal, Q12H PRN, Eber Simmons MD    sodium chloride (PF) 0.9% PF flush 3 mL, 3 mL, Intracatheter, Q8H, María Dempsey MD, 3 mL at 03/25/22 0804    No current outpatient medications on file.       Pre-Treatment Assessment  Chief Complaint/ Reason for Intervention Today:  Stress and chest oppression  Chief Complaint Pre-Score:  mild   Describe:  Patient experienced chest oppression due to stress  Pain Location:  Chest that feel like someone press from the back to front  Pre Session Pain:  Mild  Pre Session Anxiety:  None  Pre Session Nausea:  None    10 Traditional Chinese Medicine Assessment Questions  - Cold/ Heat:  Cold on one night but normal now  - Sweat: some sweat on ears and neck   - Headaches/Body aches:  Headache comes and goes  - Chest/Abdomen:  Chest oppression, fullness  - Digestion: has acid reflux, fullness from the chest  - Bowel Movement/Urination: Good  - Hearing/Vision: good  - Sleep (prior to hospital): Good  - Energy:  Good today  - Emotions: good today but anxiety and stress due to the change in treatment plan  - Ob Gyn: NA  - Miscellaneous:  The BP was good last week but changed this week. It stays high  this week    Traditional Chinese Medicine Assessment  - TONGUE:  NA  - PULSE: slippery and wiry  - OBSERVATIONS: more stress this week    Traditional Chinese Medicine Diagnosis  - BRANCH:  Chest oppression due to liver Qi stagnation  - ROOT:  Stress due to liver qi stagnation    Traditional Chinese Medicine Treatment  - ACUPUNCTURE:  Ear: Nolasco men, point 0, liver, Si 19, Tw17, Riaz 17, kid 9, pc6 (L), Sp4 (R), Maria Elena 7 (R), Kid 6 (L)   - NEEDLE COUNT: In: 17   Out: 17    Time In: 1:50 -2:15     Magnet informed consent signed and given:  yes    Post Treatment Assessment  Chief complaint post score:  better  Post Session Observation:  Chest oppression was better, feel better  Patient/Family Education:  NA  Verbal information provided:  yes  Written information provided:  yes  All questions answered at time of treatment:  yes    Treatment/Procedure(s) performed by:  Pati Leon    Date: 3/25/2022     I attest that this acupuncture treatment was done under my supervision and this note is complete and true.     Supervising acupuncturist:  Shay Gleason LAc [facility] #: [id number]  P: [phone]

## 2022-03-25 NOTE — PLAN OF CARE
"Goal Outcome Evaluation:        Problem: Plan of Care - These are the overarching goals to be used throughout the patient stay.    Goal: Plan of Care Review/Shift Note  Description: The Plan of Care Review/Shift note should be completed every shift.  The Outcome Evaluation is a brief statement about your assessment that the patient is improving, declining, or no change.  This information will be displayed automatically on your shift note.  Outcome: Ongoing, Progressing  Goal: Patient-Specific Goal (Individualized)  Description: You can add care plan individualizations to a care plan. Examples of Individualization might be:  \"Parent requests to be called daily at 9am for status\", \"I have a hard time hearing out of my right ear\", or \"Do not touch me to wake me up as it startles me\".  Outcome: Ongoing, Progressing  Goal: Absence of Hospital-Acquired Illness or Injury  Outcome: Ongoing, Progressing  Intervention: Prevent and Manage VTE (Venous Thromboembolism) Risk  Recent Flowsheet Documentation  Taken 3/24/2022 2030 by Rosy Watters RN  Activity Management: up ad ashanti  Goal: Optimal Comfort and Wellbeing  Outcome: Ongoing, Progressing  Intervention: Provide Person-Centered Care  Recent Flowsheet Documentation  Taken 3/24/2022 2030 by Rosy Watters RN  Trust Relationship/Rapport:   care explained   choices provided   questions answered   questions encouraged   reassurance provided   thoughts/feelings acknowledged  Goal: Readiness for Transition of Care  Outcome: Ongoing, Progressing     Problem: Hypertensive Disorders in Pregnancy  Goal: Maternal-Fetal Stabilization  Outcome: Ongoing, Progressing          Pt. Has been vitally stable for this shift, denies pain or S&S of HTN, Got a fluid bolus near the beginning of my shift for infrequent variable decels while on the monitor. No decels after bolus noted. Will receive 2nd beta today. No other concerns noted at this time.          "

## 2022-03-25 NOTE — PROGRESS NOTES
[facility]  Progress Note  DOS: 3/25/2022    Subjective:     Reports feeling better than yesterday. No headaches or vision changes. No chest pain, shortness of breath. No VB or LOF. Feels good fetal movement. No abdominal pain.  at bedside. Both asked appropriate questions about preeclampsia and reasons for delivery that were answered    Objective:  Vitals:    22 2140 22 0155 22 0525 22 0734   BP: 133/83 125/71 115/71 124/74   BP Location:   Left arm Left arm   Patient Position:   Semi-Gibbs's    Pulse:       Resp: 16 16 16    Temp: 97.8  F (36.6  C) 97.7  F (36.5  C) 97.8  F (36.6  C) 98.6  F (37  C)   TempSrc: Oral  Oral Oral   SpO2: 97%      Weight:           Gen: Appears well, in NAD  CV:  Regular rate, well perfused  Resp: Normal inspiratory effort  Extr:     trace edema    FHT: Baseline 150, mod variability, + accels, 2 decels after contractions  [place]: Occasional contractions    Recent Labs:  Results for PAO PEREIRA (MRN [id number]) as of 3/25/2022 08:48  Sodium 135   Potassium 3.8   Chloride 109   Carbon Dioxide 18 (L)   Urea Nitrogen 13   Creatinine 0.58   GFR Estimate >90   Calcium 10.5 (H)   Anion Gap 8   Albumin 2.4 (L)   Protein Total 6.3 (L)   Bilirubin Total 0.5   Alkaline Phosphatase 127   ALT 98 (H)   AST 73 (H)   Glucose 83   WBC 7.8   Hemoglobin 12.5   Hematocrit 34.9 (L)   Platelet Count 114 (L)   RBC Count 3.91   MCV 89   MCH 32.0   MCHC 35.8   RDW 13.5       Assessment and Plan:  Pao Pereira is a 41 year old  at 30w0d by 11w0d US, now HD#19 for preeclampsia with severe features. Pregnancy is otherwise complicated by severe FGR and asthma.     Pre-eclampsia  with Severe Features (BP)  - No signs or symptoms of preeclampsia today. Continue to monitor closely.  - Continue serial BP monitoring. Alert with sustained SBP >160 and/or DBP >110 for acute treatment with antihypertensives.  Plan to avoid labetalol at this  time due to maternal bradycardia.  - HELLP labs with worsening trend for LFTs and platelets. AST/ALT trending up AST 46>73, ALT 64>98, plts trending down 186>129>114. Cr stable 0.58.  Continue daily labs, awaiting this morning's labs  - Continue Serial weights and strict I/O  - Mg started during induction and for 24h postpartum  - Due to worsening labs (increased liver enzymes, decreasing platelets), started course of betamethasone for fetal lung maturity, she will receive 2nd dose today. Reviewed that if labs continue to trend in the wrong direction that delivery may be indicated within the next days to week.  Also discussed if her blood pressures become difficult to control or she develops persistent headache, those would be indications for delivery. As always, reviewed that there can be fetal indications for delivery but that her US and NSTs at this time are overall reassuring regarding fetal status. Patient understands and amenable to plan.    Intermittent maternal Bradycardia  - Previous documentation from prior hospitalization and outside pregnancy of HR in 40-50s.  - Patient asymptomatic.   - EKG WNL other than bradycardic  - S/p cardiology, normal increase in HR with light activity, no further workup at this time - recommends reconsult if symptoms or other concerns identified    Severe FGR  Fetal well being  - yesterday's growth 3/24- sFGR <1%, VIKAS nl, UAD with increased placental resistance with continuous forward flow  - Continue twice weekly Dopplers and BPP, last 10/10 on 3/24/22  - Continue TID monitoring and prn.   - S/p Betamethasone (3/7-3/8). Rescue course started today.  - S/p NICU consult    PNC  - Labs: Rh pos, Rubella immune. GBS negative (3/7).  Elevated GCT, passed GTT. Other prenatal labs wnl.  - Immunizations: S/p Tdap, COVID vaccines  - Genetics: low risk NIPT  - PPx: Lovenox   - Other: SW involved to assist with separation from children/long hospitalization.                Seen and staffed  with Dr. Vivian Ames MD  Obstetrics & Gynecology PGY-3  8:56 AM 3/25/2022

## 2022-03-26 LAB
ALBUMIN SERPL-MCNC: 2.4 G/DL (ref 3.4–5)
ALP SERPL-CCNC: 134 U/L (ref 40–150)
ALT SERPL W P-5'-P-CCNC: 64 U/L (ref 0–50)
ANION GAP SERPL CALCULATED.3IONS-SCNC: 6 MMOL/L (ref 3–14)
AST SERPL W P-5'-P-CCNC: 27 U/L (ref 0–45)
BILIRUB SERPL-MCNC: 0.3 MG/DL (ref 0.2–1.3)
BUN SERPL-MCNC: 16 MG/DL (ref 7–30)
CALCIUM SERPL-MCNC: 11.3 MG/DL (ref 8.5–10.1)
CHLORIDE BLD-SCNC: 111 MMOL/L (ref 94–109)
CO2 SERPL-SCNC: 21 MMOL/L (ref 20–32)
CREAT SERPL-MCNC: 0.61 MG/DL (ref 0.52–1.04)
ERYTHROCYTE [DISTWIDTH] IN BLOOD BY AUTOMATED COUNT: 13.8 % (ref 10–15)
GFR SERPL CREATININE-BSD FRML MDRD: >90 ML/MIN/1.73M2
GLUCOSE BLD-MCNC: 101 MG/DL (ref 70–99)
HCT VFR BLD AUTO: 36.3 % (ref 35–47)
HGB BLD-MCNC: 12.5 G/DL (ref 11.7–15.7)
MCH RBC QN AUTO: 32 PG (ref 26.5–33)
MCHC RBC AUTO-ENTMCNC: 34.4 G/DL (ref 31.5–36.5)
MCV RBC AUTO: 93 FL (ref 78–100)
PLATELET # BLD AUTO: 149 10E3/UL (ref 150–450)
POTASSIUM BLD-SCNC: 4.5 MMOL/L (ref 3.4–5.3)
PROT SERPL-MCNC: 6.1 G/DL (ref 6.8–8.8)
RBC # BLD AUTO: 3.91 10E6/UL (ref 3.8–5.2)
SODIUM SERPL-SCNC: 138 MMOL/L (ref 133–144)
WBC # BLD AUTO: 10 10E3/UL (ref 4–11)

## 2022-03-26 PROCEDURE — 82040 ASSAY OF SERUM ALBUMIN: CPT | Performed by: STUDENT IN AN ORGANIZED HEALTH CARE EDUCATION/TRAINING PROGRAM

## 2022-03-26 PROCEDURE — 36415 COLL VENOUS BLD VENIPUNCTURE: CPT | Performed by: STUDENT IN AN ORGANIZED HEALTH CARE EDUCATION/TRAINING PROGRAM

## 2022-03-26 PROCEDURE — 250N000011 HC RX IP 250 OP 636: Performed by: STUDENT IN AN ORGANIZED HEALTH CARE EDUCATION/TRAINING PROGRAM

## 2022-03-26 PROCEDURE — 99232 SBSQ HOSP IP/OBS MODERATE 35: CPT | Mod: GC | Performed by: OBSTETRICS & GYNECOLOGY

## 2022-03-26 PROCEDURE — 85027 COMPLETE CBC AUTOMATED: CPT | Performed by: STUDENT IN AN ORGANIZED HEALTH CARE EDUCATION/TRAINING PROGRAM

## 2022-03-26 PROCEDURE — 120N000002 HC R&B MED SURG/OB UMMC

## 2022-03-26 PROCEDURE — 80053 COMPREHEN METABOLIC PANEL: CPT | Performed by: STUDENT IN AN ORGANIZED HEALTH CARE EDUCATION/TRAINING PROGRAM

## 2022-03-26 PROCEDURE — 250N000013 HC RX MED GY IP 250 OP 250 PS 637: Performed by: STUDENT IN AN ORGANIZED HEALTH CARE EDUCATION/TRAINING PROGRAM

## 2022-03-26 RX ADMIN — ASPIRIN 81 MG: 81 TABLET, COATED ORAL at 08:13

## 2022-03-26 RX ADMIN — ENOXAPARIN SODIUM 40 MG: 40 INJECTION SUBCUTANEOUS at 20:05

## 2022-03-26 RX ADMIN — PRENATAL VITAMINS-IRON FUMARATE 27 MG IRON-FOLIC ACID 0.8 MG TABLET 1 TABLET: at 08:14

## 2022-03-26 NOTE — PLAN OF CARE
"Goal Outcome Evaluation:                    Pt feeling well. Appreciated visit with daughter and helping with hair for father-daughter dance this parisa. Denies s/s pre-e. Elevated BP, 15 min later under threshold for antihypertensives. Will cont to monitor. A.M. labs and pt wonder when \"honeymood\" period from beta will end and labs will again have worsening trend. Plans for monitoring after 1900 this parisa, watching movies or reading. Pt to call with needs.  "

## 2022-03-26 NOTE — PROGRESS NOTES
[facility]  Progress Note  DOS: 3/26/2022    Subjective:     Reports feeling today compared to a few days ago when her labs had initially increased. No headaches or vision changes. No chest pain, shortness of breath. No VB or LOF. Feels good fetal movement. No abdominal pain.  on phone for our visit. Both reassured    Objective:  Vitals:    22 0000 22 0555 22 0600   BP: 136/78 127/69  123/71   BP Location: Left arm Left arm  Left arm   Patient Position: Semi-Gibbs's Supine  Semi-Gibbs's   Cuff Size:    Adult Large   Pulse:       Resp: 18 18  18   Temp: 97.7  F (36.5  C)   98.2  F (36.8  C)   TempSrc: Oral   Oral   SpO2:       Weight:   105.6 kg (232 lb 11.2 oz)        Gen: Appears well, in NAD  CV:  Regular rate, well perfused  Resp: Normal inspiratory effort  Extr:     trace edema    FHT: Baseline 145, mod variability, + accels, no obvious decels  [place]: quiet    Recent Labs:  Results for PAO PEREIRA (MRN [id number]) as of 3/26/2022 10:54  Sodium 138   Potassium 4.5   Chloride 111 (H)   Carbon Dioxide 21   Urea Nitrogen 16   Creatinine 0.61   GFR Estimate >90   Calcium 11.3 (H)   Anion Gap 6   Albumin 2.4 (L)   Protein Total 6.1 (L)   Bilirubin Total 0.3   Alkaline Phosphatase 134   ALT 64 (H)   AST 27   Glucose 101 (H)   WBC 10.0   Hemoglobin 12.5   Hematocrit 36.3   Platelet Count 149 (L)   RBC Count 3.91   MCV 93   MCH 32.0   MCHC 34.4   RDW 13.8       Assessment and Plan:  Pao Pereira is a 41 year old  at 30w1d by 11w0d US, now HD#20 for preeclampsia with severe features. Pregnancy is otherwise complicated by severe FGR and asthma.     Pre-eclampsia  with Severe Features (BP)  - No signs or symptoms of preeclampsia today. Continue to monitor closely.  - Continue serial BP monitoring. Alert with sustained SBP >160 and/or DBP >110 for acute treatment with antihypertensives.  Plan to avoid labetalol at this time due to  maternal bradycardia.  - HELLP labs with worsening trend for LFTs and platelets on 3/24 which is why BMZ course was started. AST/ALT trend now improved, AST 46>73>36>27, ALT 64>98>77>64, plts trending back up, 186>129>114>133>149. Cr stable 0.61.  Continue daily labs  - Continue Serial weights and strict I/O  - Mg started during induction and for 24h postpartum  - Due to worsening labs on 3/24 (increased liver enzymes, decreasing platelets), started course of betamethasone for fetal lung maturity. She is now s/p both doses. Reviewed that if labs continue to trend in the wrong direction that delivery may be indicated within the next days to weeks.  Also discussed if her blood pressures become difficult to control or she develops persistent headache, those would be indications for delivery. As always, reviewed that there can be fetal indications for delivery but that her US and NSTs at this time are overall reassuring regarding fetal status. Patient understands and amenable to plan. Currently she is feeling well, with appropriate blood pressures, appropriate TID NSTs, and labs improving so she is stable.    Intermittent maternal Bradycardia  - Previous documentation from prior hospitalization and outside pregnancy of HR in 40-50s.  - Patient asymptomatic.   - EKG WNL other than bradycardic  - S/p cardiology, normal increase in HR with light activity, no further workup at this time - recommends reconsult if symptoms or other concerns identified    Severe FGR  Fetal well being  - growth 3/24- sFGR <1%, VIKAS nl, UAD with increased placental resistance with continuous forward flow  - Continue twice weekly Dopplers and BPP, last 10/10 on 3/24/22  - Continue TID monitoring and prn.   - S/p Betamethasone (3/7-3/8) and rescue course (3/25-2/5)  - S/p NICU consult    PNC  - Labs: Rh pos, Rubella immune. GBS negative (3/7).  Elevated GCT, passed GTT. Other prenatal labs wnl.  - Immunizations: S/p Tdap, COVID vaccines  -  Genetics: low risk NIPT  - PPx: Lovenox   - Other: SW involved to assist with separation from children/long hospitalization.                Seen and staffed with Dr. Gaviota Ames MD  Obstetrics & Gynecology PGY-3  3/26/22    Physician Attestation   I, Michelle Meek MD, saw this patient with the resident and agree with the resident/fellow's findings and plan of care as documented in the note.      I personally reviewed vital signs, medications, labs and imaging.    Aleman findings: Pao is doing well today. Now that she is feeling well she realizes how un-well she felt several days again. Her preeclampsia/HELLP labs continue to improve following the rescue course of betamethasone. We will continue to monitor very closely. If does develop plts <100k, AST or ALT twice the upper limit of normal or creatinine of >1.1 mg/dL we would move forward with delivery. Continue with three times daily NST and twice weekly BPP with UA Doppler. Inpatient management until delivery.    Michelle Meek MD  Date of Service (when I saw the patient): 03/26/22    Time Spent on this Encounter   I spent 25 minutes on the unit/floor managing the care of Pao Pereira. Over 50% of my time was spent on the following:   - Counseling the patient and/or family regarding: diagnostic results, prognosis and recommended follow-up  - Coordination of care with the: nurse    See above    Michelle Meek MD

## 2022-03-26 NOTE — PLAN OF CARE
S: Status Update  B:  at 30.1 weeks gestation admitted for pre-eclampsia with severe features and FGR. Allergies: Dust mites and Nickel. Labs: O Positive, GBS: Negative, Rubella: Immune, HIV: Non-Reactive, Hep B: Non-Reactive, COVID: Negative. Pregnancy Related Complications: Pre-Eclampsia with severe features, AMA, fetal growth restriction, GDM, persistent maternal bradycardia, obesity.  A: Has been resting throughout the night. Vital signs WDL, afebrile. Denies headache, vision changes and RUQ pain. Reports positive fetal movement. Denies leaking of fluid and vaginal bleeding. Up in room ad ashanti. EFM/[place] appropriate for gestational age - see flowsheet. Monitoring intake and output, as well as daily weights. Platelets increased and AST/ALT decreased from yesterday mornings labs.   R: Continue with current plan of care. Report given to BETTY Alvarenga. Care relinquished.

## 2022-03-27 ENCOUNTER — ANESTHESIA EVENT (OUTPATIENT)
Dept: OBGYN | Facility: CLINIC | Age: 42
End: 2022-03-27
Payer: COMMERCIAL

## 2022-03-27 ENCOUNTER — ANESTHESIA (OUTPATIENT)
Dept: OBGYN | Facility: CLINIC | Age: 42
End: 2022-03-27
Payer: COMMERCIAL

## 2022-03-27 LAB
ALBUMIN SERPL-MCNC: 2.2 G/DL (ref 3.4–5)
ALP SERPL-CCNC: 134 U/L (ref 40–150)
ALT SERPL W P-5'-P-CCNC: 136 U/L (ref 0–50)
ALT SERPL W P-5'-P-CCNC: 232 U/L (ref 0–50)
ALT SERPL W P-5'-P-CCNC: 247 U/L (ref 0–50)
ALT SERPL W P-5'-P-CCNC: 59 U/L (ref 0–50)
ANION GAP SERPL CALCULATED.3IONS-SCNC: 5 MMOL/L (ref 3–14)
APTT PPP: 23 SECONDS (ref 22–38)
AST SERPL W P-5'-P-CCNC: 109 U/L (ref 0–45)
AST SERPL W P-5'-P-CCNC: 211 U/L (ref 0–45)
AST SERPL W P-5'-P-CCNC: 238 U/L (ref 0–45)
AST SERPL W P-5'-P-CCNC: 29 U/L (ref 0–45)
BILIRUB SERPL-MCNC: 0.2 MG/DL (ref 0.2–1.3)
BUN SERPL-MCNC: 16 MG/DL (ref 7–30)
CALCIUM SERPL-MCNC: 10.7 MG/DL (ref 8.5–10.1)
CHLORIDE BLD-SCNC: 111 MMOL/L (ref 94–109)
CO2 SERPL-SCNC: 22 MMOL/L (ref 20–32)
CREAT SERPL-MCNC: 0.59 MG/DL (ref 0.52–1.04)
CREAT SERPL-MCNC: 0.6 MG/DL (ref 0.52–1.04)
CREAT SERPL-MCNC: 0.61 MG/DL (ref 0.52–1.04)
CREAT SERPL-MCNC: 0.69 MG/DL (ref 0.52–1.04)
ERYTHROCYTE [DISTWIDTH] IN BLOOD BY AUTOMATED COUNT: 13.7 % (ref 10–15)
ERYTHROCYTE [DISTWIDTH] IN BLOOD BY AUTOMATED COUNT: 13.7 % (ref 10–15)
ERYTHROCYTE [DISTWIDTH] IN BLOOD BY AUTOMATED COUNT: 13.8 % (ref 10–15)
ERYTHROCYTE [DISTWIDTH] IN BLOOD BY AUTOMATED COUNT: 13.8 % (ref 10–15)
FIBRINOGEN PPP-MCNC: 474 MG/DL (ref 170–490)
GFR SERPL CREATININE-BSD FRML MDRD: >90 ML/MIN/1.73M2
GLUCOSE BLD-MCNC: 77 MG/DL (ref 70–99)
HCT VFR BLD AUTO: 30.8 % (ref 35–47)
HCT VFR BLD AUTO: 33.4 % (ref 35–47)
HCT VFR BLD AUTO: 34.4 % (ref 35–47)
HCT VFR BLD AUTO: 38.3 % (ref 35–47)
HGB BLD-MCNC: 10.8 G/DL (ref 11.7–15.7)
HGB BLD-MCNC: 11.2 G/DL (ref 11.7–15.7)
HGB BLD-MCNC: 11.8 G/DL (ref 11.7–15.7)
HGB BLD-MCNC: 12.9 G/DL (ref 11.7–15.7)
INR PPP: 0.94 (ref 0.86–1.14)
MCH RBC QN AUTO: 31.2 PG (ref 26.5–33)
MCH RBC QN AUTO: 31.2 PG (ref 26.5–33)
MCH RBC QN AUTO: 31.5 PG (ref 26.5–33)
MCH RBC QN AUTO: 31.6 PG (ref 26.5–33)
MCHC RBC AUTO-ENTMCNC: 33.5 G/DL (ref 31.5–36.5)
MCHC RBC AUTO-ENTMCNC: 33.7 G/DL (ref 31.5–36.5)
MCHC RBC AUTO-ENTMCNC: 34.3 G/DL (ref 31.5–36.5)
MCHC RBC AUTO-ENTMCNC: 35.1 G/DL (ref 31.5–36.5)
MCV RBC AUTO: 90 FL (ref 78–100)
MCV RBC AUTO: 92 FL (ref 78–100)
MCV RBC AUTO: 93 FL (ref 78–100)
MCV RBC AUTO: 93 FL (ref 78–100)
PLATELET # BLD AUTO: 104 10E3/UL (ref 150–450)
PLATELET # BLD AUTO: 146 10E3/UL (ref 150–450)
PLATELET # BLD AUTO: 153 10E3/UL (ref 150–450)
PLATELET # BLD AUTO: 95 10E3/UL (ref 150–450)
POTASSIUM BLD-SCNC: 4.2 MMOL/L (ref 3.4–5.3)
PROT SERPL-MCNC: 5.7 G/DL (ref 6.8–8.8)
RBC # BLD AUTO: 3.42 10E6/UL (ref 3.8–5.2)
RBC # BLD AUTO: 3.59 10E6/UL (ref 3.8–5.2)
RBC # BLD AUTO: 3.75 10E6/UL (ref 3.8–5.2)
RBC # BLD AUTO: 4.14 10E6/UL (ref 3.8–5.2)
SODIUM SERPL-SCNC: 138 MMOL/L (ref 133–144)
TROPONIN I SERPL HS-MCNC: 9 NG/L
WBC # BLD AUTO: 12.2 10E3/UL (ref 4–11)
WBC # BLD AUTO: 12.4 10E3/UL (ref 4–11)
WBC # BLD AUTO: 12.9 10E3/UL (ref 4–11)
WBC # BLD AUTO: 8.1 10E3/UL (ref 4–11)

## 2022-03-27 PROCEDURE — 271N000001 HC OR GENERAL SUPPLY NON-STERILE: Performed by: OBSTETRICS & GYNECOLOGY

## 2022-03-27 PROCEDURE — 370N000017 HC ANESTHESIA TECHNICAL FEE, PER MIN: Performed by: OBSTETRICS & GYNECOLOGY

## 2022-03-27 PROCEDURE — 84484 ASSAY OF TROPONIN QUANT: CPT | Performed by: STUDENT IN AN ORGANIZED HEALTH CARE EDUCATION/TRAINING PROGRAM

## 2022-03-27 PROCEDURE — 82565 ASSAY OF CREATININE: CPT | Performed by: STUDENT IN AN ORGANIZED HEALTH CARE EDUCATION/TRAINING PROGRAM

## 2022-03-27 PROCEDURE — 710N000010 HC RECOVERY PHASE 1, LEVEL 2, PER MIN: Performed by: OBSTETRICS & GYNECOLOGY

## 2022-03-27 PROCEDURE — 250N000013 HC RX MED GY IP 250 OP 250 PS 637: Performed by: STUDENT IN AN ORGANIZED HEALTH CARE EDUCATION/TRAINING PROGRAM

## 2022-03-27 PROCEDURE — 84450 TRANSFERASE (AST) (SGOT): CPT | Performed by: STUDENT IN AN ORGANIZED HEALTH CARE EDUCATION/TRAINING PROGRAM

## 2022-03-27 PROCEDURE — 85027 COMPLETE CBC AUTOMATED: CPT | Performed by: STUDENT IN AN ORGANIZED HEALTH CARE EDUCATION/TRAINING PROGRAM

## 2022-03-27 PROCEDURE — 250N000009 HC RX 250: Performed by: NURSE ANESTHETIST, CERTIFIED REGISTERED

## 2022-03-27 PROCEDURE — 250N000011 HC RX IP 250 OP 636: Performed by: ANESTHESIOLOGY

## 2022-03-27 PROCEDURE — 85384 FIBRINOGEN ACTIVITY: CPT | Performed by: OBSTETRICS & GYNECOLOGY

## 2022-03-27 PROCEDURE — 36415 COLL VENOUS BLD VENIPUNCTURE: CPT | Performed by: STUDENT IN AN ORGANIZED HEALTH CARE EDUCATION/TRAINING PROGRAM

## 2022-03-27 PROCEDURE — 84460 ALANINE AMINO (ALT) (SGPT): CPT | Performed by: STUDENT IN AN ORGANIZED HEALTH CARE EDUCATION/TRAINING PROGRAM

## 2022-03-27 PROCEDURE — 59514 CESAREAN DELIVERY ONLY: CPT | Mod: GC | Performed by: OBSTETRICS & GYNECOLOGY

## 2022-03-27 PROCEDURE — 250N000011 HC RX IP 250 OP 636: Performed by: NURSE ANESTHETIST, CERTIFIED REGISTERED

## 2022-03-27 PROCEDURE — 272N000001 HC OR GENERAL SUPPLY STERILE: Performed by: OBSTETRICS & GYNECOLOGY

## 2022-03-27 PROCEDURE — 360N000076 HC SURGERY LEVEL 3, PER MIN: Performed by: OBSTETRICS & GYNECOLOGY

## 2022-03-27 PROCEDURE — 120N000002 HC R&B MED SURG/OB UMMC

## 2022-03-27 PROCEDURE — 999N000141 HC STATISTIC PRE-PROCEDURE NURSING ASSESSMENT: Performed by: OBSTETRICS & GYNECOLOGY

## 2022-03-27 PROCEDURE — 258N000003 HC RX IP 258 OP 636: Performed by: NURSE ANESTHETIST, CERTIFIED REGISTERED

## 2022-03-27 PROCEDURE — 85610 PROTHROMBIN TIME: CPT | Performed by: OBSTETRICS & GYNECOLOGY

## 2022-03-27 PROCEDURE — 258N000003 HC RX IP 258 OP 636: Performed by: STUDENT IN AN ORGANIZED HEALTH CARE EDUCATION/TRAINING PROGRAM

## 2022-03-27 PROCEDURE — 84132 ASSAY OF SERUM POTASSIUM: CPT | Performed by: STUDENT IN AN ORGANIZED HEALTH CARE EDUCATION/TRAINING PROGRAM

## 2022-03-27 PROCEDURE — 88307 TISSUE EXAM BY PATHOLOGIST: CPT | Mod: TC | Performed by: STUDENT IN AN ORGANIZED HEALTH CARE EDUCATION/TRAINING PROGRAM

## 2022-03-27 PROCEDURE — 250N000013 HC RX MED GY IP 250 OP 250 PS 637

## 2022-03-27 PROCEDURE — C9290 INJ, BUPIVACAINE LIPOSOME: HCPCS | Performed by: ANESTHESIOLOGY

## 2022-03-27 PROCEDURE — 93010 ELECTROCARDIOGRAM REPORT: CPT | Performed by: INTERNAL MEDICINE

## 2022-03-27 PROCEDURE — 250N000011 HC RX IP 250 OP 636: Performed by: STUDENT IN AN ORGANIZED HEALTH CARE EDUCATION/TRAINING PROGRAM

## 2022-03-27 PROCEDURE — 99232 SBSQ HOSP IP/OBS MODERATE 35: CPT | Performed by: OBSTETRICS & GYNECOLOGY

## 2022-03-27 PROCEDURE — 93005 ELECTROCARDIOGRAM TRACING: CPT

## 2022-03-27 PROCEDURE — 85730 THROMBOPLASTIN TIME PARTIAL: CPT | Performed by: OBSTETRICS & GYNECOLOGY

## 2022-03-27 PROCEDURE — 36415 COLL VENOUS BLD VENIPUNCTURE: CPT | Performed by: OBSTETRICS & GYNECOLOGY

## 2022-03-27 PROCEDURE — 88307 TISSUE EXAM BY PATHOLOGIST: CPT | Mod: 26 | Performed by: PATHOLOGY

## 2022-03-27 RX ORDER — SIMETHICONE 80 MG
80 TABLET,CHEWABLE ORAL 4 TIMES DAILY PRN
Status: DISCONTINUED | OUTPATIENT
Start: 2022-03-27 | End: 2022-03-29 | Stop reason: HOSPADM

## 2022-03-27 RX ORDER — FENTANYL CITRATE 50 UG/ML
10 INJECTION, SOLUTION INTRAMUSCULAR; INTRAVENOUS ONCE
Status: DISCONTINUED | OUTPATIENT
Start: 2022-03-27 | End: 2022-03-27 | Stop reason: HOSPADM

## 2022-03-27 RX ORDER — LIDOCAINE 40 MG/G
CREAM TOPICAL
Status: DISCONTINUED | OUTPATIENT
Start: 2022-03-27 | End: 2022-03-27 | Stop reason: HOSPADM

## 2022-03-27 RX ORDER — EPHEDRINE SULFATE 50 MG/ML
INJECTION, SOLUTION INTRAMUSCULAR; INTRAVENOUS; SUBCUTANEOUS PRN
Status: DISCONTINUED | OUTPATIENT
Start: 2022-03-27 | End: 2022-03-27

## 2022-03-27 RX ORDER — ONDANSETRON 2 MG/ML
INJECTION INTRAMUSCULAR; INTRAVENOUS PRN
Status: DISCONTINUED | OUTPATIENT
Start: 2022-03-27 | End: 2022-03-27

## 2022-03-27 RX ORDER — SODIUM CHLORIDE, SODIUM LACTATE, POTASSIUM CHLORIDE, CALCIUM CHLORIDE 600; 310; 30; 20 MG/100ML; MG/100ML; MG/100ML; MG/100ML
INJECTION, SOLUTION INTRAVENOUS CONTINUOUS
Status: DISCONTINUED | OUTPATIENT
Start: 2022-03-27 | End: 2022-03-27 | Stop reason: HOSPADM

## 2022-03-27 RX ORDER — MISOPROSTOL 200 UG/1
TABLET ORAL
Status: DISCONTINUED
Start: 2022-03-27 | End: 2022-03-27 | Stop reason: HOSPADM

## 2022-03-27 RX ORDER — NALOXONE HYDROCHLORIDE 0.4 MG/ML
0.4 INJECTION, SOLUTION INTRAMUSCULAR; INTRAVENOUS; SUBCUTANEOUS
Status: DISCONTINUED | OUTPATIENT
Start: 2022-03-27 | End: 2022-03-27

## 2022-03-27 RX ORDER — TRANEXAMIC ACID 10 MG/ML
INJECTION, SOLUTION INTRAVENOUS
Status: DISCONTINUED
Start: 2022-03-27 | End: 2022-03-27 | Stop reason: HOSPADM

## 2022-03-27 RX ORDER — LABETALOL HYDROCHLORIDE 5 MG/ML
20-80 INJECTION, SOLUTION INTRAVENOUS EVERY 10 MIN PRN
Status: DISCONTINUED | OUTPATIENT
Start: 2022-03-27 | End: 2022-03-29 | Stop reason: HOSPADM

## 2022-03-27 RX ORDER — IBUPROFEN 800 MG/1
800 TABLET, FILM COATED ORAL EVERY 6 HOURS
Status: DISCONTINUED | OUTPATIENT
Start: 2022-03-28 | End: 2022-03-29 | Stop reason: HOSPADM

## 2022-03-27 RX ORDER — NALOXONE HYDROCHLORIDE 0.4 MG/ML
0.2 INJECTION, SOLUTION INTRAMUSCULAR; INTRAVENOUS; SUBCUTANEOUS
Status: DISCONTINUED | OUTPATIENT
Start: 2022-03-27 | End: 2022-03-27

## 2022-03-27 RX ORDER — CARBOPROST TROMETHAMINE 250 UG/ML
250 INJECTION, SOLUTION INTRAMUSCULAR
Status: DISCONTINUED | OUTPATIENT
Start: 2022-03-27 | End: 2022-03-29 | Stop reason: HOSPADM

## 2022-03-27 RX ORDER — MAGNESIUM SULFATE HEPTAHYDRATE 500 MG/ML
10 INJECTION, SOLUTION INTRAMUSCULAR; INTRAVENOUS
Status: DISCONTINUED | OUTPATIENT
Start: 2022-03-27 | End: 2022-03-29 | Stop reason: HOSPADM

## 2022-03-27 RX ORDER — METHYLERGONOVINE MALEATE 0.2 MG/ML
200 INJECTION INTRAVENOUS
Status: DISCONTINUED | OUTPATIENT
Start: 2022-03-27 | End: 2022-03-27 | Stop reason: HOSPADM

## 2022-03-27 RX ORDER — MISOPROSTOL 200 UG/1
800 TABLET ORAL
Status: DISCONTINUED | OUTPATIENT
Start: 2022-03-27 | End: 2022-03-27 | Stop reason: HOSPADM

## 2022-03-27 RX ORDER — OXYTOCIN/0.9 % SODIUM CHLORIDE 30/500 ML
100-340 PLASTIC BAG, INJECTION (ML) INTRAVENOUS CONTINUOUS PRN
Status: DISCONTINUED | OUTPATIENT
Start: 2022-03-27 | End: 2022-03-29 | Stop reason: HOSPADM

## 2022-03-27 RX ORDER — KETOROLAC TROMETHAMINE 30 MG/ML
30 INJECTION, SOLUTION INTRAMUSCULAR; INTRAVENOUS EVERY 6 HOURS
Status: ACTIVE | OUTPATIENT
Start: 2022-03-28 | End: 2022-03-28

## 2022-03-27 RX ORDER — CEFAZOLIN SODIUM 2 G/100ML
2 INJECTION, SOLUTION INTRAVENOUS
Status: DISCONTINUED | OUTPATIENT
Start: 2022-03-27 | End: 2022-03-27 | Stop reason: HOSPADM

## 2022-03-27 RX ORDER — ONDANSETRON 2 MG/ML
4 INJECTION INTRAMUSCULAR; INTRAVENOUS EVERY 6 HOURS PRN
Status: DISCONTINUED | OUTPATIENT
Start: 2022-03-27 | End: 2022-03-29 | Stop reason: HOSPADM

## 2022-03-27 RX ORDER — MAGNESIUM SULFATE HEPTAHYDRATE 40 MG/ML
4 INJECTION, SOLUTION INTRAVENOUS
Status: DISCONTINUED | OUTPATIENT
Start: 2022-03-27 | End: 2022-03-29 | Stop reason: HOSPADM

## 2022-03-27 RX ORDER — BUPIVACAINE HYDROCHLORIDE 2.5 MG/ML
INJECTION, SOLUTION EPIDURAL; INFILTRATION; INTRACAUDAL PRN
Status: DISCONTINUED | OUTPATIENT
Start: 2022-03-27 | End: 2022-03-27

## 2022-03-27 RX ORDER — ONDANSETRON 2 MG/ML
4 INJECTION INTRAMUSCULAR; INTRAVENOUS EVERY 6 HOURS PRN
Status: DISCONTINUED | OUTPATIENT
Start: 2022-03-27 | End: 2022-03-27 | Stop reason: HOSPADM

## 2022-03-27 RX ORDER — SODIUM CHLORIDE, SODIUM LACTATE, POTASSIUM CHLORIDE, CALCIUM CHLORIDE 600; 310; 30; 20 MG/100ML; MG/100ML; MG/100ML; MG/100ML
10-125 INJECTION, SOLUTION INTRAVENOUS CONTINUOUS
Status: DISCONTINUED | OUTPATIENT
Start: 2022-03-27 | End: 2022-03-29 | Stop reason: HOSPADM

## 2022-03-27 RX ORDER — OXYCODONE HYDROCHLORIDE 5 MG/1
5 TABLET ORAL EVERY 4 HOURS PRN
Status: DISCONTINUED | OUTPATIENT
Start: 2022-03-27 | End: 2022-03-29 | Stop reason: HOSPADM

## 2022-03-27 RX ORDER — NALOXONE HYDROCHLORIDE 0.4 MG/ML
0.4 INJECTION, SOLUTION INTRAMUSCULAR; INTRAVENOUS; SUBCUTANEOUS
Status: DISCONTINUED | OUTPATIENT
Start: 2022-03-27 | End: 2022-03-29 | Stop reason: HOSPADM

## 2022-03-27 RX ORDER — HYDROCORTISONE 2.5 %
CREAM (GRAM) TOPICAL 3 TIMES DAILY PRN
Status: DISCONTINUED | OUTPATIENT
Start: 2022-03-27 | End: 2022-03-29 | Stop reason: HOSPADM

## 2022-03-27 RX ORDER — OXYTOCIN/0.9 % SODIUM CHLORIDE 30/500 ML
PLASTIC BAG, INJECTION (ML) INTRAVENOUS CONTINUOUS PRN
Status: DISCONTINUED | OUTPATIENT
Start: 2022-03-27 | End: 2022-03-27

## 2022-03-27 RX ORDER — TRANEXAMIC ACID 10 MG/ML
1 INJECTION, SOLUTION INTRAVENOUS EVERY 30 MIN PRN
Status: DISCONTINUED | OUTPATIENT
Start: 2022-03-27 | End: 2022-03-29 | Stop reason: HOSPADM

## 2022-03-27 RX ORDER — BUPIVACAINE HYDROCHLORIDE 7.5 MG/ML
INJECTION, SOLUTION INTRASPINAL PRN
Status: DISCONTINUED | OUTPATIENT
Start: 2022-03-27 | End: 2022-03-27

## 2022-03-27 RX ORDER — ONDANSETRON 4 MG/1
4 TABLET, ORALLY DISINTEGRATING ORAL EVERY 6 HOURS PRN
Status: DISCONTINUED | OUTPATIENT
Start: 2022-03-27 | End: 2022-03-27 | Stop reason: HOSPADM

## 2022-03-27 RX ORDER — LIDOCAINE 40 MG/G
CREAM TOPICAL
Status: DISCONTINUED | OUTPATIENT
Start: 2022-03-27 | End: 2022-03-29 | Stop reason: HOSPADM

## 2022-03-27 RX ORDER — MISOPROSTOL 200 UG/1
400 TABLET ORAL
Status: DISCONTINUED | OUTPATIENT
Start: 2022-03-27 | End: 2022-03-29 | Stop reason: HOSPADM

## 2022-03-27 RX ORDER — METHYLERGONOVINE MALEATE 0.2 MG/ML
200 INJECTION INTRAVENOUS
Status: DISCONTINUED | OUTPATIENT
Start: 2022-03-27 | End: 2022-03-29 | Stop reason: HOSPADM

## 2022-03-27 RX ORDER — CARBOPROST TROMETHAMINE 250 UG/ML
250 INJECTION, SOLUTION INTRAMUSCULAR
Status: DISCONTINUED | OUTPATIENT
Start: 2022-03-27 | End: 2022-03-27 | Stop reason: HOSPADM

## 2022-03-27 RX ORDER — PROCHLORPERAZINE MALEATE 10 MG
10 TABLET ORAL EVERY 6 HOURS PRN
Status: DISCONTINUED | OUTPATIENT
Start: 2022-03-27 | End: 2022-03-29 | Stop reason: HOSPADM

## 2022-03-27 RX ORDER — OXYTOCIN 10 [USP'U]/ML
10 INJECTION, SOLUTION INTRAMUSCULAR; INTRAVENOUS
Status: DISCONTINUED | OUTPATIENT
Start: 2022-03-27 | End: 2022-03-27 | Stop reason: HOSPADM

## 2022-03-27 RX ORDER — METOCLOPRAMIDE 10 MG/1
10 TABLET ORAL EVERY 6 HOURS PRN
Status: DISCONTINUED | OUTPATIENT
Start: 2022-03-27 | End: 2022-03-29 | Stop reason: HOSPADM

## 2022-03-27 RX ORDER — OXYTOCIN/0.9 % SODIUM CHLORIDE 30/500 ML
340 PLASTIC BAG, INJECTION (ML) INTRAVENOUS CONTINUOUS PRN
Status: DISCONTINUED | OUTPATIENT
Start: 2022-03-27 | End: 2022-03-29 | Stop reason: HOSPADM

## 2022-03-27 RX ORDER — OXYTOCIN/0.9 % SODIUM CHLORIDE 30/500 ML
340 PLASTIC BAG, INJECTION (ML) INTRAVENOUS CONTINUOUS PRN
Status: DISCONTINUED | OUTPATIENT
Start: 2022-03-27 | End: 2022-03-27 | Stop reason: HOSPADM

## 2022-03-27 RX ORDER — TRANEXAMIC ACID 10 MG/ML
1 INJECTION, SOLUTION INTRAVENOUS EVERY 30 MIN PRN
Status: DISCONTINUED | OUTPATIENT
Start: 2022-03-27 | End: 2022-03-27 | Stop reason: HOSPADM

## 2022-03-27 RX ORDER — NALOXONE HYDROCHLORIDE 0.4 MG/ML
0.2 INJECTION, SOLUTION INTRAMUSCULAR; INTRAVENOUS; SUBCUTANEOUS
Status: DISCONTINUED | OUTPATIENT
Start: 2022-03-27 | End: 2022-03-29 | Stop reason: HOSPADM

## 2022-03-27 RX ORDER — CEFAZOLIN SODIUM 2 G/100ML
2 INJECTION, SOLUTION INTRAVENOUS SEE ADMIN INSTRUCTIONS
Status: DISCONTINUED | OUTPATIENT
Start: 2022-03-27 | End: 2022-03-27 | Stop reason: HOSPADM

## 2022-03-27 RX ORDER — CEFAZOLIN SODIUM 1 G/3ML
INJECTION, POWDER, FOR SOLUTION INTRAMUSCULAR; INTRAVENOUS PRN
Status: DISCONTINUED | OUTPATIENT
Start: 2022-03-27 | End: 2022-03-27

## 2022-03-27 RX ORDER — AMOXICILLIN 250 MG
2 CAPSULE ORAL 2 TIMES DAILY
Status: DISCONTINUED | OUTPATIENT
Start: 2022-03-27 | End: 2022-03-29 | Stop reason: HOSPADM

## 2022-03-27 RX ORDER — CITRIC ACID/SODIUM CITRATE 334-500MG
SOLUTION, ORAL ORAL
Status: COMPLETED
Start: 2022-03-27 | End: 2022-03-27

## 2022-03-27 RX ORDER — MAGNESIUM SULFATE HEPTAHYDRATE 40 MG/ML
2 INJECTION, SOLUTION INTRAVENOUS
Status: DISCONTINUED | OUTPATIENT
Start: 2022-03-27 | End: 2022-03-29 | Stop reason: HOSPADM

## 2022-03-27 RX ORDER — DEXTROSE, SODIUM CHLORIDE, SODIUM LACTATE, POTASSIUM CHLORIDE, AND CALCIUM CHLORIDE 5; .6; .31; .03; .02 G/100ML; G/100ML; G/100ML; G/100ML; G/100ML
INJECTION, SOLUTION INTRAVENOUS CONTINUOUS
Status: DISCONTINUED | OUTPATIENT
Start: 2022-03-27 | End: 2022-03-29 | Stop reason: HOSPADM

## 2022-03-27 RX ORDER — MISOPROSTOL 200 UG/1
800 TABLET ORAL
Status: DISCONTINUED | OUTPATIENT
Start: 2022-03-27 | End: 2022-03-29 | Stop reason: HOSPADM

## 2022-03-27 RX ORDER — FENTANYL CITRATE 50 UG/ML
INJECTION, SOLUTION INTRAMUSCULAR; INTRAVENOUS PRN
Status: DISCONTINUED | OUTPATIENT
Start: 2022-03-27 | End: 2022-03-27

## 2022-03-27 RX ORDER — ACETAMINOPHEN 325 MG/1
975 TABLET ORAL ONCE
Status: COMPLETED | OUTPATIENT
Start: 2022-03-27 | End: 2022-03-27

## 2022-03-27 RX ORDER — FENTANYL CITRATE-0.9 % NACL/PF 10 MCG/ML
100 PLASTIC BAG, INJECTION (ML) INTRAVENOUS EVERY 5 MIN PRN
Status: DISCONTINUED | OUTPATIENT
Start: 2022-03-27 | End: 2022-03-27 | Stop reason: HOSPADM

## 2022-03-27 RX ORDER — MORPHINE SULFATE 1 MG/ML
INJECTION, SOLUTION EPIDURAL; INTRATHECAL; INTRAVENOUS PRN
Status: DISCONTINUED | OUTPATIENT
Start: 2022-03-27 | End: 2022-03-27

## 2022-03-27 RX ORDER — MISOPROSTOL 200 UG/1
400 TABLET ORAL
Status: DISCONTINUED | OUTPATIENT
Start: 2022-03-27 | End: 2022-03-27 | Stop reason: HOSPADM

## 2022-03-27 RX ORDER — MAGNESIUM SULFATE IN WATER 40 MG/ML
2 INJECTION, SOLUTION INTRAVENOUS CONTINUOUS
Status: DISCONTINUED | OUTPATIENT
Start: 2022-03-27 | End: 2022-03-28

## 2022-03-27 RX ORDER — PROCHLORPERAZINE 25 MG
25 SUPPOSITORY, RECTAL RECTAL EVERY 12 HOURS PRN
Status: DISCONTINUED | OUTPATIENT
Start: 2022-03-27 | End: 2022-03-29 | Stop reason: HOSPADM

## 2022-03-27 RX ORDER — MODIFIED LANOLIN
OINTMENT (GRAM) TOPICAL
Status: DISCONTINUED | OUTPATIENT
Start: 2022-03-27 | End: 2022-03-29 | Stop reason: HOSPADM

## 2022-03-27 RX ORDER — HYDRALAZINE HYDROCHLORIDE 20 MG/ML
10 INJECTION INTRAMUSCULAR; INTRAVENOUS
Status: DISCONTINUED | OUTPATIENT
Start: 2022-03-27 | End: 2022-03-29 | Stop reason: HOSPADM

## 2022-03-27 RX ORDER — OXYTOCIN 10 [USP'U]/ML
10 INJECTION, SOLUTION INTRAMUSCULAR; INTRAVENOUS
Status: DISCONTINUED | OUTPATIENT
Start: 2022-03-27 | End: 2022-03-29 | Stop reason: HOSPADM

## 2022-03-27 RX ORDER — METOCLOPRAMIDE HYDROCHLORIDE 5 MG/ML
10 INJECTION INTRAMUSCULAR; INTRAVENOUS EVERY 6 HOURS PRN
Status: DISCONTINUED | OUTPATIENT
Start: 2022-03-27 | End: 2022-03-29 | Stop reason: HOSPADM

## 2022-03-27 RX ORDER — ACETAMINOPHEN 325 MG/1
975 TABLET ORAL EVERY 6 HOURS
Status: DISCONTINUED | OUTPATIENT
Start: 2022-03-27 | End: 2022-03-29 | Stop reason: HOSPADM

## 2022-03-27 RX ORDER — AMOXICILLIN 250 MG
1 CAPSULE ORAL 2 TIMES DAILY
Status: DISCONTINUED | OUTPATIENT
Start: 2022-03-27 | End: 2022-03-29 | Stop reason: HOSPADM

## 2022-03-27 RX ORDER — ONDANSETRON 4 MG/1
4 TABLET, ORALLY DISINTEGRATING ORAL EVERY 6 HOURS PRN
Status: DISCONTINUED | OUTPATIENT
Start: 2022-03-27 | End: 2022-03-29 | Stop reason: HOSPADM

## 2022-03-27 RX ORDER — NALBUPHINE HYDROCHLORIDE 10 MG/ML
2.5-5 INJECTION, SOLUTION INTRAMUSCULAR; INTRAVENOUS; SUBCUTANEOUS EVERY 6 HOURS PRN
Status: DISCONTINUED | OUTPATIENT
Start: 2022-03-27 | End: 2022-03-29 | Stop reason: HOSPADM

## 2022-03-27 RX ORDER — SODIUM CHLORIDE, SODIUM LACTATE, POTASSIUM CHLORIDE, CALCIUM CHLORIDE 600; 310; 30; 20 MG/100ML; MG/100ML; MG/100ML; MG/100ML
INJECTION, SOLUTION INTRAVENOUS CONTINUOUS PRN
Status: DISCONTINUED | OUTPATIENT
Start: 2022-03-27 | End: 2022-03-27

## 2022-03-27 RX ORDER — CITRIC ACID/SODIUM CITRATE 334-500MG
30 SOLUTION, ORAL ORAL
Status: COMPLETED | OUTPATIENT
Start: 2022-03-27 | End: 2022-03-27

## 2022-03-27 RX ORDER — BISACODYL 10 MG
10 SUPPOSITORY, RECTAL RECTAL DAILY PRN
Status: DISCONTINUED | OUTPATIENT
Start: 2022-03-29 | End: 2022-03-29 | Stop reason: HOSPADM

## 2022-03-27 RX ORDER — MORPHINE SULFATE 1 MG/ML
150 INJECTION, SOLUTION EPIDURAL; INTRATHECAL; INTRAVENOUS ONCE
Status: DISCONTINUED | OUTPATIENT
Start: 2022-03-27 | End: 2022-03-27 | Stop reason: HOSPADM

## 2022-03-27 RX ORDER — FENTANYL CITRATE-0.9 % NACL/PF 10 MCG/ML
PLASTIC BAG, INJECTION (ML) INTRAVENOUS CONTINUOUS PRN
Status: DISCONTINUED | OUTPATIENT
Start: 2022-03-27 | End: 2022-03-27

## 2022-03-27 RX ORDER — CALCIUM GLUCONATE 94 MG/ML
1 INJECTION, SOLUTION INTRAVENOUS
Status: DISCONTINUED | OUTPATIENT
Start: 2022-03-27 | End: 2022-03-29 | Stop reason: HOSPADM

## 2022-03-27 RX ORDER — LORAZEPAM 2 MG/ML
2 INJECTION INTRAMUSCULAR
Status: DISCONTINUED | OUTPATIENT
Start: 2022-03-27 | End: 2022-03-29 | Stop reason: HOSPADM

## 2022-03-27 RX ADMIN — Medication 10 MG: at 17:38

## 2022-03-27 RX ADMIN — FENTANYL CITRATE 10 MCG: 50 INJECTION, SOLUTION INTRAMUSCULAR; INTRAVENOUS at 17:04

## 2022-03-27 RX ADMIN — Medication 10 MG: at 18:01

## 2022-03-27 RX ADMIN — MAGNESIUM SULFATE 6 G: 500 INJECTION, SOLUTION INTRAMUSCULAR; INTRAVENOUS at 14:01

## 2022-03-27 RX ADMIN — MAGNESIUM SULFATE IN WATER 2 G/HR: 40 INJECTION, SOLUTION INTRAVENOUS at 14:36

## 2022-03-27 RX ADMIN — CEFAZOLIN 2 G: 1 INJECTION, POWDER, FOR SOLUTION INTRAMUSCULAR; INTRAVENOUS at 17:58

## 2022-03-27 RX ADMIN — ONDANSETRON 4 MG: 2 INJECTION INTRAMUSCULAR; INTRAVENOUS at 17:43

## 2022-03-27 RX ADMIN — SODIUM CITRATE AND CITRIC ACID MONOHYDRATE 30 ML: 500; 334 SOLUTION ORAL at 16:46

## 2022-03-27 RX ADMIN — SODIUM CHLORIDE, POTASSIUM CHLORIDE, SODIUM LACTATE AND CALCIUM CHLORIDE: 600; 310; 30; 20 INJECTION, SOLUTION INTRAVENOUS at 16:56

## 2022-03-27 RX ADMIN — Medication 10 MG: at 17:43

## 2022-03-27 RX ADMIN — OXYTOCIN-SODIUM CHLORIDE 0.9% IV SOLN 30 UNIT/500ML 600 ML/HR: 30-0.9/5 SOLUTION at 17:33

## 2022-03-27 RX ADMIN — SODIUM CHLORIDE, POTASSIUM CHLORIDE, SODIUM LACTATE AND CALCIUM CHLORIDE 75 ML/HR: 600; 310; 30; 20 INJECTION, SOLUTION INTRAVENOUS at 13:36

## 2022-03-27 RX ADMIN — CEFAZOLIN 2 G: 1 INJECTION, POWDER, FOR SOLUTION INTRAMUSCULAR; INTRAVENOUS at 17:01

## 2022-03-27 RX ADMIN — PRENATAL VITAMINS-IRON FUMARATE 27 MG IRON-FOLIC ACID 0.8 MG TABLET 1 TABLET: at 08:23

## 2022-03-27 RX ADMIN — TRANEXAMIC ACID 1 G: 1 INJECTION, SOLUTION INTRAVENOUS at 17:34

## 2022-03-27 RX ADMIN — Medication 30 ML: at 16:46

## 2022-03-27 RX ADMIN — BUPIVACAINE HYDROCHLORIDE IN DEXTROSE 1.6 ML: 7.5 INJECTION, SOLUTION SUBARACHNOID at 17:04

## 2022-03-27 RX ADMIN — ASPIRIN 81 MG: 81 TABLET, COATED ORAL at 08:23

## 2022-03-27 RX ADMIN — SODIUM CHLORIDE, POTASSIUM CHLORIDE, SODIUM LACTATE AND CALCIUM CHLORIDE: 600; 310; 30; 20 INJECTION, SOLUTION INTRAVENOUS at 16:53

## 2022-03-27 RX ADMIN — MORPHINE SULFATE 0.15 MG: 1 INJECTION EPIDURAL; INTRATHECAL; INTRAVENOUS at 17:04

## 2022-03-27 RX ADMIN — ACETAMINOPHEN 975 MG: 325 TABLET, FILM COATED ORAL at 16:45

## 2022-03-27 RX ADMIN — SODIUM CHLORIDE, POTASSIUM CHLORIDE, SODIUM LACTATE AND CALCIUM CHLORIDE 50 ML/HR: 600; 310; 30; 20 INJECTION, SOLUTION INTRAVENOUS at 19:33

## 2022-03-27 RX ADMIN — TRANEXAMIC ACID 1 G: 1 INJECTION, SOLUTION INTRAVENOUS at 17:59

## 2022-03-27 RX ADMIN — BUPIVACAINE HYDROCHLORIDE 20 ML: 2.5 INJECTION, SOLUTION EPIDURAL; INFILTRATION; INTRACAUDAL at 18:38

## 2022-03-27 RX ADMIN — Medication 50 MCG/MIN: at 17:02

## 2022-03-27 RX ADMIN — BUPIVACAINE 20 ML: 13.3 INJECTION, SUSPENSION, LIPOSOMAL INFILTRATION at 18:38

## 2022-03-27 NOTE — PLAN OF CARE
Data: Blood pressures within parameters. Denied s/s of pre e. See lab results for AM results. Fetal assessment see flowsheet.  Interventions: Monitor blood pressures and indicators of pre-eclampsia, BP WDL this shift. Per provider able to suspend vitals overnight to promote period of rest. Continue uterine/fetal assessment TID. Activity level Regular activity. Preventive measures include Medications, Positioning and Frequent voiding. Encourage active range of motion and frequent position changes. Encouraged to use SCD's in room and educated on.  Plan: Continue expectant management. Observe for and notify care provider of indicators of worsening pre-eclampsia or signs of fetal/maternal compromise.

## 2022-03-27 NOTE — BRIEF OP NOTE
[facility]    Brief Operative Note    Pre-operative diagnosis: PreE w/ SF, severe FGR  Post-operative diagnosis Same as pre-operative diagnosis    Procedure: Procedure(s):   SECTION  Surgeon: Surgeon(s) and Role:     * Sivan Moore MD - Primary     * Nivia Paula MD - Resident - Assisting  Anesthesia: Spinal   Estimated Blood Loss: 1500 ml  IVF: 1200  UOP: 50 ml    Drains: None  Specimens:   ID Type Source Tests Collected by Time Destination   A :  Cord blood, arterial Umbilical Cord OR DOCUMENTATION ONLY Xochitl Brown RN 3/27/2022  5:45 PM    B :  Cord blood, venous Umbilical Cord OR DOCUMENTATION ONLY Xochitl Brown RN 3/27/2022  5:46 PM    C :  Placenta Placenta SEE PROVIDERS ORDERS Xochitl Brown RN 3/27/2022  5:46 PM    D :  Tissue Umbilical Cord SEE PROVIDERS ORDERS Xochitl Brown RN 3/27/2022  5:47 PM      Findings:   live born male infant, transverse, back up position. Delivered via breech manuevers. Last right sided cyst. Adhesive disease in the pelvis. T'd incision..  Complications: None.  Implants: * No implants in log *

## 2022-03-27 NOTE — ANESTHESIA CARE TRANSFER NOTE
Patient: Pao Pereira    Procedure: Procedure(s):   SECTION       Diagnosis: * No pre-op diagnosis entered *  Diagnosis Additional Information: No value filed.    Anesthesia Type:   Spinal     Note:    Oropharynx: oropharynx clear of all foreign objects and spontaneously breathing  Level of Consciousness: awake  Oxygen Supplementation: room air    Independent Airway: airway patency satisfactory and stable  Dentition: dentition unchanged  Vital Signs Stable: post-procedure vital signs reviewed and stable  Report to RN Given: handoff report given  Patient transferred to: PACU  Comments: .Anesthesia Care Transfer Note    Patient: Pao Pereira    Transferred to: PACU    Patient vital signs: stable    Airway: none    Monitors applied, VSS.  Patient awake and comfortable, breathing spontaneously.  Report given to RN with transfer of care.        Unique Barillas CRNA  3/27/2022  6:49 PM    Handoff Report: Identifed the Patient, Identified the Reponsible Provider, Reviewed the pertinent medical history, Discussed the surgical course, Reviewed Intra-OP anesthesia mangement and issues during anesthesia, Set expectations for post-procedure period and Allowed opportunity for questions and acknowledgement of understanding      Vitals:  Vitals Value Taken Time   BP     Temp     Pulse     Resp     SpO2         Electronically Signed By: KADEEM Canas CRNA  2022  6:49 PM

## 2022-03-27 NOTE — ANESTHESIA POSTPROCEDURE EVALUATION
Patient: Pao Pereira    Procedure: Procedure(s):   SECTION       Anesthesia Type:  Spinal    Note:  Disposition: Inpatient   Postop Pain Control: Uneventful            Sign Out: Well controlled pain   PONV: No   Neuro/Psych: Uneventful            Sign Out: Acceptable/Baseline neuro status   Airway/Respiratory: Uneventful            Sign Out: Acceptable/Baseline resp. status   CV/Hemodynamics: Uneventful            Sign Out: Acceptable CV status; No obvious hypovolemia; No obvious fluid overload   Other NRE: NONE   DID A NON-ROUTINE EVENT OCCUR? No           Last vitals:  Vitals Value Taken Time   BP     Temp     Pulse     Resp     SpO2         Electronically Signed By: Unique Mccormack MD  2022  6:58 PM

## 2022-03-27 NOTE — PLAN OF CARE
Pt amenable to c/s. Consents signed upon admission, TAP block consent signed. Discussed prep procedures, magnesium sulfate, clipping, wipes, SCDs, OR routines and staff for same, PACU routines, breastpumping and lactation resources in NICU, location of NICU in relation to postpartum. Report to BRANT Tipton RN who assumed care.

## 2022-03-27 NOTE — PLAN OF CARE
Goal Outcome Evaluation:                    Pt reports new onset of visual changes. Blurry vision when reading menu and ongoing since that time. Also feeling mild chest tightness similar but much less severe than earlier in admission. No headache or epigastric pain. Discussed today's labs, MD to round after safety meeting this a.m. and continued monitoring for s/s worsening pre-e.

## 2022-03-27 NOTE — ANESTHESIA PREPROCEDURE EVALUATION
Anesthesia Pre-Procedure Evaluation    Patient: Pao Pereira   MRN: [id number] : 1980        Procedure : Procedure(s):   SECTION          Past Medical History:   Diagnosis Date     Asthma     no hospitalizations     Dyshidrotic hand dermatitis     no issues currently     Endometriosis      History of hypothyroidism     no meds currently      Past Surgical History:   Procedure Laterality Date     BIOPSY      GYN     DILATION AND CURETTAGE, HYSTEROSCOPY DIAGNOSTIC, COMBINED N/A 2019    Procedure: HYSTEROSCOPY, DIAGNOSTIC, WITH DILATION AND CURETTAGE OF UTERUS;  Surgeon: Jd Epps MD;  Location:  OR     LASER CO2 LAPAROSCOPY DIAGNOSTIC, LYSIS ADHESIONS, COMBINED N/A 2019    Procedure: LAPAROSCOPY, DIAGNOSTIC, WITH LYSIS OF ADHESIONS USING CO2 LASER, BILATERAL OVARIAN CYSTOSCOPY;  Surgeon: Jd Epps MD;  Location:  OR      Allergies   Allergen Reactions     Dust Mites      Nickel Rash      Social History     Tobacco Use     Smoking status: Never Smoker     Smokeless tobacco: Never Used   Substance Use Topics     Alcohol use: Not Currently     Alcohol/week: 1.0 standard drink     Comment: Very rarely      Wt Readings from Last 1 Encounters:   22 106.2 kg (234 lb 1 oz)        Anesthesia Evaluation   Pt has had prior anesthetic. Type: General.    No history of anesthetic complications       ROS/MED HX  ENT/Pulmonary:     (+) asthma     Neurologic:  - neg neurologic ROS     Cardiovascular:     (+) hypertension----- (-) murmur and wheezes   METS/Exercise Tolerance:     Hematologic: Comments: thrombocytopenia   (-) anemia   Musculoskeletal:       GI/Hepatic: Comment: Elevated LFTs    (+) hepatitis (AST/ALT elevated)     Renal/Genitourinary:       Endo:     (+) Obesity,     Psychiatric/Substance Use:       Infectious Disease:       Malignancy:       Other:   - Pre-eclampsia w/ severe features  - Intermittent maternal bradycardia  - severe fetal growth restriction          Physical Exam    Airway        Mallampati: II   TM distance: > 3 FB   Neck ROM: full   Mouth opening: > 3 cm    Respiratory Devices and Support         Dental  no notable dental history         Cardiovascular   cardiovascular exam normal      (-) no murmur    Pulmonary   pulmonary exam normal        (-) no wheezes        OUTSIDE LABS:  CBC:   Lab Results   Component Value Date    WBC 12.4 (H) 03/27/2022    WBC 8.1 03/27/2022    HGB 12.9 03/27/2022    HGB 11.8 03/27/2022    HCT 38.3 03/27/2022    HCT 34.4 (L) 03/27/2022     03/27/2022     (L) 03/27/2022     BMP:   Lab Results   Component Value Date     03/27/2022     03/26/2022    POTASSIUM 4.2 03/27/2022    POTASSIUM 4.5 03/26/2022    CHLORIDE 111 (H) 03/27/2022    CHLORIDE 111 (H) 03/26/2022    CO2 22 03/27/2022    CO2 21 03/26/2022    BUN 16 03/27/2022    BUN 16 03/26/2022    CR 0.60 03/27/2022    CR 0.61 03/27/2022    GLC 77 03/27/2022     (H) 03/26/2022     COAGS:   Lab Results   Component Value Date    INR 0.97 03/08/2022     POC: No results found for: BGM, HCG, HCGS  HEPATIC:   Lab Results   Component Value Date    ALBUMIN 2.2 (L) 03/27/2022    PROTTOTAL 5.7 (L) 03/27/2022     (H) 03/27/2022     (H) 03/27/2022    ALKPHOS 134 03/27/2022    BILITOTAL 0.2 03/27/2022     OTHER:   Lab Results   Component Value Date    A1C 4.6 03/08/2022    THELMA 10.7 (H) 03/27/2022    MAG 5.5 (H) 03/09/2022    TSH 0.93 03/08/2022       Anesthesia Plan    ASA Status:  3, emergent    NPO Status:  ELEVATED Aspiration Risk/Unknown    Anesthesia Type: Spinal.   Induction: N/a.   Maintenance: N/A.   Techniques and Equipment:     - Lines/Monitors: 2nd IV     - Drips/Meds: Phenylephrine     Consents    Anesthesia Plan(s) and associated risks, benefits, and realistic alternatives discussed. Questions answered and patient/representative(s) expressed understanding.    - Discussed:     - Discussed with:  Patient      - Extended  Intubation/Ventilatory Support Discussed: No.      - Patient is DNR/DNI Status: No    Use of blood products discussed: Yes.     - Discussed with: Patient.     - Consented: consented to blood products            Reason for refusal: other.     Postoperative Care    Pain management: intrathecal morphine, Peripheral nerve block (Single Shot).   PONV prophylaxis: Ondansetron (or other 5HT-3)     Comments:    Other Comments: Pt with preeclampsia with severe features, worsening LFTs, thrombocytopenia, IUGR for C/S with spinal anesthesia and TAP block.            Lion Pettit MD

## 2022-03-27 NOTE — PLAN OF CARE
"Called to room by pt who was c/o \"not feeling well\". Increased chest tightness, mild HA she attributes to crying (now improved), denies epigastric pain or visual changes. Reflexes WNL, RR WNL, elevated BP, under threshold for antihypertensives upon recheck. MD updated, labs drawn, EKG ordered and completed. Exp mgt.  "

## 2022-03-27 NOTE — PROGRESS NOTES
MFM Progress Note    To room to discuss worsening LFTs. Patient continues to have chest pressure and tightness. All other labs and EKG reassuring.     Discussed that given worsening symptoms and lab values, would recommend proceeding with delivery. Discussed route of delivery. Offered to scan patient to assess presentation but given acutely worsening symptoms and likely long induction, would recommend proceeding with  delivery. Patient and partner and amenable and okay with  delivery.     Will plan to start magnesium (6 gram load) now and proceed with delivery. NICU for delivery.    Discussed with Dr. Meek.    Nivia Paula MD  Obstetrics and Gynecology, PGY-3  22 1:31 PM    Physician Attestation   I, Michelle Meek MD, saw this patient with the resident and agree with the resident/fellow's findings and plan of care as documented in the note.      I personally reviewed vital signs, medications, labs and imaging.    Key findings: LFTs now greater than twice the upper limit of normal with worsening epigastric/chest pain. This pain likely referred pain from the liver. Recommend delivery at this time. We discussed the risks, benefits and alternatives of vaginal delivery or  section. Given the acute change in status and progressive nature of preeclampsia recommend  section given remote from delivery and  gestation. The couple are in agreement to proceed. Will add coagulation studies given diagnosis and plan for neuraxial anesthesia. Start magnesium sulfate for neuroprotection.    Michelle Meek MD  Date of Service (when I saw the patient): 22

## 2022-03-27 NOTE — PROGRESS NOTES
MFM Progress Note    Called to patient room to due patient feeling unwell. Patient reports worsening chest tightness and back pain. No headache or vision change.     BP at time of exam 170s/110s. VS otherwise reassuring. Sat appropriate on RA.     Reviewed new symptoms as well as high blood pressures with patient. Will follow BP monitoring protocol and treat as needed with IV antihypertensives. Will obtain STAT HELLP labs, troponin and EKG. Will make patient NPO for now pending symptoms. Will place on continuous monitoring.     Nivia Paula MD  Obstetrics and Gynecology, PGY-3  03/27/22 12:28 PM

## 2022-03-27 NOTE — OP NOTE
[facility]   OPERATIVE NOTE:  SECTION     Surgery Date:  2022  Surgeon(s): Sivan Moore MD  Assistants:  Nivia Paula MD PGY-3; Ahkil Ortiz MD, PGY1    Preoperative Diagnoses:  -  at 30w2d  - Worsening preeclampsia w/ severe features  - Severe fetal growth restriction  - Obesity    Postoperative diagnoses:  -  at 30w2d, now delivered  - Postpartum hemorrhage    Procedure performed:  Primary classical  section via Pfannenstiel skin incision with triple layer uterine closure    Anesthesia:  Spinal with duramorph  Est Blood Loss (mL): 1500 mL  Fluid replacement: 1200 mL crystalloid.   Specimens: Cord blood, cord gas, placenta  Complications: None      Operative findings:   1. Single, liveborn male infant at 1732 hours on 3/27/2022. Apgars and weight pending  Fetal presentation: transverse, back up. Amniotic fluid: clear.    2. Arterial Cord pH pending  3. Placenta intact with 3 vessel cord.     4. Normal appearing uterus. Filmy adhesions between ovaries and uterus. Large right sided ovarian cyst. Simple appearning  5.  No abdominal wall adhesions    Indication: Pao Pereira is a 41 year old, , who was admitted at 27w3d by  11w0d ultrasound for preeclampsia w/o SF. Pregnancy complicated by severe FGR and asthma. On HD#21, patient had worsening chest pain and AST/ALT. Discussed route of delivery. Offered to scan patient to assess presentation but given acutely worsening symptoms and likely long induction, would recommend proceeding with  delivery. The risks, benefits, and alternatives of  delivery were explained and the patient agreed to proceed.     Procedure details:  After obtaining informed consent, the patient was taken to the operating room. She received 2 grams of Ancef pre op prior to the skin incision. She was placed in the dorsal supine position with a leftward tilt and prepped and draped in the usual sterile  fashion.     Following test of adequate spinal anesthesia, the abdomen was entered through a transverse skin incision. The skin incision was made sharply and carried through the subcutaneous tissue to the fascia.  Fascia was incised in the midline and extended laterally with the Fuentes scissors.  The superior margin of the fascial incision was grasped with Kocher clamps and dissected from the underlying muscle with sharp and blunt dissecton, which was then repeated at the lower margin of the fascial incision.  The muscle was  in the midline.      The peritoneum was entered bluntly and the opening extended by digital dissection with care to avoid the bladder. A bladder blade was placed. The lower segment of the uterus was opened sharply in a transverse fashion and extended with digital pressure. The infant's head was noted to be in the transverse position. A leg was delivered however the fetus remained in the upper segment and was not easily accessible. The decision was then was the T the incision by cutting into the active segment with bandage scissors. The fetus was then delivered with standard breech maneuvers with delivery of the legs, hips, thorax, arms and head. It was placed in a sterile bag. The cord was doubly clamped and cut and the infant was handed off to the waiting NICU staff. A segment of the cord was cut and set aside for cord gases if needed.     The placenta was expressed.  The uterus was exteriorized from the abdomen and cleared of all clots and debris.  The uterus was massaged and was noted to be firm.  Pitocin was given through the running IV. She received 1 gram of IV TXA. With vigorous massage as well as administration of pitocin, good uterine tone was achieved. The T'd portion of the hysterotomy was repaired with 2 layers of 0-vicryl suture in a running locked fashion. The horizontal portion of the hysterotomy was repaired with 0 Vicryl in a running locked fashion. A 2nd layer of  0-monocryl was  used to imbricate the incision and good hemostasis was achieved. The serosal of the vertical portion was closed using 3-0 Vicryl. The bladder flap was inspected and found to be hemostatic.  The posterior cul-de-sac was suctioned and the uterus was returned to the abdomen. Her TXA and antibiotics were re dosed.     The bilateral pericolic gutters were suctioned.  The hysterotomy was again inspected. An area of oozing was noted at the mid line and was controlled with a single figure of X using 0-monocryl. The hysterotomy was then found to be hemostatic.  The abdominal wall was examined and also found to be hemostatic.  The fascia was closed with a running suture of 0-Vicryl.  Subcutaneous tissue was irrigated. Areas that were not hemostatic were controlled with cautery. The subcutaneous tissue was greater than 3cm in depth and was re-approximated with 3-0 plain gut. The skin was closed with 4-0 monocryl. The patient tolerated the procedure well and was taken to the recovery room in stable condition. All sponge, needle and instrument counts were correct x2.     Dr. Moore was present for the entire procedure.     Nivia Paula MD  Obstetrics and Gyncology, PGY-3  March 27, 2022. 6:36 PM    Staff:  I was scrubbed and present for entire case and agree w/ above note.    Sivan Moore MD

## 2022-03-27 NOTE — PROGRESS NOTES
[facility]  Progress Note  DOS: 3/26/2022    Subjective:     Pao notes that she is having mild epigastric discomfort, which she does not describe as painful. She felt this discomfort earlier in the admission more significantly, but then it resolved. She also is having blurring of her vision, but is still able to see clearly enough to read. No headache. No contractions. Baby is active.    Objective:  Vitals:    22 2318 22 0555 22 0600 22 0955   BP:  131/75  (!) 156/85   BP Location:  Left arm  Left arm   Patient Position:  Semi-Gibbs's  Sitting   Cuff Size:  Adult Large  Adult Large   Pulse: 54      Resp:  18     Temp:  98.1  F (36.7  C)  97.4  F (36.3  C)   TempSrc:  Oral  Oral   SpO2:       Weight:   106.2 kg (234 lb 1 oz)        Gen: Appears well, in NAD  CV:  Regular rate and rhythm, well perfused  Resp: CTAB  Extr:         trace edema  Neuro:    1+patellar reflexes    FHT: Baseline 140, mod variability, + accels, one spontaneous decel to 120's for one minute  Macy: quiet    Recent Labs:  Component      Latest Ref Rng & Units 3/27/2022   Sodium      133 - 144 mmol/L 138   Potassium      3.4 - 5.3 mmol/L 4.2   Chloride      94 - 109 mmol/L 111 (H)   Carbon Dioxide      20 - 32 mmol/L 22   Anion Gap      3 - 14 mmol/L 5   Urea Nitrogen      7 - 30 mg/dL 16   Creatinine      0.52 - 1.04 mg/dL 0.61   Calcium      8.5 - 10.1 mg/dL 10.7 (H)   Glucose      70 - 99 mg/dL 77   Alkaline Phosphatase      40 - 150 U/L 134   AST      0 - 45 U/L 29   ALT      0 - 50 U/L 59 (H)   Protein Total      6.8 - 8.8 g/dL 5.7 (L)   Albumin      3.4 - 5.0 g/dL 2.2 (L)   Bilirubin Total      0.2 - 1.3 mg/dL 0.2   GFR Estimate      >60 mL/min/1.73m2 >90   Platelet - 146    Assessment and Plan:  Pao Pereira is a 41 year old  at 30w2d by 11w0d US, now HD#21 for preeclampsia with severe features. Pregnancy is otherwise complicated by severe FGR and asthma.     Pre-eclampsia   with Severe Features (BP)  - New onset of mild epigastric discomfort today concerning for possible worsening disease. Will monitor very closely.  - Continue serial BP monitoring. Alert with sustained SBP >160 and/or DBP >110 for acute treatment with antihypertensives.  Plan to avoid labetalol at this time due to maternal bradycardia.  - HELLP labs with worsening trend for LFTs and platelets on 3/24 which is why BMZ course was started. AST/ALT trend now improved, AST 46>73>36>27>29, ALT 64>98>77>64>59, plts trending back up, 186>129>114>133>149>146. Cr stable 0.61.  - Repeat labs at 2 pm today given symptoms and if stable plan to continue daily labs  - Continue Serial weights and strict I/O  - Mg started during induction and for 24h postpartum  - Delivery recommended if worsening labs (platelets <100k, AST or ALT twice the upper limit of normal, or creatinine >1.1 mg/dL), severe epigastric pain or headaches, or if blood pressures become difficult to control.    Intermittent maternal Bradycardia  - Previous documentation from prior hospitalization and outside pregnancy of HR in 40-50s.  - Patient asymptomatic.   - EKG WNL other than bradycardic  - S/p cardiology, normal increase in HR with light activity, no further workup at this time - recommends reconsult if symptoms or other concerns identified    Severe FGR  Fetal well being  - growth 3/24- sFGR <1%, VIKAS nl, UAD with increased placental resistance with continuous forward flow  - Continue twice weekly Dopplers and BPP, last 10/10 on 3/24/22  - Continue TID monitoring and prn.   - S/p Betamethasone (3/7-3/8) and rescue course (3/25-)  - S/p NICU consult    PNC  - Labs: Rh pos, Rubella immune. GBS negative (3/7).  Elevated GCT, passed GTT. Other prenatal labs wnl.  - Immunizations: S/p Tdap, COVID vaccines  - Genetics: low risk NIPT  - PPx: Lovenox   - Other: SW involved to assist with separation from children/long hospitalization.                Seen and  staffed with Dr. Gaviota Meek MD  Date of Service (when I saw the patient): 03/27/22    Time Spent on this Encounter   I spent 25 minutes on the unit/floor managing the care of Pao Pereira. Over 50% of my time was spent on the following:   - Counseling the patient and/or family regarding: diagnostic results, prognosis and recommended follow-up  - Coordination of care with the: nurse    See above    Michelle Meek MD

## 2022-03-28 LAB
ALT SERPL W P-5'-P-CCNC: 162 U/L (ref 0–50)
AST SERPL W P-5'-P-CCNC: 109 U/L (ref 0–45)
ATRIAL RATE - MUSE: 52 BPM
CREAT SERPL-MCNC: 0.62 MG/DL (ref 0.52–1.04)
DIASTOLIC BLOOD PRESSURE - MUSE: NORMAL MMHG
ERYTHROCYTE [DISTWIDTH] IN BLOOD BY AUTOMATED COUNT: 14.3 % (ref 10–15)
GFR SERPL CREATININE-BSD FRML MDRD: >90 ML/MIN/1.73M2
HCT VFR BLD AUTO: 28.2 % (ref 35–47)
HGB BLD-MCNC: 9.5 G/DL (ref 11.7–15.7)
INTERPRETATION ECG - MUSE: NORMAL
MCH RBC QN AUTO: 32.2 PG (ref 26.5–33)
MCHC RBC AUTO-ENTMCNC: 33.7 G/DL (ref 31.5–36.5)
MCV RBC AUTO: 96 FL (ref 78–100)
P AXIS - MUSE: 32 DEGREES
PLATELET # BLD AUTO: 91 10E3/UL (ref 150–450)
PR INTERVAL - MUSE: 142 MS
QRS DURATION - MUSE: 84 MS
QT - MUSE: 412 MS
QTC - MUSE: 383 MS
R AXIS - MUSE: 64 DEGREES
RBC # BLD AUTO: 2.95 10E6/UL (ref 3.8–5.2)
SARS-COV-2 RNA RESP QL NAA+PROBE: NEGATIVE
SYSTOLIC BLOOD PRESSURE - MUSE: NORMAL MMHG
T AXIS - MUSE: 34 DEGREES
VENTRICULAR RATE- MUSE: 52 BPM
WBC # BLD AUTO: 10.8 10E3/UL (ref 4–11)

## 2022-03-28 PROCEDURE — 250N000013 HC RX MED GY IP 250 OP 250 PS 637: Performed by: STUDENT IN AN ORGANIZED HEALTH CARE EDUCATION/TRAINING PROGRAM

## 2022-03-28 PROCEDURE — 82565 ASSAY OF CREATININE: CPT | Performed by: STUDENT IN AN ORGANIZED HEALTH CARE EDUCATION/TRAINING PROGRAM

## 2022-03-28 PROCEDURE — 85027 COMPLETE CBC AUTOMATED: CPT | Performed by: STUDENT IN AN ORGANIZED HEALTH CARE EDUCATION/TRAINING PROGRAM

## 2022-03-28 PROCEDURE — 250N000011 HC RX IP 250 OP 636: Performed by: STUDENT IN AN ORGANIZED HEALTH CARE EDUCATION/TRAINING PROGRAM

## 2022-03-28 PROCEDURE — 84460 ALANINE AMINO (ALT) (SGPT): CPT | Performed by: STUDENT IN AN ORGANIZED HEALTH CARE EDUCATION/TRAINING PROGRAM

## 2022-03-28 PROCEDURE — U0005 INFEC AGEN DETEC AMPLI PROBE: HCPCS | Performed by: OBSTETRICS & GYNECOLOGY

## 2022-03-28 PROCEDURE — 250N000013 HC RX MED GY IP 250 OP 250 PS 637: Performed by: OBSTETRICS & GYNECOLOGY

## 2022-03-28 PROCEDURE — 120N000002 HC R&B MED SURG/OB UMMC

## 2022-03-28 PROCEDURE — 258N000003 HC RX IP 258 OP 636: Performed by: STUDENT IN AN ORGANIZED HEALTH CARE EDUCATION/TRAINING PROGRAM

## 2022-03-28 PROCEDURE — 36415 COLL VENOUS BLD VENIPUNCTURE: CPT | Performed by: STUDENT IN AN ORGANIZED HEALTH CARE EDUCATION/TRAINING PROGRAM

## 2022-03-28 PROCEDURE — 84450 TRANSFERASE (AST) (SGOT): CPT | Performed by: STUDENT IN AN ORGANIZED HEALTH CARE EDUCATION/TRAINING PROGRAM

## 2022-03-28 RX ORDER — IBUPROFEN 600 MG/1
600 TABLET, FILM COATED ORAL EVERY 6 HOURS PRN
Status: DISCONTINUED | OUTPATIENT
Start: 2022-03-28 | End: 2022-03-29 | Stop reason: HOSPADM

## 2022-03-28 RX ADMIN — SIMETHICONE 80 MG: 80 TABLET, CHEWABLE ORAL at 13:03

## 2022-03-28 RX ADMIN — SENNOSIDES AND DOCUSATE SODIUM 2 TABLET: 50; 8.6 TABLET ORAL at 08:07

## 2022-03-28 RX ADMIN — MAGNESIUM SULFATE IN WATER 2 G/HR: 40 INJECTION, SOLUTION INTRAVENOUS at 08:10

## 2022-03-28 RX ADMIN — ACETAMINOPHEN 975 MG: 325 TABLET, FILM COATED ORAL at 17:52

## 2022-03-28 RX ADMIN — IBUPROFEN 600 MG: 600 TABLET ORAL at 12:57

## 2022-03-28 RX ADMIN — SODIUM CHLORIDE, POTASSIUM CHLORIDE, SODIUM LACTATE AND CALCIUM CHLORIDE 75 ML/HR: 600; 310; 30; 20 INJECTION, SOLUTION INTRAVENOUS at 09:24

## 2022-03-28 RX ADMIN — SIMETHICONE 80 MG: 80 TABLET, CHEWABLE ORAL at 17:52

## 2022-03-28 RX ADMIN — ACETAMINOPHEN 975 MG: 325 TABLET, FILM COATED ORAL at 04:38

## 2022-03-28 RX ADMIN — IBUPROFEN 800 MG: 800 TABLET, FILM COATED ORAL at 18:53

## 2022-03-28 RX ADMIN — ACETAMINOPHEN 975 MG: 325 TABLET, FILM COATED ORAL at 11:07

## 2022-03-28 RX ADMIN — ACETAMINOPHEN 975 MG: 325 TABLET, FILM COATED ORAL at 23:37

## 2022-03-28 NOTE — PROGRESS NOTES
CLINICAL NUTRITION SERVICES - REASSESSMENT NOTE     Nutrition Prescription    RECOMMENDATIONS FOR MDs/PROVIDERS TO ORDER:  None today    Malnutrition Status:    Patient does not meet two of the established criteria necessary for diagnosing malnutrition    Recommendations already ordered by Registered Dietitian (RD):  Nutrition education     Future/Additional Recommendations:  Monitor intakes and wt trends  Snacks/supplements as needed/per PO      EVALUATION OF THE PROGRESS TOWARD GOALS   Diet: Regular  Intake: 100% of meals per flowsheet        NEW FINDINGS   Pt now s/p c section (3/27). Stated her diet was advanced this morning and her appetite has remained normal. Pt stated she has continued to monitor her intakes of CHO, she has been off insulin since. 3/11. Pt with questions regarding nutrition needs while breast feeding. Discussed calorie, protein, fluid and micronutrient needs.     03/28/22 105.3 kg (232 lb 1.6 oz)   03/18/22 105.3 kg (232 lb 3.2 oz)   03/11/22 107 kg (235 lb 12.8 oz)   03/08/22 107.5 kg (237 lb)   03/04/22 109.8 kg (242 lb)   08/31/21 97.7 kg (215 lb)-prepregnancy wt   01/30/20 80.7 kg (178 lb)   01/09/20 80.7 kg (178 lb)   12/31/19 82.6 kg (182 lb)   12/10/19 83.5 kg (184 lb)   11/25/19 85.3 kg (188 lb)   04/23/19 92.3 kg (203 lb 6.4 oz)   07/16/18 96.6 kg (213 lb)    Meds: reviewed  Labs: reviewed    MALNUTRITION  % Intake: No decreased intake noted  % Weight Loss: None noted  Subcutaneous Fat Loss: None observed  Muscle Loss: None observed  Fluid Accumulation/Edema: None noted  Malnutrition Diagnosis: Patient does not meet two of the established criteria necessary for diagnosing malnutrition    Previous Goals   Patient to consume % of nutritionally adequate meal trays TID, or the equivalent with supplements/snacks.  Evaluation: Met    Previous Nutrition Diagnosis  Predicted inadequate nutrient intake related to LOS/menu fatigue   Evaluation: No change    CURRENT NUTRITION  DIAGNOSIS  Predicted inadequate nutrient intake related to LOS/menu fatigue     INTERVENTIONS  Implementation  Nutrition education: Discussed nutrition needs during lactation/breast feeding     Goals  Patient to consume % of nutritionally adequate meal trays TID, or the equivalent with supplements/snacks.    Monitoring/Evaluation  Progress toward goals will be monitored and evaluated per protocol.    Velvet Mcbride MS, RD, LDN  Unit Pager [phone]

## 2022-03-28 NOTE — PROGRESS NOTES
OB/GYN Postpartum Mag Check Note    S: Pt feeling well. Had an episode of palpitation overnight that now is resolved. Pain currently tolerable with tylenol while resting in bed. Didn't eat yet but drinking water. Morneo in place. Pumping. Saw infant in NICU last night and was doing okay. Denies chest pain, shortness of breath, headaches, visual changes, n/v, epigastric or RUQ pain.    O:   Vitals:    22 2331 22 0031 22 0131 22 0240   BP: 121/85 115/83 125/83 118/77   BP Location:  Left arm     Patient Position:  Semi-Gibbs's     Cuff Size:  Adult Large     Pulse: 75 74 72 64   Resp: 16 17 16 16   Temp:  97.9  F (36.6  C)     TempSrc: Oral Oral     SpO2: 97% 97% 97% 95%   Weight:         General: Lying in bed, in NAD  CV: RRR  Resp: lungs CTAB  Abdomen: soft, nontender  Incision: Dressing in place, no shadowing  Extremities: Trace edema in BLE, patellar reflexes 2+ b/l, no clonus    UOP 3250cc // 500cc, additional 400cc in moreno bag clear urine    Wt 106.2kg (3/27) pre-delivery > AM pending     Labs:   ALT 86>>27>>136>247> 232  AST 29>109>238> 211  plt 129>114>133>>152>104> 95  Cr 0.60>0.69>0.59    A: Ms. Pao Pereira is a 41 year old  POD#1 s/p PCCS for pre-eclampsia with severe features with new transaminitis and chest pain. Chest pain now resolved. She is recovering appropriately postpartum.     Plan:    #Pre-eclampsia with SF  - BP Normal range overnight  - S/p IV labetalol x1 preop   - Transaminitis, downtrending. Repeat HELLP labs today pending.  - UOP adequate   - Strict I/Os, daily weights  - S/p 6g Mag load > 2g/hr, no signs mag toxicity  - Chest pain: s/p nl EKG, trop, now improved    #Postoperative Care  Heme: Hgb 12.9 > QBL 1500 (TXA x2)> 11.2>10.8> AM pending. Postpartum hemorrhage with acute blood loss anemia.   - Coags wnl   Pain: Tylenol, toradol 24 hours > ibuprofen, oxycodone prn   : Mroeno in place overnight, remove this AM  GI: Bowel regimen scheduled   Feeding:  Pumping  Contraception: Did not discuss  PNC: Rh pos, rubella immune  PPx Lovenox to start today after Hgb, plt      Dispo: POD#2-3 pending goals    Roseann Dempsey MD PGY3  Obstetrics & Gynecology  03/28/22

## 2022-03-28 NOTE — PROVIDER NOTIFICATION
03/28/22 0150   Provider Notification   Provider Name/Title G3   Method of Notification Electronic Page   Request Evaluate-Remote   Notification Reason Other  (clariffy if Toradol will be given or not yet .)   Resident called writer; if Hemoglobin result  is at least 9 and above then given Toradol. Awaiting next lab draw.

## 2022-03-28 NOTE — PROGRESS NOTES
Post  Anesthesia Follow Up Note    Patient: Pao Pereira    Patient location: Postpartum floor.      Procedure(s) Performed:  Procedure(s):   SECTION    Anesthesia type: Spinal Block    Subjective:     Pain is well controlled. Patient denies additional concern at this time.      Additional ROS:  She does not complain of pruritis, lower extremity weakness and paresthesia, difficulties breathing, nausea or vomiting. She tolerates a regular diet. Costa in place.     Objective:  Vitals stable    Gen: comfortable in bed  Pulm: normal work of breathing  CV: warm, well perfused  Skin: no back bruising, bleeding, or pain on palpation    Last Vitals: /79 (BP Location: Left arm, Patient Position: Semi-Gibbs's, Cuff Size: Adult Large)   Pulse 59   Temp 36.8  C (98.3  F) (Oral)   Resp 17   Wt 105.3 kg (232 lb 1.6 oz)   SpO2 97%   Breastfeeding Yes   BMI 43.14 kg/m      Assessment and plan:   Pao Pereira is a 41 year old female  POD #1 s/p   and single shot TAP nerve block injections. There is no evidence of adverse side effects associated with spinal and nerve block injections.    Pain is well controlled.    Thank you for including us in the care for this patient.    Lion Pettit MD  CA-1  Anesthesia

## 2022-03-28 NOTE — PLAN OF CARE
Patient arrived to [facility] unit via zoom cart at 2126 ,with belongings, accompanied by spouse/ significant other. Received report from  Xochitl HERNÁNDEZ . Unit and room orientation started. Call light given; no concerns present at this time. Continue with plan of care.

## 2022-03-28 NOTE — PLAN OF CARE
Is on Magnesium Sulfate infusing at 50 ml/hr via right PIV, no s/sx of toxicity noted, reflexes +2, clonus 0. Lower abdominal incision with CDI dressing. Fundus firm at 1 cm below. Scant lochia. Costa in place with adequate UOP. Baby is NICU, assisted with breast pumping. Ambulates this morning, steady on feet, no dizziness. Tylenol given, with tolerable pain at this time. Toradol on hold, awaiting lab draw for recent Hemoglobin. Continue plan of care.   Vitals:    03/28/22 0031 03/28/22 0131 03/28/22 0240 03/28/22 0607   BP: 115/83 125/83 118/77 120/79   BP Location: Left arm   Left arm   Patient Position: Semi-Gibbs's   Semi-Gibbs's   Cuff Size: Adult Large   Adult Large   Pulse: 74 72 64 59   Resp: 17 16 16 17   Temp: 97.9  F (36.6  C)   98.3  F (36.8  C)   TempSrc: Oral   Oral   SpO2: 97% 97% 95% 97%   Weight:    105.3 kg (232 lb 1.6 oz)

## 2022-03-28 NOTE — PROVIDER NOTIFICATION
03/28/22 1209   Provider Notification   Provider Name/Title G2   Method of Notification Electronic Page   Request Evaluate-Remote   Notification Reason Lab Results  (Hgb 9.5)   FYI: Hgb = 9.5, do you want me to give her toradol? Thanks  Spoke with Dr Simmons and she wants pt to get ibuprofen instead and which order was placed.

## 2022-03-28 NOTE — PROGRESS NOTES
03/25/22 1530   Child Life   Intervention Supportive Check In;Family Support;Sibling Support   Family Support Comment Supportive check-in and listening was provided to patient at bedside. Updates re: anticipated plan of care were provided to CCLS. In anticipation for possible delivery next week, patient asked appropriate questions about how to best support son and daughter's understanding of potential NICU hospitalization. Resources provided.   Sibling Support Comment In My Heart book and additional art supplies was provided to support ongoing processing of emotions and connection using shared journals.     Passport Petra photos were provided via e-mail to support family's anticipatory guidance for NICU admission; CCLS modeled age-appropriate language to use with children to support ongoing understanding.   Outcomes/Follow Up Continue to Follow/Support

## 2022-03-28 NOTE — PLAN OF CARE
Data: Pao Pereira transferred to [facility] via cart at 2045 and stopped at NICU to visit baby on the way. RN remained with her while in NICU until transfer to [facility] at 2130.    Action: Receiving unit notified of transfer: Yes. Patient and family notified of room change. Report given to BETTY Cervantes at 2000 on the phone. Belongings sent to receiving unit. Accompanied by Registered Nurse. Oriented patient to surroundings. Call light within reach. ID bands double-checked with receiving RN.    Response: Patient tolerated transfer and is stable.

## 2022-03-28 NOTE — PROGRESS NOTES
OB/GYN Postpartum Mag Check Note    S: Pt feeling well. Denies chest pain, shortness of breath, headaches, visual changes, n/v, epigastric or RUQ pain.    O:   Vitals:    22 2200 22 2229 22 2331 22 0031   BP: 124/80 122/82 121/85 115/83   BP Location:    Left arm   Patient Position:    Semi-Gibbs's   Cuff Size:    Adult Large   Pulse: 86 83 75 74   Resp: 18 18 16 17   Temp: 98.1  F (36.7  C) 98.1  F (36.7  C)  97.9  F (36.6  C)   TempSrc: Oral Oral Oral Oral   SpO2: 97% 96% 97% 97%   Weight:         General: Lying in bed, in NAD  CV: RRR  Resp: lungs CTAB  Abdomen: soft, nontender  Incision: Dressing in place  Extremities: Trace edema in BLE, patellar reflexes 2+ b/l, no clonus    UOP 125mL/h past 4h    Wt 106.2kg (3/27) pre-delivery > AM pending     Labs:   ALT 86>>27>>136>247> 232  AST 29>109>238> 211  plt 129>114>133>>152>104> 95  Cr 0.60>0.69>0.59    A: Ms. Pao Pereira is a 41 year old  POD#1 s/p PCCS for pre-eclampsia with severe features with new transaminitis and chest pain. She is recovering appropriately postpartum.     Plan:    #Pre-eclampsia with SF  - BP Normal range  - S/p IV labetalol x1 preop   - Transaminitis, downtrending. Repeat pending.  - UOP adequate   - Strict I/Os, daily weights  - S/p 6g Mag load > 2g/hr, no signs mag toxicity  - Chest pain: s/p nl EKG, trop, now improved    #Postoperative Care  - continue routine care    Virginia Salazar MD  ObGyn Resident, PGY-2  2022 1:31 AM

## 2022-03-28 NOTE — PLAN OF CARE
Goal Outcome Evaluation:     Patient is stable, BP are within normal ranges. Denies any headache, shortness of breath, blurry vision and RUQ pain. Moving well in the room with no dizziness and light headedness. Voiding spontaneously. I and O monitored and recorded. Pumping for baby in NICU. Visited baby one time today.  Spouse at bedside and supportive. Will continue with plan of care.

## 2022-03-28 NOTE — LACTATION NOTE
"This note was copied from a baby's chart.  D:  I met with Pao. She is currently on Mg infusion but feels well and requests lactation information.  She states she is normally in good health other than endometriosis, takes no medications other than postpartum tylenol, ibuprofen and oxycodone ordered PRN for pain; she has no history of breast/chest surgery or trauma.  Her medical record indicates a history of hypothyroid which she states occurred only in pregnancy with her first two children and is not a current problem. This baby is her third child; she  her first two into their toddler years. She has already started to pump.   I:  I gave her a folder of introductory materials, reviewed physiology of colostrum and milk production, pumping guidelines, and I gave her a log and encouraged her to use it.  I explained how to access the videos \"Hand Expression\" and \"Maximizing Milk Production\"; as well as other helpful books and websites.  We discussed hands-on pumping techniques and usefulness of a hands-free pumping bra; I gave her belly band pumping bra.  We discussed skin to skin holding and how to reach breastfeeding goals.  We talked about medications during breastfeeding.  She verbalized understanding. She will check pump coverage through her insurance company.    A:  Mom has information she needs to initiate her supply.   P:  Will continue to provide lactation support.  Jael Hernandez, RNC, IBCLC             "

## 2022-03-28 NOTE — PROGRESS NOTES
OB/GYN Postpartum Note    S: Pao Pereira is feeling good after her CS. Chest pain is totally gone. Denies chest pain, shortness of breath, headaches, visual changes, swelling. Pain is minimal currently, denies N/V. Going to visit baby in NICU now.       O:   Vitals:    22 1500 22 1530 22 1915 22 1930   BP:  133/72 135/85 126/84   BP Location:  Left arm     Patient Position:  Semi-Gibbs's     Cuff Size:       Pulse:   87 96   Resp:       Temp: (!) 32  F (0  C) 98.4  F (36.9  C)     TempSrc:  Oral     SpO2:  98% 98% 97%   Weight:         General: Lying in bed, in NAD  CV: regular rate  Resp: nonlabored breathing  Abdomen: soft, nontender, nondistended. Fundus firm at umbilicus. No RUQ pain  Incision: Dressing in place  Extremities: Trace edema in BLE, patellar reflexes 2+ b/l    UOP 100cc over 2 hours    Wt 106.2kg (3/27) pre-delivery > AM pending     Labs:   ALT 86>>27>>136>247  AST 29>109>238  plt 129>114>133>>152>104  Cr 0.60>0.69    A: Ms. Pao Pereira is a 41 year old  POD#1 s/p PCCS for pre-eclampsia with severe features with new transaminitis and chest pain. She is recovering appropriately postpartum.     Plan:    #Postoperative Care  Heme: Hgb 12.9 > QBL 1500 (TXA x2)> 11.2  - Coags wnl   Pain: Tylenol, toradol 24 hours > ibuprofen, oxycodone prn   : Costa in place overnight  GI: Bowel regimen scheduled   Feeding: Not yet started  Contraception: Did not discuss  PNC: Rh pos, rubella immune    #Pre-eclampsia with SF  - BP Normal range  - S/p IV labetalol x1 preop   - Transaminitis, repeat HELLP labs increasing, repeat in 4 hours  - UOP adequate   - Strict I/Os, daily weights  - S/p 6g Mag load > 2g/hr, no signs mag toxicity  - Chest pain: s/p nl EKG, trop, now improved       Roseann Dempsey MD PGY3  Obstetrics & Gynecology  22

## 2022-03-29 VITALS
BODY MASS INDEX: 43.13 KG/M2 | DIASTOLIC BLOOD PRESSURE: 68 MMHG | WEIGHT: 232 LBS | OXYGEN SATURATION: 98 % | SYSTOLIC BLOOD PRESSURE: 122 MMHG | HEART RATE: 74 BPM | TEMPERATURE: 98 F | RESPIRATION RATE: 16 BRPM

## 2022-03-29 LAB
ALT SERPL W P-5'-P-CCNC: 127 U/L (ref 0–50)
AST SERPL W P-5'-P-CCNC: 47 U/L (ref 0–45)
CREAT SERPL-MCNC: 0.72 MG/DL (ref 0.52–1.04)
ERYTHROCYTE [DISTWIDTH] IN BLOOD BY AUTOMATED COUNT: 14.5 % (ref 10–15)
GFR SERPL CREATININE-BSD FRML MDRD: >90 ML/MIN/1.73M2
HCT VFR BLD AUTO: 27.4 % (ref 35–47)
HGB BLD-MCNC: 9.1 G/DL (ref 11.7–15.7)
MCH RBC QN AUTO: 31.4 PG (ref 26.5–33)
MCHC RBC AUTO-ENTMCNC: 33.2 G/DL (ref 31.5–36.5)
MCV RBC AUTO: 95 FL (ref 78–100)
PLATELET # BLD AUTO: 116 10E3/UL (ref 150–450)
RBC # BLD AUTO: 2.9 10E6/UL (ref 3.8–5.2)
WBC # BLD AUTO: 10.6 10E3/UL (ref 4–11)

## 2022-03-29 PROCEDURE — 85014 HEMATOCRIT: CPT | Performed by: STUDENT IN AN ORGANIZED HEALTH CARE EDUCATION/TRAINING PROGRAM

## 2022-03-29 PROCEDURE — 84450 TRANSFERASE (AST) (SGOT): CPT | Performed by: STUDENT IN AN ORGANIZED HEALTH CARE EDUCATION/TRAINING PROGRAM

## 2022-03-29 PROCEDURE — 84460 ALANINE AMINO (ALT) (SGPT): CPT | Performed by: STUDENT IN AN ORGANIZED HEALTH CARE EDUCATION/TRAINING PROGRAM

## 2022-03-29 PROCEDURE — 82565 ASSAY OF CREATININE: CPT | Performed by: STUDENT IN AN ORGANIZED HEALTH CARE EDUCATION/TRAINING PROGRAM

## 2022-03-29 PROCEDURE — 250N000013 HC RX MED GY IP 250 OP 250 PS 637: Performed by: STUDENT IN AN ORGANIZED HEALTH CARE EDUCATION/TRAINING PROGRAM

## 2022-03-29 PROCEDURE — 36415 COLL VENOUS BLD VENIPUNCTURE: CPT | Performed by: STUDENT IN AN ORGANIZED HEALTH CARE EDUCATION/TRAINING PROGRAM

## 2022-03-29 RX ORDER — OXYCODONE HYDROCHLORIDE 5 MG/1
5 TABLET ORAL EVERY 4 HOURS PRN
Qty: 8 TABLET | Refills: 0 | Status: SHIPPED | OUTPATIENT
Start: 2022-03-29 | End: 2022-04-15

## 2022-03-29 RX ORDER — FERROUS SULFATE 325(65) MG
325 TABLET ORAL
Qty: 60 TABLET | Refills: 0 | Status: SHIPPED | OUTPATIENT
Start: 2022-03-29 | End: 2022-05-10

## 2022-03-29 RX ORDER — AMOXICILLIN 250 MG
1 CAPSULE ORAL DAILY
Qty: 100 TABLET | Refills: 0 | Status: SHIPPED | OUTPATIENT
Start: 2022-03-29 | End: 2022-05-10

## 2022-03-29 RX ORDER — ACETAMINOPHEN 325 MG/1
650 TABLET ORAL EVERY 6 HOURS PRN
Qty: 100 TABLET | Refills: 0 | Status: SHIPPED | OUTPATIENT
Start: 2022-03-29 | End: 2022-04-15

## 2022-03-29 RX ORDER — IBUPROFEN 600 MG/1
600 TABLET, FILM COATED ORAL EVERY 6 HOURS PRN
Qty: 60 TABLET | Refills: 0 | Status: SHIPPED | OUTPATIENT
Start: 2022-03-29 | End: 2022-04-15

## 2022-03-29 RX ADMIN — IBUPROFEN 800 MG: 800 TABLET, FILM COATED ORAL at 04:14

## 2022-03-29 RX ADMIN — ACETAMINOPHEN 975 MG: 325 TABLET, FILM COATED ORAL at 07:58

## 2022-03-29 NOTE — PLAN OF CARE
Goal Outcome Evaluation:    Plan of Care Reviewed With: patient   Data: VSS and postpartum checks WNL. Patient eating and drinking normally. Patient able to empty bladder independently and up ambulating. Patient performing self care and able to see infant in NICU. Fundus at 2 cm below U and firm  without massage.  lochia scant and  no blood clots.   Action: Patient taking  Tylenol and Ibuprofen for pain and pain tolerable. Encouraged patient to pump  feed every 2 - 3 hours. Patient education done( education record).   Response: Patient participating in infant's care by visiting infant in NICU.  Plan: Continue with the plan of cares and discharge later today.

## 2022-03-29 NOTE — PLAN OF CARE
[id number]  Data: Patient in NICU since 2045 and did not return prior to end of shift. Vitals and assessment due at 2200 and this RN PRINCESS. Patient eating and drinking normally. Patient able to empty bladder independently and is up ambulating. No apparent signs of infection. Patient performing self cares and is pumping independently for infant in NICU.  Action: No pain medicine given this evening. Patient stated she was comfortable.  Response: Patient down in NICU visiting infant. Support persons present.   Plan: Continue with plan of care.

## 2022-03-29 NOTE — PLAN OF CARE
Goal Outcome Evaluation:    Plan of Care Reviewed With: patient      Discharge instructions read and explained to patient, all questions answered. Medications and pump given to patient.sheets signed. Iv removed.  Never shake baby and postpartum depression videos were discussed and reviewed with patient. Teach back done with patient as TV was not functioning properly. Patient discharged to home with all belongings.

## 2022-03-29 NOTE — PLAN OF CARE
Post Mag- denies sx. VSS on RA. Ambulates independently. Fundus firm at 2 cm below umbilicus. Lochia scant. Voids spontaneously. Passing flatus, denies nausea, declined Senna due to loose BM 1x. Minimal incisional pain managed with Tylenol and Ibuprofen. Wound dressing c/d/i. Frequent breast pumping encouraged. Continue plan of care.     Vitals:    03/28/22 1451 03/28/22 1752 03/28/22 2333 03/29/22 0409   BP: 131/81 123/78 131/81 115/73   BP Location:   Left arm Left arm   Patient Position:   Semi-Gibbs's Semi-Gibbs's   Cuff Size:   Adult Large Adult Large   Pulse: 67 71 77 64   Resp: 16 16 16 17   Temp: 98.4  F (36.9  C) 98.3  F (36.8  C) 98.5  F (36.9  C) 98.3  F (36.8  C)   TempSrc: Oral Oral Oral Oral   SpO2:       Weight:

## 2022-03-29 NOTE — PROGRESS NOTES
Received order for SW to see.  Primary SW,  Ivory Crocker is out of the office today but will return tomorrow. Psychosocial assessment was previously completed.  See SW notes for details.    Brief visit with Pao this morning to offer congratulations on baby's birth.  She verbalizes readiness to discharge.  She denies new needs or concerns.  She has primary SW direct contact information for any needs that may arise.      TOM Contreras [facility]  Clinical   Maternal Child Health  Phone:  [phone]  Pager:  [phone]

## 2022-03-29 NOTE — PROGRESS NOTES
OB/GYN Postpartum Note    S: Pt feeling well. Pain well controlled with ibuprofen and tylenol. She is ambulating without issue, been to NICU 2 times yesterday. She is voiding spontaneously and passing gas. Tolerating PO. Pumping. Bleeding similar to a period. Denies chest pain, shortness of breath, headaches, visual changes, n/v, epigastric or RUQ pain. Feeling ready for discahrge to home today as she misses her other children after prolonged antepartum admission.     O:   Vitals:    22 2333 22 0409 22 0655 22 0756   BP: 131/81 115/73  122/68   BP Location: Left arm Left arm  Right arm   Patient Position: Semi-Gibbs's Semi-Gibbs's     Cuff Size: Adult Large Adult Large  Adult Regular   Pulse: 77 64  74   Resp: 16 17  16   Temp: 98.5  F (36.9  C) 98.3  F (36.8  C)  98  F (36.7  C)   TempSrc: Oral Oral  Oral   SpO2:       Weight:   105.2 kg (232 lb)      General: Lying in bed, in NAD  CV: RRR  Resp: lungs CTAB  Abdomen: soft, nontender  Incision: Dressing removed, steri strips in place, incision C/D/I  Extremities: 1+ edema in BLE    UOP 3450cc // 900cc    Wt 106.2kg (3/27) pre-delivery > 105.3kg (3/28)> AM pending    Labs:   ALT 86>>27>>136>247> 232> 162  AST 29>109>238> 211> 109  plt 129>114>133>>152>104> 95> 91  Cr 0.60>0.69>0.59    A: Ms. Pao Pereira is a 41 year old  POD#2 s/p PCCS for pre-eclampsia with severe features with new transaminitis and chest pain. Chest pain now resolved. She is recovering appropriately postpartum. Meeting goals for discharge and stable for discharge to home.     Plan:    #Pre-eclampsia with SF  - BP Normal range overnight  - S/p IV labetalol x1 preop   - Transaminitis, downtrending. Repeat HELLP labs today pending.   - UOP adequate   - Strict I/Os, daily weights  - S/p 24 hrs Mag  - Chest pain: s/p nl EKG, trop, now improved    #Postoperative Care  Heme: Hgb 12.9 > QBL 1500 (TXA x2)> 11.2>10.8> 9.5. Postpartum hemorrhage with acute blood loss  anemia.   - Coags wnl   Pain: Tylenol, toradol 24 hours > ibuprofen, oxycodone prn   : S/p moreno, voiding  GI: Bowel regimen scheduled   Feeding: Pumping  Contraception: Discussed options, considering condoms vs POPs  PNC: Rh pos, rubella immune    Dispo: POD#2-3 pending BP control, stabilized HELLP labs    Roseann Dempsey MD PGY3  Obstetrics & Gynecology    AM Labs: , AST 47, Hgb 9.1, Plt 116  Appreciate Dr. Dempsey's note above, patient also seen and examined by me. Morning labs continue to show improvement.  I agree with the note above. Discharge home today with close follow-up.  Colette Moreno MD  03/28/22

## 2022-03-31 ENCOUNTER — TELEPHONE (OUTPATIENT)
Dept: OBGYN | Facility: CLINIC | Age: 42
End: 2022-03-31
Payer: COMMERCIAL

## 2022-03-31 NOTE — TELEPHONE ENCOUNTER
[facility] Call Center    Phone Message    May a detailed message be left on voicemail: yes     Reason for Call: Other: . pt is calling, she stated she needs a 1 week BP check, please call pt to schedule, unsure if it should be a nurse visit, or appt with provider, thank you    Action Taken: Message routed to:  Other: s     Travel Screening: Not Applicable

## 2022-04-04 ENCOUNTER — ALLIED HEALTH/NURSE VISIT (OUTPATIENT)
Dept: OBGYN | Facility: CLINIC | Age: 42
End: 2022-04-04
Attending: OBSTETRICS & GYNECOLOGY
Payer: COMMERCIAL

## 2022-04-04 VITALS — SYSTOLIC BLOOD PRESSURE: 116 MMHG | DIASTOLIC BLOOD PRESSURE: 82 MMHG

## 2022-04-04 DIAGNOSIS — O16.9 ELEVATED BLOOD PRESSURE AFFECTING PREGNANCY, ANTEPARTUM: Primary | ICD-10-CM

## 2022-04-04 PROCEDURE — G0463 HOSPITAL OUTPT CLINIC VISIT: HCPCS

## 2022-04-04 NOTE — NURSING NOTE
Average blood pressure 121 86  No signs or symptoms of elevated blood pressure  scheduled visit next week for two week pp visit .  Will call with any questions or concerns,

## 2022-04-06 ENCOUNTER — LACTATION ENCOUNTER (OUTPATIENT)
Age: 42
End: 2022-04-06
Payer: COMMERCIAL

## 2022-04-06 ENCOUNTER — LAB (OUTPATIENT)
Dept: LAB | Facility: CLINIC | Age: 42
End: 2022-04-06
Payer: COMMERCIAL

## 2022-04-06 DIAGNOSIS — O92.79 LACTATION DISORDER, DELIVERED: Primary | ICD-10-CM

## 2022-04-06 DIAGNOSIS — O92.79 LACTATION DISORDER, DELIVERED: ICD-10-CM

## 2022-04-06 LAB
B-HCG SERPL-ACNC: 18 IU/L (ref 0–5)
PROLACTIN SERPL-MCNC: 27 UG/L (ref 3–27)
T4 FREE SERPL-MCNC: 1.2 NG/DL (ref 0.76–1.46)
TSH SERPL DL<=0.005 MIU/L-ACNC: 1.89 MU/L (ref 0.4–4)

## 2022-04-06 PROCEDURE — 84443 ASSAY THYROID STIM HORMONE: CPT

## 2022-04-06 PROCEDURE — 84702 CHORIONIC GONADOTROPIN TEST: CPT

## 2022-04-06 PROCEDURE — 84146 ASSAY OF PROLACTIN: CPT

## 2022-04-06 PROCEDURE — 84270 ASSAY OF SEX HORMONE GLOBUL: CPT

## 2022-04-06 PROCEDURE — 84403 ASSAY OF TOTAL TESTOSTERONE: CPT

## 2022-04-06 PROCEDURE — 36415 COLL VENOUS BLD VENIPUNCTURE: CPT

## 2022-04-06 PROCEDURE — 84439 ASSAY OF FREE THYROXINE: CPT

## 2022-04-06 RX ORDER — METOCLOPRAMIDE 10 MG/1
10 TABLET ORAL 3 TIMES DAILY
Qty: 30 TABLET | Refills: 0 | Status: SHIPPED | OUTPATIENT
Start: 2022-04-06 | End: 2022-04-18

## 2022-04-07 LAB — SHBG SERPL-SCNC: 229 NMOL/L (ref 30–135)

## 2022-04-08 LAB
TESTOST FREE SERPL-MCNC: 0.05 NG/DL
TESTOST SERPL-MCNC: 12 NG/DL (ref 8–60)

## 2022-04-09 ENCOUNTER — LACTATION ENCOUNTER (OUTPATIENT)
Age: 42
End: 2022-04-09
Payer: COMMERCIAL

## 2022-04-09 NOTE — LACTATION NOTE
This note was copied from a baby's chart.  D: Pao started Reglan and states she is tolerating medication. She notes slight increase in supply after 2-3 days on the prescription. She accessed her my chart lab result for testosterone which indicated a WDL result. Provided support and encouragement for her efforts.  A: Mom with lab documented hypoprolactinemia has started prescription galactogogue; full affect on supply still pending.  P: Will continue to provide lactation support.   Jael Hernandez, RNC, IBCLC

## 2022-04-15 ENCOUNTER — OFFICE VISIT (OUTPATIENT)
Dept: OBGYN | Facility: CLINIC | Age: 42
End: 2022-04-15
Attending: MIDWIFE
Payer: COMMERCIAL

## 2022-04-15 ENCOUNTER — APPOINTMENT (OUTPATIENT)
Dept: LAB | Facility: CLINIC | Age: 42
End: 2022-04-15
Attending: MIDWIFE
Payer: COMMERCIAL

## 2022-04-15 VITALS
HEIGHT: 61 IN | HEART RATE: 98 BPM | SYSTOLIC BLOOD PRESSURE: 136 MMHG | DIASTOLIC BLOOD PRESSURE: 82 MMHG | BODY MASS INDEX: 41.89 KG/M2 | WEIGHT: 221.9 LBS

## 2022-04-15 DIAGNOSIS — R74.01 NONSPECIFIC ELEVATION OF LEVELS OF TRANSAMINASE AND LACTIC ACID DEHYDROGENASE (LDH): ICD-10-CM

## 2022-04-15 DIAGNOSIS — R74.02 NONSPECIFIC ELEVATION OF LEVELS OF TRANSAMINASE AND LACTIC ACID DEHYDROGENASE (LDH): ICD-10-CM

## 2022-04-15 LAB
ALT SERPL W P-5'-P-CCNC: 32 U/L (ref 0–50)
AST SERPL W P-5'-P-CCNC: 18 U/L (ref 0–45)

## 2022-04-15 PROCEDURE — 84450 TRANSFERASE (AST) (SGOT): CPT | Performed by: MIDWIFE

## 2022-04-15 PROCEDURE — 36415 COLL VENOUS BLD VENIPUNCTURE: CPT | Performed by: MIDWIFE

## 2022-04-15 PROCEDURE — 84460 ALANINE AMINO (ALT) (SGPT): CPT | Performed by: MIDWIFE

## 2022-04-15 PROCEDURE — G0463 HOSPITAL OUTPT CLINIC VISIT: HCPCS

## 2022-04-15 PROCEDURE — 99207 PR POST PARTUM EXAM: CPT | Performed by: MIDWIFE

## 2022-04-15 RX ORDER — FERROUS SULFATE 324(65)MG
TABLET, DELAYED RELEASE (ENTERIC COATED) ORAL
COMMUNITY
Start: 2022-03-29 | End: 2022-04-15

## 2022-04-15 ASSESSMENT — ANXIETY QUESTIONNAIRES
2. NOT BEING ABLE TO STOP OR CONTROL WORRYING: NOT AT ALL
7. FEELING AFRAID AS IF SOMETHING AWFUL MIGHT HAPPEN: NOT AT ALL
3. WORRYING TOO MUCH ABOUT DIFFERENT THINGS: NOT AT ALL
6. BECOMING EASILY ANNOYED OR IRRITABLE: NOT AT ALL
GAD7 TOTAL SCORE: 0
5. BEING SO RESTLESS THAT IT IS HARD TO SIT STILL: NOT AT ALL
1. FEELING NERVOUS, ANXIOUS, OR ON EDGE: NOT AT ALL

## 2022-04-15 ASSESSMENT — PATIENT HEALTH QUESTIONNAIRE - PHQ9
SUM OF ALL RESPONSES TO PHQ QUESTIONS 1-9: 0
5. POOR APPETITE OR OVEREATING: NOT AT ALL

## 2022-04-15 NOTE — PROGRESS NOTES
"Summary from deivery  Preoperative Diagnoses:  -  at 30w2d  - Worsening preeclampsia w/ severe features  - Severe fetal growth restriction  - Obesity     IUP at 27w3d  Pre-eclampsia with severe features  Obesity  Severe fetal growth restriction  Hypothyroidism  Asthma  Postoperative diagnoses:  -  at 30w2d, now delivered primary classical C/S triple layer uterine closure   - Postpartum hemorrhage     Sinus bradycardia, asymptomatic   Transaminitis  Thrombocytopenia  Postpartum hemorrhage  Acute blood loss anemia   Procedure performed:  Primary classical  section via Pfannenstiel skin incision with triple layer uterine closure   SUBJECTIVE  41 year old  presents for 2 week post partum visit s/p classical C/S  delivery on  3/27/22   for BP  mood check. She is not on any antihypertensive currently  Some LE edema not pitting  Pt has been travelling from home in [place] to see baby in NICU daily  Parents are helping at their home with 7,10 yo kids  Stressful but feels like they are managing and are very grateful baby is at [facility] NICU  Feel very well cared for   Not planning more pregnancies at this time aware recommendation to protect classival scar 18 mos is ideal minimum 12 mos so contraception is important  Hx of infertiility but had a miscarriage and then this pregnancy unplanned     Pt is doing well. Breastfeeding with good latch to both breasts. No nipple pain. Lochia Coping well with support from family and spouse.   On antepartum x 3 wks  Then    Some apnea spells and CPAP  All feeding through NG tube  On reglan to help with supply  Pumping  Up to  30ml per pumping   OBJECTIVE  General- no apparent distress  Breast- soft, NT, nipples intact, no cracking redness or bleeding   Abdomen- Fundus non tender  Incision- CDI,   Extremities- small amount of 2 edema  /86   Pulse 98   Ht 1.549 m (5' 1\")   Wt 100.7 kg (221 lb 14.4 oz)   BMI 41.93 kg/m        ASSESSMENT/PLAN  2 " weeks postpartum s/p C/S delivery  - No signs or symptoms of PPD. Breastfeeding   Considering PP contraception   - RTO in 4weeks for 6 week check and prn if questions or concerns  Leigh Ann Reinisch CNM APRN

## 2022-04-15 NOTE — LETTER
4/15/2022       RE: Pao Pereira   [facility] [address]     Dear Colleague,    Thank you for referring your patient, Pao Pereira, to the [facility] [place] at [facility]. Please see a copy of my visit note below.    Summary from abhishek  Preoperative Diagnoses:  -  at 30w2d  - Worsening preeclampsia w/ severe features  - Severe fetal growth restriction  - Obesity     IUP at 27w3d  Pre-eclampsia with severe features  Obesity  Severe fetal growth restriction  Hypothyroidism  Asthma  Postoperative diagnoses:  -  at 30w2d, now delivered primary classical C/S triple layer uterine closure   - Postpartum hemorrhage     Sinus bradycardia, asymptomatic   Transaminitis  Thrombocytopenia  Postpartum hemorrhage  Acute blood loss anemia   Procedure performed:  Primary classical  section via Pfannenstiel skin incision with triple layer uterine closure   SUBJECTIVE  41 year old  presents for 2 week post partum visit s/p classical C/S  delivery on  3/27/22   for BP  mood check. She is not on any antihypertensive currently  Some LE edema not pitting  Pt has been travelling from home in [place] to see baby in NICU daily  Parents are helping at their home with 7,8 yo kids  Stressful but feels like they are managing and are very grateful baby is at [facility] NICU  Feel very well cared for   Not planning more pregnancies at this time aware recommendation to protect classival scar 18 mos is ideal minimum 12 mos so contraception is important  Hx of infertiility but had a miscarriage and then this pregnancy unplanned     Pt is doing well. Breastfeeding with good latch to both breasts. No nipple pain. Lochia Coping well with support from family and spouse.   On antepartum x 3 wks  Then    Some apnea spells and CPAP  All feeding through NG tube  On reglan to help with supply  Pumping  Up to  30ml per pumping  "  OBJECTIVE  General- no apparent distress  Breast- soft, NT, nipples intact, no cracking redness or bleeding   Abdomen- Fundus non tender  Incision- CDI,   Extremities- small amount of 2 edema  /86   Pulse 98   Ht 1.549 m (5' 1\")   Wt 100.7 kg (221 lb 14.4 oz)   BMI 41.93 kg/m        ASSESSMENT/PLAN  2 weeks postpartum s/p C/S delivery  - No signs or symptoms of PPD. Breastfeeding   Considering PP contraception   - RTO in 4weeks for 6 week check and prn if questions or concerns  Leigh Ann Mauro LOPEZM APRN       "

## 2022-04-16 ASSESSMENT — ANXIETY QUESTIONNAIRES: GAD7 TOTAL SCORE: 0

## 2022-04-18 ENCOUNTER — LACTATION ENCOUNTER (OUTPATIENT)
Age: 42
End: 2022-04-18
Payer: COMMERCIAL

## 2022-04-18 ENCOUNTER — MYC REFILL (OUTPATIENT)
Dept: OBGYN | Facility: CLINIC | Age: 42
End: 2022-04-18
Payer: COMMERCIAL

## 2022-04-18 DIAGNOSIS — O92.79 LACTATION DISORDER, DELIVERED: ICD-10-CM

## 2022-04-18 RX ORDER — METOCLOPRAMIDE 10 MG/1
10 TABLET ORAL 3 TIMES DAILY
Qty: 30 TABLET | Refills: 0 | Status: SHIPPED | OUTPATIENT
Start: 2022-04-18 | End: 2022-05-04

## 2022-04-18 NOTE — LACTATION NOTE
This note was copied from a baby's chart.  D: Pao is logging and notes increase in supply while on Reglan. She went from 4/16 120ml/d to 4/17 150ml/d. Her last dose of Reglan was 4/17, and she denies having any side effects from taking it. Encouraged her to contact her provider to consider a refill, and discussed typical process/duration when taking prescription galactogogue, as well as weaning process. She still has difficulty pumping at night, estimates pumping 5-6x/d. Discussed strategies for night pumping, and advised trying to pump more often during the day (every 2-3 hours) to increase pumping frequency.  A: Pao noticing increase in supply, needs to contact provider for ongoing plan for prescription.  P: Will continue to provide lactation support.   Jael Hernandez, RNC, IBCLC

## 2022-05-04 ENCOUNTER — MYC REFILL (OUTPATIENT)
Dept: OBGYN | Facility: CLINIC | Age: 42
End: 2022-05-04
Payer: COMMERCIAL

## 2022-05-04 DIAGNOSIS — O92.79 LACTATION DISORDER, DELIVERED: ICD-10-CM

## 2022-05-05 RX ORDER — METOCLOPRAMIDE 10 MG/1
10 TABLET ORAL 3 TIMES DAILY
Qty: 30 TABLET | Refills: 0 | Status: SHIPPED | OUTPATIENT
Start: 2022-05-05 | End: 2022-05-22

## 2022-05-10 ENCOUNTER — APPOINTMENT (OUTPATIENT)
Dept: LAB | Facility: CLINIC | Age: 42
End: 2022-05-10
Attending: ADVANCED PRACTICE MIDWIFE
Payer: COMMERCIAL

## 2022-05-10 ENCOUNTER — OFFICE VISIT (OUTPATIENT)
Dept: OBGYN | Facility: CLINIC | Age: 42
End: 2022-05-10
Attending: ADVANCED PRACTICE MIDWIFE
Payer: COMMERCIAL

## 2022-05-10 VITALS
BODY MASS INDEX: 42.1 KG/M2 | HEIGHT: 61 IN | WEIGHT: 223 LBS | SYSTOLIC BLOOD PRESSURE: 144 MMHG | HEART RATE: 60 BPM | DIASTOLIC BLOOD PRESSURE: 86 MMHG

## 2022-05-10 DIAGNOSIS — Z30.011 OCP (ORAL CONTRACEPTIVE PILLS) INITIATION: ICD-10-CM

## 2022-05-10 PROBLEM — O16.9 ELEVATED BLOOD PRESSURE AFFECTING PREGNANCY, ANTEPARTUM: Status: RESOLVED | Noted: 2022-03-05 | Resolved: 2022-05-10

## 2022-05-10 PROBLEM — O09.522 MULTIGRAVIDA OF ADVANCED MATERNAL AGE IN SECOND TRIMESTER: Status: RESOLVED | Noted: 2022-02-22 | Resolved: 2022-05-10

## 2022-05-10 PROBLEM — Z36.89 ENCOUNTER FOR TRIAGE IN PREGNANT PATIENT: Status: RESOLVED | Noted: 2022-03-07 | Resolved: 2022-05-10

## 2022-05-10 PROBLEM — O36.5990 PREGNANCY AFFECTED BY FETAL GROWTH RESTRICTION: Status: RESOLVED | Noted: 2022-02-22 | Resolved: 2022-05-10

## 2022-05-10 PROBLEM — O14.90 PREECLAMPSIA: Status: RESOLVED | Noted: 2022-03-07 | Resolved: 2022-05-10

## 2022-05-10 PROBLEM — O26.00 EXCESSIVE WEIGHT GAIN AFFECTING PREGNANCY: Status: RESOLVED | Noted: 2022-03-05 | Resolved: 2022-05-10

## 2022-05-10 PROBLEM — O99.810 ABNORMAL O'SULLIVAN GLUCOSE CHALLENGE TEST, ANTEPARTUM: Status: RESOLVED | Noted: 2022-03-08 | Resolved: 2022-05-10

## 2022-05-10 LAB
ERYTHROCYTE [DISTWIDTH] IN BLOOD BY AUTOMATED COUNT: 13.3 % (ref 10–15)
HCT VFR BLD AUTO: 39.8 % (ref 35–47)
HGB BLD-MCNC: 12.9 G/DL (ref 11.7–15.7)
MCH RBC QN AUTO: 29.1 PG (ref 26.5–33)
MCHC RBC AUTO-ENTMCNC: 32.4 G/DL (ref 31.5–36.5)
MCV RBC AUTO: 90 FL (ref 78–100)
PLATELET # BLD AUTO: 283 10E3/UL (ref 150–450)
RBC # BLD AUTO: 4.43 10E6/UL (ref 3.8–5.2)
WBC # BLD AUTO: 5.6 10E3/UL (ref 4–11)

## 2022-05-10 PROCEDURE — 36415 COLL VENOUS BLD VENIPUNCTURE: CPT | Performed by: ADVANCED PRACTICE MIDWIFE

## 2022-05-10 PROCEDURE — G0463 HOSPITAL OUTPT CLINIC VISIT: HCPCS | Performed by: ADVANCED PRACTICE MIDWIFE

## 2022-05-10 PROCEDURE — 85027 COMPLETE CBC AUTOMATED: CPT | Performed by: ADVANCED PRACTICE MIDWIFE

## 2022-05-10 RX ORDER — ACETAMINOPHEN AND CODEINE PHOSPHATE 120; 12 MG/5ML; MG/5ML
0.35 SOLUTION ORAL DAILY
Qty: 84 TABLET | Refills: 3 | Status: SHIPPED | OUTPATIENT
Start: 2022-05-10

## 2022-05-10 ASSESSMENT — ANXIETY QUESTIONNAIRES
2. NOT BEING ABLE TO STOP OR CONTROL WORRYING: NOT AT ALL
5. BEING SO RESTLESS THAT IT IS HARD TO SIT STILL: NOT AT ALL
7. FEELING AFRAID AS IF SOMETHING AWFUL MIGHT HAPPEN: NOT AT ALL
6. BECOMING EASILY ANNOYED OR IRRITABLE: NOT AT ALL
1. FEELING NERVOUS, ANXIOUS, OR ON EDGE: NOT AT ALL
3. WORRYING TOO MUCH ABOUT DIFFERENT THINGS: NOT AT ALL
GAD7 TOTAL SCORE: 0

## 2022-05-10 ASSESSMENT — PATIENT HEALTH QUESTIONNAIRE - PHQ9
SUM OF ALL RESPONSES TO PHQ QUESTIONS 1-9: 1
5. POOR APPETITE OR OVEREATING: NOT AT ALL

## 2022-05-10 NOTE — LETTER
5/10/2022       RE: Pao Pereira   [facility]  [place] [address]     Dear Colleague,    Thank you for referring your patient, Pao Pereira, to the [facility] [place] at [facility]. Please see a copy of my visit note below.    Nursing Notes:   Danielle Johnson RN  5/10/2022 10:55 AM  Addendum  SUBJECTIVE:   Pao Pereira is here for her 6-week postpartum checkup.     PHQ-9 score:   Hx of Abuse:  No    Delivery Date: 3/27/22.    Delivering provider:  Sivan Moore MD.    Type of delivery:  .  Classical   Delivery complications: severe pre-eclampsia, Classical  at 30 weeks 2 days  Infant gender:  boy, weight 2 pounds 5 oz.  Feeding Method:  Bottlefed and breastfeeding.  Complications reported with feeding:  Still in NICU.    Bleeding:  Light.  Duration:  A couple weeks.  Menses resumed:  Yes   Bowel/Urinary problems:  No    Contraception Planned:  condoms  She  has not had intercourse since delivery.    ================================================================  Pt reports doing well in postpartum period, in good spirits.  Is 6 weeks postpartum from .  Pregnancy complicated by pre-eclampsia with SF, SIUGR, and  delivery.  General: reports fatigue  Mood: stable, no concerns  Breasts: pumping 5-6 times per day, started breastfeeding within the last couple days. Reports going well, was able to latch and feed for 7 minutes. Denies nipple cracking or soreness.  Abdomen: incision is healing well, no pain  Lochia: lasted about 7 days and then came back a couple times. Has had menses since delivery, LMP 22  Bowel and bladder: no concerns  Skin: eczema on hands has returned since delivery that is itchy and painful with cracking skin. Does not desire any treatment or dermatology referral at this time.  Baby boy is in the NICU, reports doing well.     Pap 2019 NILM, HPV  "neg.  ================================================================  ROS: 10 point ROS neg other than the symptoms noted above in the HPI.     EXAM:  BP (!) 144/86   Pulse 60   Ht 1.549 m (5' 1\")   Wt 101.2 kg (223 lb)   LMP 04/29/2022 (Exact Date)   BMI 42.14 kg/m      General: healthy, alert and no distress  Psych: NEGATIVE  Last PHQ-9 score on record= 1  Cardiac: regular rate and rhythm, no rubs, clicks, or gallops  Respiratory: clear to auscultation, regular rate  Thyroid: Smooth to palpation, no lesions or enlargement  Breasts:  Lactating, Nipples intact with no lesions, Non-tender and No S/S of yeast or mastitis  Abdomen: Benign, Soft, flat, non-tender, No masses, organomegaly and Diastasis less than 1-2 FB  Incision:  well healed and dry and intact   Vulva: deferred  Vagina: deferred  Cervix: deferred    Uterus: deferred  Adnexa: deferred  Recto-vaginal: deferred    ASSESSMENT:   Encounter Diagnoses   Name Primary?     Routine postpartum follow-up Yes     OCP (oral contraceptive pills) initiation       Normal postpartum exam after c/s for maternal complications  Pregnancy was complicated by:  Anemia, Hemorrhage, IUGR and Preeclampsia.      PLAN:  Orders Placed This Encounter   Procedures     CBC with Platelets      Orders Placed This Encounter   Medications     norethindrone (MICRONOR) 0.35 MG tablet     Sig: Take 1 tablet (0.35 mg) by mouth daily     Dispense:  84 tablet     Refill:  3      - Risks and benefits of prescribed medications discussed. Medication instructions reviewed.  - Discussed progestin-only contraception methods, risks/benefits, and effectiveness.  Discouraged use of CHC methods d/t elevated blood pressure.  Pt would be open to try POPs.  Rx placed for Micronor.  Discussed back-up method x 1 week after initiation.  - Discussed calcium intake, vitamins and supplements including Vitamin D  - Exercise encouraged  - Encouraged Pao to recheck her blood pressure in 2-3 weeks and to " reach out if it is still elevated  - CBC with platelets ordered d/t postpartum anemia and thrombocytopenia  - Follow up in 1 year or as needed.    I, Pricilla Ponce, completed the PFSH and ROS. I then acted as a scribe for Almas QUAN CNM for the remainder of the visit. - Pricilla Ponce BSN, RN, PHN, SANE, [facility] DNP, WHNP student

## 2022-05-10 NOTE — NURSING NOTE
SUBJECTIVE:   Pao Pereira is here for her 6-week postpartum checkup.     PHQ-9 score:   Hx of Abuse:  No    Delivery Date: 3/27/22.    Delivering provider:  Sivan Moore MD.    Type of delivery:  .  Classical   Delivery complications: severe pre-eclampsia, Classical  at 30 weeks 2 days  Infant gender:  boy, weight 2 pounds 5 oz.  Feeding Method:  Bottlefed and breastfeeding.  Complications reported with feeding:  Still in NICU.    Bleeding:  Light.  Duration:  A couple weeks.  Menses resumed:  Yes   Bowel/Urinary problems:  No    Contraception Planned:  condoms  She  has not had intercourse since delivery.

## 2022-05-10 NOTE — PROGRESS NOTES
"Nursing Notes:   Danielle Johnson RN  5/10/2022 10:55 AM  Addendum  SUBJECTIVE:   Pao Pereira is here for her 6-week postpartum checkup.     PHQ-9 score:   Hx of Abuse:  No    Delivery Date: 3/27/22.    Delivering provider:  Sivan Moore MD.    Type of delivery:  .  Classical   Delivery complications: severe pre-eclampsia, Classical  at 30 weeks 2 days  Infant gender:  boy, weight 2 pounds 5 oz.  Feeding Method:  Bottlefed and breastfeeding.  Complications reported with feeding:  Still in NICU.    Bleeding:  Light.  Duration:  A couple weeks.  Menses resumed:  Yes   Bowel/Urinary problems:  No    Contraception Planned:  condoms  She  has not had intercourse since delivery.    ================================================================  Pt reports doing well in postpartum period, in good spirits.  Is 6 weeks postpartum from .  Pregnancy complicated by pre-eclampsia with SF, SIUGR, and  delivery.  General: reports fatigue  Mood: stable, no concerns  Breasts: pumping 5-6 times per day, started breastfeeding within the last couple days. Reports going well, was able to latch and feed for 7 minutes. Denies nipple cracking or soreness.  Abdomen: incision is healing well, no pain  Lochia: lasted about 7 days and then came back a couple times. Has had menses since delivery, LMP 22  Bowel and bladder: no concerns  Skin: eczema on hands has returned since delivery that is itchy and painful with cracking skin. Does not desire any treatment or dermatology referral at this time.  Baby boy is in the NICU, reports doing well.     Pap 2019 NILM, HPV neg.  ================================================================  ROS: 10 point ROS neg other than the symptoms noted above in the HPI.     EXAM:  BP (!) 144/86   Pulse 60   Ht 1.549 m (5' 1\")   Wt 101.2 kg (223 lb)   LMP 2022 (Exact Date)   BMI 42.14 kg/m      General: healthy, alert and no distress  Psych: " NEGATIVE  Last PHQ-9 score on record= 1  Cardiac: regular rate and rhythm, no rubs, clicks, or gallops  Respiratory: clear to auscultation, regular rate  Thyroid: Smooth to palpation, no lesions or enlargement  Breasts:  Lactating, Nipples intact with no lesions, Non-tender and No S/S of yeast or mastitis  Abdomen: Benign, Soft, flat, non-tender, No masses, organomegaly and Diastasis less than 1-2 FB  Incision:  well healed and dry and intact   Vulva: deferred  Vagina: deferred  Cervix: deferred    Uterus: deferred  Adnexa: deferred  Recto-vaginal: deferred    ASSESSMENT:   Encounter Diagnoses   Name Primary?     Routine postpartum follow-up Yes     OCP (oral contraceptive pills) initiation       Normal postpartum exam after c/s for maternal complications  Pregnancy was complicated by:  Anemia, Hemorrhage, IUGR and Preeclampsia.      PLAN:  Orders Placed This Encounter   Procedures     CBC with Platelets      Orders Placed This Encounter   Medications     norethindrone (MICRONOR) 0.35 MG tablet     Sig: Take 1 tablet (0.35 mg) by mouth daily     Dispense:  84 tablet     Refill:  3      - Risks and benefits of prescribed medications discussed. Medication instructions reviewed.  - Discussed progestin-only contraception methods, risks/benefits, and effectiveness.  Discouraged use of CHC methods d/t elevated blood pressure.  Pt would be open to try POPs.  Rx placed for Micronor.  Discussed back-up method x 1 week after initiation.  - Discussed calcium intake, vitamins and supplements including Vitamin D  - Exercise encouraged  - Encouraged Pao to recheck her blood pressure in 2-3 weeks and to reach out if it is still elevated  - CBC with platelets ordered d/t postpartum anemia and thrombocytopenia  - Follow up in 1 year or as needed.    I, Pricilla Ponce, completed the PFSH and ROS. I then acted as a scribe for Almas QUAN CNM for the remainder of the visit. - Pricilla Ponce BSN, RN, PHN, SANE, [facility]  [place] DNP, WHNP student

## 2022-05-11 ASSESSMENT — ANXIETY QUESTIONNAIRES: GAD7 TOTAL SCORE: 0

## 2022-05-12 DIAGNOSIS — L24.9 IRRITANT CONTACT DERMATITIS, UNSPECIFIED TRIGGER: Primary | ICD-10-CM

## 2022-05-12 RX ORDER — FLUOCINONIDE 0.5 MG/G
OINTMENT TOPICAL
Qty: 60 G | Refills: 3 | Status: SHIPPED | OUTPATIENT
Start: 2022-05-12

## 2022-05-22 ENCOUNTER — MYC REFILL (OUTPATIENT)
Dept: OBGYN | Facility: CLINIC | Age: 42
End: 2022-05-22
Payer: COMMERCIAL

## 2022-05-22 DIAGNOSIS — O92.79 LACTATION DISORDER, DELIVERED: ICD-10-CM

## 2022-05-23 RX ORDER — METOCLOPRAMIDE 10 MG/1
10 TABLET ORAL 3 TIMES DAILY
Qty: 30 TABLET | Refills: 0 | Status: SHIPPED | OUTPATIENT
Start: 2022-05-23 | End: 2022-06-06

## 2022-06-06 ENCOUNTER — MYC REFILL (OUTPATIENT)
Dept: OBGYN | Facility: CLINIC | Age: 42
End: 2022-06-06
Payer: COMMERCIAL

## 2022-06-06 DIAGNOSIS — O92.79 LACTATION DISORDER, DELIVERED: ICD-10-CM

## 2022-06-06 RX ORDER — METOCLOPRAMIDE 10 MG/1
10 TABLET ORAL 3 TIMES DAILY
Qty: 30 TABLET | Refills: 0 | Status: SHIPPED | OUTPATIENT
Start: 2022-06-06

## 2022-06-07 ENCOUNTER — LACTATION ENCOUNTER (OUTPATIENT)
Age: 42
End: 2022-06-07
Payer: COMMERCIAL

## 2022-06-07 NOTE — LACTATION NOTE
This note was copied from a baby's chart.  LACTATION DISCHARGE INSTRUCTIONS      Congratulations on your approaching discharge day!  Our goal is to help you have all the information, skills and equipment you need to help you meet your lactation goals at home.  The following handouts will give you information on:      CDC handout on recommendations for storing and preparing human milk at home    A feeding and diaper log, with how many times a day your baby should eat, as well as how many wet and soiled diapers per day    Transitioning to more latching at home    How to wean off the nipple shield    How to wean from the breast pump    Other discharge information  o Medications (including birth control)  o Growth spurts  o How to get a feeding test scale    Lactation support  o Outpatient (in-person and virtual) lactation resources  o Telephone and online support        CDC HANDOUT ON STORING AND PREPARING HUMAN MILK AT HOME      Please see attached handout     https://www.cdc.gov/breastfeeding/recommendations/handling_breastmilk.htm          FEEDING LOG: BABY'S FIRST WEEK, SECOND WEEK AND BEYOND      Please see attached feeding logs    Goal is to eat at least 8 times in 24 hours    Goal is to have at least 6 wet diapers in 24 hours    Talk to your provider about goal for soiled diapers.  Each baby is different depending on age and what they are eating        TRANSITIONING TO MORE FEEDINGS AT HOME    Often, babies go home from the NICU doing a combination of breastfeeding and bottle feeding.  With time and patience, most will go on to nurse most or all their feedings.  infants, in particular, may not be able to fully nurse until at or after their due date. To ensure your baby is taking adequate volumes, some babies may need supplemental bottle after breastfeeding. Keep these things in mind as you nurse your baby at home:      Good time management is key!  Make feedings efficient so you have time to eat, sleep,  "and pump.      It is important to latch your baby frequently, even if he or she is taking small amounts. Staying skin to skin will also help keep your baby \"breast oriented\".  Going days without latching will make it more difficult.  Babies can be re-taught how to latch, but this is very time consuming and not always successful.        Continue to use a slow flow nipple with bottle feeding with \"paced technique\". If your baby starts taking the bottle in a shorter amount of time (<15 minutes), use techniques to keep the feeding 15-20 minutes or more. These include having the baby sit upright, having the bottle horizontal, and taking the nipple out for breaks.      When your baby is older, it is important for them to still used \"paced technique\" with bottle feeding to avoid overeating and eating too fast.  Bottle feedings should take the same amount of time as a breastfeeding, with a similar amount of milk, to avoid your baby being frustrated at the breast.  Search on YouTube \"paced bottle feeding technique for the breastfeeding baby\".      Please see a lactation consultant ASAP if you are not meeting your latching goal.  It is easier to make changes now, versus weeks or months down the road.          HOW TO WEAN FROM THE NIPPLE SHIELD    Many NICU babies use a nipple shield for a period of time, especially premature babies and babies recovering from illness or surgery.  It helps them stay latched on and get milk more easily.    How do you know it's time to try off the nipple shield?      Your baby is waking on their own before feedings    Your baby is able to stay awake during the entire feeding, without a lot of encouragement to stay awake    Your baby's suck is significantly stronger     Your baby is taking full feedings at the breast    Typically, at or after their due date    How do I wean off the nipple shield?      Start the feeding with the nipple shield in place, then take the nipple shield off prison " "through the feeding and re-latch    Try at feedings where your baby is calm, not when they are frantically hungry    Middle of the night can be a good time to try, when everyone is relaxed    How do I know my baby is eating well without the nipple shield?      They seem satisfied after feeding    Your breasts feels softer after the feeding    Your baby has enough wet and soiled diapers    If using a breastfeeding scale-- the numbers on the scale are similar to a feeding with a nipple shield    If you have problems getting off the nipple shield, please make an appointment with a lactation consultant.                HOW TO WEAN FROM THE PUMP (AFTER YOUR BABY TAKES A FULL BREASTFEEDING)    Your milk supply may be greater than what your baby needs after discharge. It is important that you gradually wean from pumping after your baby takes a full breastfeeding (without needing a top-off).  If you wean too quickly, you will be uncomfortable and you run the risk of causing your supply to drop.    If you have been pumping less than two weeks:      If you are uncomfortable after a full breastfeeding, pump only until you are comfortable (versus pumping until empty)      If you have been pumping two weeks or more:      Continue to pump after every breastfeeding, but gradually decrease the amount of time you pump.   o Example: If you have been pumping 20 minutes after each full breastfeeding, decrease to 18 minutes for two days. If still comfortable, decrease to 16 minutes for another two days.     Continue this way until you no longer need to pump (after a fbreastfeeding).      Remember that if you are bottle feeding some feedings, you need to pump at the time you would have latched your baby. If you do not, you will start decreasing your milk supply.            OTHER DISCHARGE INFORMATION    Medications:     Per the \"Academy of Breastfeeding Medicine\", mothers of babies in the NICU are \"discouraged\" to use hormonal birth " "control \"as it may decrease milk supply especially in the early postpartum period\".      Some women also find decongestants and antihistamines may impact supply.      Always get a second opinion from a lactation consultant if told to stop latching or \"pump and dump\" when starting a new medication, having a procedure or you are ill; most of the time things are compatible.    Growth Spurts: Common times for \"growth spurts\" are around 7-10 days, 2-3 weeks, 4-6 weeks, 3 months, 4 months, 6 months and 9 months, but these vary widely between babies.  During these times allow your baby to nurse very frequently (or pump more frequently) to temporarily boost your supply, as opposed to supplementing.  It should pass in a few days when your supply increases, and your baby will settle into a new feeding pattern.    How to get a breastfeeding test weight scale:     Rental (2ml sensitivity):   o [facility]) [phone]   o [place] [employer]press ([facility]) [phone]  o [employer]) [phone]     Purchase scale (6ml sensitivity):   o \"Sanchez Baby Scale\" ([employer], Amazon, etc), around $150          LACTATION SUPPORT      OUTPATIENT AND VIRTUAL LACTATION SUPPORT    [facility] [id number]:   KADEEM Corbin, CNM, IBCLC  Tuesday:  [facility],  8:30 - 5, [phone]  Wednesday:  [facility], 7th floor, 8:30 - 4, [phone]  Thursday:  [facility], [facility], 8:30 - 4, [phone]    Breastfeeding Connection at [facility]  [phone]   Breastfeeding Connection at [facility]   [phone]  [facility]    [phone]  [facility]      [phone]  [facility]      [phone]  [facility]      [phone]    [facility]       [phone]    [facility] Lactation Support:    [facility] Outpatient Lactation " "Clinics  o [phone]  o [facility], [facility], [facility] Care Connection Triage Nurse  o [phone].     [facility]: home nurse visit for mother and baby  o [phone]                    Baby[employer]afes ([email or web address]):      Other Lactation Help:    [facility] [place]/ [place] (Tues/Wed)     o [phone]    [facility] Baby Weigh In (various times and locations)    o www.Dustcloud ++HAS VIRTUAL SUPPORT++     Enlightened Mama   o www.Plays.[employer]enedmamaSYN[employer]Y Corporation [phone]    [employer]         o [email or web address]-[email or web address]    [facility]     o [phone] ++HAS VIRTUAL SUPPORT++     Yoko Delgadillo  o [facility]    o [phone]    Rica Valdivia DO, MPH, ABO, IBCLC  o Integrative Family Medicine Physician/Breastfeeding Medicine  o [email or web address]  [phone]    [facility] \"Well Fed\" postpartum group ([facility])   o [phone]     Felicia Ware MD, IBCLC, Fellow of the Academy of Breastfeeding Medicine, [facility]   o [place] [phone]  o [place] [phone]    [facility])     o www.[email or web address].Labfolder/        Telephone and Online Support      [facility] ++HAS VIRTUAL SUPPORT++ (call for eligibility information)   [phone]      [employer]   ++HAS VIRTUAL SUPPORT++  www.llli.org  1-961-2-LA-LECHE ([phone])      [facility]-- up to date lactation information  o Www.[employer].Labfolder      International Breastfeeding Dexter ([employer])  o Http://ibconline.ca/      [facility] is available to answer questions about the use of medications during pregnancy and while breastfeeding  o [phone]  www.Storee.Labfolder       Office on Women's Health National Breastfeeding Help Line  o 8am to 5pm, English and Portuguese 1-891.369.9123 option 1    o https://www.womenshealth.gov/breastfeeding/ Ubkl8Ldkw Petra " (free on iPPipeliner CRM petra store or Google Play)      LactMed Petra (free on [place] [employer] petra store or Google Play) LactMed is available online at https://toxnet.nlm.nih.gov/newtoxnet/lactmed.htm and is now available on your mobile device. The free LactMed Petra for iPhone/iPWooMe Touch and Android can be downloaded at http://toxnet.nlm.nih.gov/help/lactmedapp.htm.    Jael Hernandez RNC, IBCLC/ Unique King RN, IBCLC/ Brittney French RNC, IBCLC

## 2022-07-22 LAB
PATH REPORT.COMMENTS IMP SPEC: NORMAL
PATH REPORT.COMMENTS IMP SPEC: NORMAL
PATH REPORT.FINAL DX SPEC: NORMAL
PATH REPORT.GROSS SPEC: NORMAL
PATH REPORT.MICROSCOPIC SPEC OTHER STN: NORMAL
PATH REPORT.RELEVANT HX SPEC: NORMAL
PHOTO IMAGE: NORMAL

## 2022-07-28 ENCOUNTER — MEDICAL CORRESPONDENCE (OUTPATIENT)
Dept: HEALTH INFORMATION MANAGEMENT | Facility: CLINIC | Age: 42
End: 2022-07-28

## 2022-09-18 ENCOUNTER — HEALTH MAINTENANCE LETTER (OUTPATIENT)
Age: 42
End: 2022-09-18

## 2022-10-24 ENCOUNTER — MEDICAL CORRESPONDENCE (OUTPATIENT)
Dept: HEALTH INFORMATION MANAGEMENT | Facility: CLINIC | Age: 42
End: 2022-10-24

## 2023-05-06 ENCOUNTER — HEALTH MAINTENANCE LETTER (OUTPATIENT)
Age: 43
End: 2023-05-06

## 2024-02-25 ENCOUNTER — HEALTH MAINTENANCE LETTER (OUTPATIENT)
Age: 44
End: 2024-02-25

## 2024-03-19 ENCOUNTER — LAB REQUISITION (OUTPATIENT)
Dept: LAB | Facility: CLINIC | Age: 44
End: 2024-03-19

## 2024-03-19 DIAGNOSIS — Z76.89 PERSONS ENCOUNTERING HEALTH SERVICES IN OTHER SPECIFIED CIRCUMSTANCES: ICD-10-CM

## 2024-03-19 PROCEDURE — 88305 TISSUE EXAM BY PATHOLOGIST: CPT | Performed by: PATHOLOGY

## 2024-07-14 ENCOUNTER — HEALTH MAINTENANCE LETTER (OUTPATIENT)
Age: 44
End: 2024-07-14

## (undated) DEVICE — SOL WATER IRRIG 1000ML BOTTLE 07139-09

## (undated) DEVICE — GOWN IMPERVIOUS SPECIALTY XLG/XLONG 32474

## (undated) DEVICE — SOL NACL 0.9% INJ 1000ML BAG 2B1324X

## (undated) DEVICE — SOL WATER IRRIG 1000ML BOTTLE 2F7114

## (undated) DEVICE — TUBING SUCTION 12"X1/4" N612

## (undated) DEVICE — DRSG ABDOMINAL 07 1/2X8" 7197D

## (undated) DEVICE — STOCKING SLEEVE COMPRESSION CALF LG

## (undated) DEVICE — GLOVE PROTEXIS W/NEU-THERA 7.5  2D73TE75

## (undated) DEVICE — STRAP KNEE/BODY 31143004

## (undated) DEVICE — SUCTION CANISTER MEDIVAC LINER 3000ML W/LID 65651-530

## (undated) DEVICE — CATH TRAY FOLEY SURESTEP 16FR WDRAIN BAG STLK LATEX A300316A

## (undated) DEVICE — PACK C-SECTION LF PL15OTA83B

## (undated) DEVICE — PANTIES MESH LG/XLG 2PK 706M2

## (undated) DEVICE — SU MONOCRYL 4-0 PS-2 18" UND Y496G

## (undated) DEVICE — Device

## (undated) DEVICE — TUBING HYDRO-SURG PLUS LAP IRRIGATOR SUCTION 0026870

## (undated) DEVICE — SU VICRYL 4-0 PS-2 18" UND J496H

## (undated) DEVICE — EVAC SYSTEM CLEAR FLOW SC082500

## (undated) DEVICE — LINEN TOWEL PACK X5 5464

## (undated) DEVICE — NDL 19GA 1.5"

## (undated) DEVICE — CATH TRAY FOLEY 16FR BARDEX W/DRAIN BAG STATLOCK 300316A

## (undated) DEVICE — TUBING IRRIG TUR Y TYPE 96" LF 6543-01

## (undated) DEVICE — SYR 05ML SLIP TIP W/O NDL 309647

## (undated) DEVICE — NDL INSUFFLATION 13GA 120MM C2201

## (undated) DEVICE — SYSTEM CLEARIFY VISUALIZATION 21-345

## (undated) DEVICE — SU VICRYL 4-0 PS-2 18" UND J496G

## (undated) DEVICE — SU MONOCRYL 0 CT-1 36" Y346H

## (undated) DEVICE — PREP SKIN SCRUB TRAY 4461A

## (undated) DEVICE — SU VICRYL 3-0 SH 27" J316H

## (undated) DEVICE — SOL RINGERS LACTATED 1000ML BAG 2B2324X

## (undated) DEVICE — GLOVE PROTEXIS W/NEU-THERA 8.0  2D73TE80

## (undated) DEVICE — GLOVE PROTEXIS MICRO 7.5  2D73PM75

## (undated) DEVICE — SU PLAIN 3-0 CTX 27" 873H

## (undated) DEVICE — DRSG STERI STRIP 1/2X4" R1547

## (undated) DEVICE — PREP CHLORAPREP 26ML TINTED ORANGE  260815

## (undated) DEVICE — SOL NACL 0.9% IRRIG 1000ML BOTTLE 07138-09

## (undated) DEVICE — SU VICRYL 0 CT-1 36" J346H

## (undated) DEVICE — GLOVE PROTEXIS BLUE W/NEU-THERA 7.0  2D73EB70

## (undated) DEVICE — GLOVE ESTEEM POWDER FREE SMT 6.5  2D72PT65

## (undated) RX ORDER — GLYCOPYRROLATE 0.2 MG/ML
INJECTION, SOLUTION INTRAMUSCULAR; INTRAVENOUS
Status: DISPENSED
Start: 2019-12-31

## (undated) RX ORDER — BUPIVACAINE HYDROCHLORIDE 2.5 MG/ML
INJECTION, SOLUTION EPIDURAL; INFILTRATION; INTRACAUDAL
Status: DISPENSED
Start: 2022-03-27

## (undated) RX ORDER — LIDOCAINE HYDROCHLORIDE 20 MG/ML
INJECTION, SOLUTION EPIDURAL; INFILTRATION; INTRACAUDAL; PERINEURAL
Status: DISPENSED
Start: 2019-12-31

## (undated) RX ORDER — DEXAMETHASONE SODIUM PHOSPHATE 4 MG/ML
INJECTION, SOLUTION INTRA-ARTICULAR; INTRALESIONAL; INTRAMUSCULAR; INTRAVENOUS; SOFT TISSUE
Status: DISPENSED
Start: 2019-12-31

## (undated) RX ORDER — OXYCODONE AND ACETAMINOPHEN 5; 325 MG/1; MG/1
TABLET ORAL
Status: DISPENSED
Start: 2019-12-31

## (undated) RX ORDER — PROPOFOL 10 MG/ML
INJECTION, EMULSION INTRAVENOUS
Status: DISPENSED
Start: 2019-12-31

## (undated) RX ORDER — NEOSTIGMINE METHYLSULFATE 1 MG/ML
VIAL (ML) INJECTION
Status: DISPENSED
Start: 2019-12-31

## (undated) RX ORDER — TRANEXAMIC ACID 10 MG/ML
INJECTION, SOLUTION INTRAVENOUS
Status: DISPENSED
Start: 2022-03-27

## (undated) RX ORDER — KETOROLAC TROMETHAMINE 30 MG/ML
INJECTION, SOLUTION INTRAMUSCULAR; INTRAVENOUS
Status: DISPENSED
Start: 2019-12-31

## (undated) RX ORDER — MORPHINE SULFATE 1 MG/ML
INJECTION, SOLUTION EPIDURAL; INTRATHECAL; INTRAVENOUS
Status: DISPENSED
Start: 2022-03-27

## (undated) RX ORDER — FENTANYL CITRATE 50 UG/ML
INJECTION, SOLUTION INTRAMUSCULAR; INTRAVENOUS
Status: DISPENSED
Start: 2022-03-27

## (undated) RX ORDER — FENTANYL CITRATE 50 UG/ML
INJECTION, SOLUTION INTRAMUSCULAR; INTRAVENOUS
Status: DISPENSED
Start: 2019-12-31

## (undated) RX ORDER — ONDANSETRON 2 MG/ML
INJECTION INTRAMUSCULAR; INTRAVENOUS
Status: DISPENSED
Start: 2019-12-31